# Patient Record
Sex: MALE | Race: WHITE | Employment: FULL TIME | URBAN - METROPOLITAN AREA
[De-identification: names, ages, dates, MRNs, and addresses within clinical notes are randomized per-mention and may not be internally consistent; named-entity substitution may affect disease eponyms.]

---

## 2017-06-16 ENCOUNTER — OFFICE VISIT (OUTPATIENT)
Dept: ORTHOPEDIC SURGERY | Age: 37
End: 2017-06-16

## 2017-06-16 VITALS
HEART RATE: 72 BPM | BODY MASS INDEX: 30.31 KG/M2 | DIASTOLIC BLOOD PRESSURE: 87 MMHG | WEIGHT: 199.96 LBS | HEIGHT: 68 IN | SYSTOLIC BLOOD PRESSURE: 124 MMHG

## 2017-06-16 DIAGNOSIS — S83.281A TEAR OF LATERAL CARTILAGE OR MENISCUS OF KNEE, CURRENT, RIGHT, INITIAL ENCOUNTER: ICD-10-CM

## 2017-06-16 DIAGNOSIS — S83.421A SPRAIN OF LATERAL COLLATERAL LIGAMENT OF RIGHT KNEE, INITIAL ENCOUNTER: ICD-10-CM

## 2017-06-16 DIAGNOSIS — M25.561 RIGHT KNEE PAIN, UNSPECIFIED CHRONICITY: Primary | ICD-10-CM

## 2017-06-16 PROCEDURE — L1812 KO ELASTIC W/JOINTS PRE OTS: HCPCS | Performed by: ORTHOPAEDIC SURGERY

## 2017-06-16 PROCEDURE — 73562 X-RAY EXAM OF KNEE 3: CPT | Performed by: ORTHOPAEDIC SURGERY

## 2017-06-16 PROCEDURE — 99214 OFFICE O/P EST MOD 30 MIN: CPT | Performed by: ORTHOPAEDIC SURGERY

## 2017-06-29 ENCOUNTER — TELEPHONE (OUTPATIENT)
Dept: ORTHOPEDIC SURGERY | Age: 37
End: 2017-06-29

## 2017-07-10 ENCOUNTER — OFFICE VISIT (OUTPATIENT)
Dept: ORTHOPEDIC SURGERY | Age: 37
End: 2017-07-10

## 2017-07-10 VITALS — HEIGHT: 68 IN | WEIGHT: 199.96 LBS | BODY MASS INDEX: 30.31 KG/M2

## 2017-07-10 DIAGNOSIS — S83.281D TEAR OF LATERAL CARTILAGE OR MENISCUS OF KNEE, CURRENT, RIGHT, SUBSEQUENT ENCOUNTER: Primary | ICD-10-CM

## 2017-07-10 DIAGNOSIS — S89.91XA RIGHT KNEE INJURY, INITIAL ENCOUNTER: ICD-10-CM

## 2017-07-10 PROCEDURE — 99213 OFFICE O/P EST LOW 20 MIN: CPT | Performed by: ORTHOPAEDIC SURGERY

## 2017-08-30 ENCOUNTER — OFFICE VISIT (OUTPATIENT)
Dept: ORTHOPEDIC SURGERY | Age: 37
End: 2017-08-30

## 2017-08-30 VITALS — WEIGHT: 199.96 LBS | BODY MASS INDEX: 30.31 KG/M2 | HEIGHT: 68 IN

## 2017-08-30 DIAGNOSIS — S83.281D TEAR OF LATERAL CARTILAGE OR MENISCUS OF KNEE, CURRENT, RIGHT, SUBSEQUENT ENCOUNTER: Primary | ICD-10-CM

## 2017-08-30 PROBLEM — M94.269 CHONDROMALACIA, KNEE: Status: ACTIVE | Noted: 2017-08-30

## 2017-08-30 PROCEDURE — 99213 OFFICE O/P EST LOW 20 MIN: CPT | Performed by: PHYSICIAN ASSISTANT

## 2017-09-11 DIAGNOSIS — S83.281A ACUTE LATERAL MENISCUS TEAR OF RIGHT KNEE, INITIAL ENCOUNTER: Primary | ICD-10-CM

## 2017-09-19 ENCOUNTER — HOSPITAL ENCOUNTER (OUTPATIENT)
Dept: SURGERY | Age: 37
Discharge: HOME | End: 2017-09-19
Attending: ORTHOPAEDIC SURGERY | Admitting: ORTHOPAEDIC SURGERY

## 2017-09-19 VITALS
SYSTOLIC BLOOD PRESSURE: 138 MMHG | HEART RATE: 71 BPM | TEMPERATURE: 97.7 F | OXYGEN SATURATION: 96 % | DIASTOLIC BLOOD PRESSURE: 50 MMHG | RESPIRATION RATE: 16 BRPM

## 2017-09-19 RX ORDER — IBUPROFEN 600 MG/1
600 TABLET ORAL EVERY 6 HOURS PRN
Qty: 40 TABLET | Refills: 1 | Status: SHIPPED | OUTPATIENT
Start: 2017-09-19 | End: 2019-08-01

## 2017-09-19 RX ORDER — SODIUM CHLORIDE, SODIUM LACTATE, POTASSIUM CHLORIDE, CALCIUM CHLORIDE 600; 310; 30; 20 MG/100ML; MG/100ML; MG/100ML; MG/100ML
INJECTION, SOLUTION INTRAVENOUS CONTINUOUS
Status: DISCONTINUED | OUTPATIENT
Start: 2017-09-19 | End: 2017-09-20 | Stop reason: HOSPADM

## 2017-09-19 RX ORDER — LABETALOL HYDROCHLORIDE 5 MG/ML
5 INJECTION, SOLUTION INTRAVENOUS EVERY 10 MIN PRN
Status: DISCONTINUED | OUTPATIENT
Start: 2017-09-19 | End: 2017-09-20 | Stop reason: HOSPADM

## 2017-09-19 RX ORDER — LIDOCAINE HYDROCHLORIDE 10 MG/ML
1 INJECTION, SOLUTION EPIDURAL; INFILTRATION; INTRACAUDAL; PERINEURAL
Status: ACTIVE | OUTPATIENT
Start: 2017-09-19 | End: 2017-09-19

## 2017-09-19 RX ORDER — PROMETHAZINE HYDROCHLORIDE 25 MG/ML
6.25 INJECTION, SOLUTION INTRAMUSCULAR; INTRAVENOUS
Status: DISCONTINUED | OUTPATIENT
Start: 2017-09-19 | End: 2017-09-20 | Stop reason: HOSPADM

## 2017-09-19 RX ORDER — ASPIRIN 325 MG
325 TABLET ORAL DAILY
Qty: 14 TABLET | Refills: 0 | Status: SHIPPED | OUTPATIENT
Start: 2017-09-19 | End: 2017-11-17 | Stop reason: ALTCHOICE

## 2017-09-19 RX ORDER — MEPERIDINE HYDROCHLORIDE 50 MG/ML
12.5 INJECTION INTRAMUSCULAR; INTRAVENOUS; SUBCUTANEOUS EVERY 5 MIN PRN
Status: DISCONTINUED | OUTPATIENT
Start: 2017-09-19 | End: 2017-09-20 | Stop reason: HOSPADM

## 2017-09-19 RX ORDER — MORPHINE SULFATE 2 MG/ML
2 INJECTION, SOLUTION INTRAMUSCULAR; INTRAVENOUS EVERY 5 MIN PRN
Status: DISCONTINUED | OUTPATIENT
Start: 2017-09-19 | End: 2017-09-20 | Stop reason: HOSPADM

## 2017-09-19 RX ORDER — HYDROCODONE BITARTRATE AND ACETAMINOPHEN 5; 325 MG/1; MG/1
1 TABLET ORAL EVERY 4 HOURS PRN
Qty: 40 TABLET | Refills: 0 | Status: SHIPPED | OUTPATIENT
Start: 2017-09-19 | End: 2017-10-06 | Stop reason: ALTCHOICE

## 2017-09-19 RX ORDER — OXYCODONE HYDROCHLORIDE AND ACETAMINOPHEN 5; 325 MG/1; MG/1
1 TABLET ORAL PRN
Status: ACTIVE | OUTPATIENT
Start: 2017-09-19 | End: 2017-09-19

## 2017-09-19 RX ORDER — MORPHINE SULFATE 2 MG/ML
1 INJECTION, SOLUTION INTRAMUSCULAR; INTRAVENOUS EVERY 5 MIN PRN
Status: DISCONTINUED | OUTPATIENT
Start: 2017-09-19 | End: 2017-09-20 | Stop reason: HOSPADM

## 2017-09-19 RX ORDER — HYDRALAZINE HYDROCHLORIDE 20 MG/ML
5 INJECTION INTRAMUSCULAR; INTRAVENOUS EVERY 10 MIN PRN
Status: DISCONTINUED | OUTPATIENT
Start: 2017-09-19 | End: 2017-09-20 | Stop reason: HOSPADM

## 2017-09-19 RX ORDER — DIPHENHYDRAMINE HYDROCHLORIDE 50 MG/ML
12.5 INJECTION INTRAMUSCULAR; INTRAVENOUS
Status: ACTIVE | OUTPATIENT
Start: 2017-09-19 | End: 2017-09-19

## 2017-09-19 RX ORDER — OXYCODONE HYDROCHLORIDE AND ACETAMINOPHEN 5; 325 MG/1; MG/1
2 TABLET ORAL PRN
Status: ACTIVE | OUTPATIENT
Start: 2017-09-19 | End: 2017-09-19

## 2017-09-19 RX ORDER — ONDANSETRON 2 MG/ML
4 INJECTION INTRAMUSCULAR; INTRAVENOUS PRN
Status: DISCONTINUED | OUTPATIENT
Start: 2017-09-19 | End: 2017-09-20 | Stop reason: HOSPADM

## 2017-09-19 RX ADMIN — SODIUM CHLORIDE, SODIUM LACTATE, POTASSIUM CHLORIDE, CALCIUM CHLORIDE: 600; 310; 30; 20 INJECTION, SOLUTION INTRAVENOUS at 11:53

## 2017-09-21 ENCOUNTER — HOSPITAL ENCOUNTER (OUTPATIENT)
Dept: PHYSICAL THERAPY | Age: 37
Discharge: HOME | End: 2017-09-30
Admitting: ORTHOPAEDIC SURGERY

## 2017-09-21 NOTE — FLOWSHEET NOTE
[facility]  Orthopaedics and Sports Rehabilitation, [facility]    Physical Therapy Daily Treatment Note  Date:  2017    Patient Name:  Tomas Shook    :  1980  MRN: [id number]  Restrictions/Precautions:    Medical/Treatment Diagnosis Information:  · Diagnosis: PT: R knee PLM, chondroplasty (17)  · Treatment Diagnosis: R knee pain K60.499E  Insurance/Certification information:  PT Insurance Information: [facility]  Physician Information:  Referring Practitioner: Mario Ramirez of care signed (Y/N):     Date of Patient follow up with Physician:     G-Code (if applicable):      Date G-Code Applied:    PT G-Codes  Functional Assessment Tool Used: LEFS  Score: 89% disability  Functional Limitation: Mobility: Walking and moving around  Mobility: Walking and Moving Around Current Status (): At least 80 percent but less than 100 percent impaired, limited or restricted  Mobility: Walking and Moving Around Goal Status ():  At least 1 percent but less than 20 percent impaired, limited or restricted    Progress Note: [x]  Yes  []  No  Next due by: Visit #10       Latex Allergy:  [x]NO      []YES  Preferred Language for Healthcare:   [x]English       []other:    Visit # Insurance Allowable   1 18 Expires 10/31/17     Pain level:  10     SUBJECTIVE:  See eval    OBJECTIVE: See eval  Observation:   Test measurements:      RESTRICTIONS/PRECAUTIONS: WBAT    Exercises/Interventions:     Therapeutic Ex Sets/sec Reps Notes HEP   Belt stretch  Seated HSS 30\"x3  30\"x3      Heel prop  Heel slides 1'  x10      QS  SLR 5\"x10  3x10      SSLR 3x10      Bike 5' ROM                                                                                   Manual Intervention                                                 NMR re-education                                                                          Therapeutic Exercise and NMR EXR  [] (04985) Provided verbal/tactile cueing for activities related to CP 10 minutes    Charges:  Timed Code Treatment Minutes: 30   Total Treatment Minutes: 50     [x] EVAL LOW 02542  [x] DW(50594) x  2   [] IONTO  [] NMR (59044) x      [] VASO  [] Manual (40550) x       [] Other:  [] TA x       [] Mech Traction (46487)  [] ES(attended) (49561)      [x] ES (un) (22173):     GOALS:   Patient stated goal: Return to work painfree    Therapist goals for Patient:   Short Term Goals: To be achieved in: 2 weeks  1. Independent in HEP and progression per patient tolerance, in order to prevent re-injury. 2. Patient will have a decrease in pain to facilitate improvement in movement, function, and ADLs as indicated by Functional Deficits. Long Term Goals: To be achieved in: 10 weeks  1. Disability index score of 10% or less for the LEFS to assist with reaching prior level of function. 2. Patient will demonstrate increased AROM to 0-130 to allow for proper joint functioning as indicated by patients Functional Deficits. 3. Patient will demonstrate an increase in Strength to good proximal hip strength and control, within 5lb HHD in LE to allow for proper functional mobility as indicated by patients Functional Deficits. 4. Patient will return to full functional activities without increased symptoms or restriction including ability to ascend/ descend stairs with reciprocal gait and ambulate I without gait abnormality. 5. Patient will be able to crawl > 100 feet and climb up/down ladders to return to work required duties without increased symptoms. Progression Towards Functional goals:  [] Patient is progressing as expected towards functional goals listed. [] Progression is slowed due to complexities listed. [] Progression has been slowed due to co-morbidities.   [x] Plan just implemented, too soon to assess goals progression  [] Other:     ASSESSMENT:  See eval    Treatment/Activity Tolerance:  [] Patient tolerated treatment well [] Patient limited by marvin  [] Patient limited by pain  [] Patient limited by other medical complications  [] Other:     Prognosis: [] Good [] Fair  [] Poor    Patient Requires Follow-up: [x] Yes  [] No    PLAN: See eval  [] Continue per plan of care [] Alter current plan (see comments)  [x] Plan of care initiated [] Hold pending MD visit [] Discharge    Electronically signed by: Tyler Flynn PT

## 2017-09-21 NOTE — PLAN OF CARE
[facility]  Orthopaedics and Sports Rehabilitation, [place] [address] [address] [facility], [address] [facility] [address]  Phone: [phone]   Fax:     [phone]       Physical Therapy Certification    Dear Referring Practitioner: Sunita Kelly,    We had the pleasure of evaluating the following patient for physical therapy services at [address] [place] [address]. A summary of our findings can be found in the initial assessment below. This includes our plan of care. If you have any questions or concerns regarding these findings, please do not hesitate to contact me at the office phone number checked above. Thank you for the referral.       Physician Signature:_______________________________Date:__________________  By signing above (or electronic signature), therapists plan is approved by physician      Patient: Zacarias Husain   : 1980   MRN: [id number]  Referring Physician: Referring Practitioner: Sunita Kelly      Evaluation Date: 2017      Medical Diagnosis Information:  Diagnosis: PT: R knee PLM, chondroplasty (17)   Treatment Diagnosis: R knee pain S83.281D                                         Insurance information: PT Insurance Information: [facility]     Precautions/ Contra-indications: none  Latex Allergy:  [x]NO      []YES  Preferred Language for Healthcare:   [x]English       []other:    SUBJECTIVE: Patient stated complaint:Patient injured R knee when he tripped down a step at work and fell to the ground 17. Patient reports immediate pain. PT: R knee PLM, chondroplasty (17)    Relevant Medical History:R knee PMM . L knee PMM   Functional Disability Index:PT G-Codes  Functional Assessment Tool Used: LEFS  Score: 89% disability  Functional Limitation: Mobility: Walking and moving around  Mobility: Walking and Moving Around Current Status ():  At least 80 percent but less than 100 percent impaired, limited or restricted  Mobility: Walking and Moving Around Goal Status (): At least 1 percent but less than 20 percent impaired, limited or restricted    Pain Scale: 4/10  Easing factors: rest, ice, NSAIDs  Provocative factors: prolonged sitting, prolonged standing, walking, stairs     Type: [x]Constant   []Intermittent  []Radiating [x]Localized []other:     Numbness/Tingling: none    Occupation/School: . Work included attic work, ladders, climbing poles, crawling. Off work at this time. Living Status/Prior Level of Function: Independent with ADLs and IADLs, no stairs at home. OBJECTIVE:     ROM LEFT RIGHT   HIP Flex     HIP Abd     HIP Ext     HIP IR     HIP ER     Knee ext     Knee Flex 132 68   Ankle PF 0 Lacking 10   Ankle DF     Ankle In     Ankle Ev     Strength  LEFT RIGHT   HIP Flexors 5 NT   HIP Abductors 5    HIP Ext     Hip ER     Knee EXT (quad) 5    Knee Flex (HS) 5    Ankle DF     Ankle PF     Ankle Inv     Ankle EV          Circumference  Mid apex  7 cm prox             Reflexes/Sensation:    [x]Dermatomes/Myotomes intact    [x]Reflexes equal and normal bilaterally   []Other:    Joint mobility:    [x]Normal    []Hypo   []Hyper    Palpation: tender patellar tendon, lateral joint line    Functional Mobility/Transfers: pain with sit to stand, I with bed mobility    Posture: na    Bandages/Dressings/Incisions: dressing change performed by PT. Incision open without drainage. 3 by 3 cm of psoriatic arthritis per patient on R knee present prior to surgery. Bursa sac with mild swelling joint line    Gait: (include devices/WB status) ambulating with decreased TKE on RLE with single axillary crutch    Orthopedic Special Tests: na                       [x] Patient history, allergies, meds reviewed. Medical chart reviewed. See intake form. Review Of Systems (ROS):  [x]Performed Review of systems (Integumentary, CardioPulmonary, Neurological) by intake and observation.  Intake form has been scanned into medical record. Patient has been instructed to contact their primary care physician regarding ROS issues if not already being addressed at this time. Co-morbidities/Complexities (which will affect course of rehabilitation):   [x]None           Arthritic conditions   []Rheumatoid arthritis (M05.9)  []Osteoarthritis (M19.91)   Cardiovascular conditions   []Hypertension (I10)  []Hyperlipidemia (E78.5)  []Angina pectoris (I20)  []Atherosclerosis (I70)   Musculoskeletal conditions   []Disc pathology   []Congenital spine pathologies   []Prior surgical intervention  []Osteoporosis (M81.8)  []Osteopenia (M85.8)   Endocrine conditions   []Hypothyroid (E03.9)  []Hyperthyroid Gastrointestinal conditions   []Constipation (O78.69)   Metabolic conditions   []Morbid obesity (E66.01)  []Diabetes type 1(E10.65) or 2 (E11.65)   []Neuropathy (G60.9)     Pulmonary conditions   []Asthma (J45)  []Coughing   []COPD (J44.9)   Psychological Disorders  []Anxiety (F41.9)  []Depression (F32.9)   []Other:   []Other:          Barriers to/and or personal factors that will affect rehab potential:              []Age  []Sex              []Motivation/Lack of Motivation                        [x]Co-Morbidities              []Cognitive Function, education/learning barriers              []Environmental, home barriers              [x]profession/work barriers  []past PT/medical experience  []other:  Justification: high level of activity required of work    Falls Risk Assessment (30 days):   [x] Falls Risk assessed and no intervention required. [] Falls Risk assessed and Patient requires intervention due to being higher risk   TUG score (>12s at risk):     [] Falls education provided, including       G-Codes:  PT G-Codes  Functional Assessment Tool Used: LEFS  Score: 89% disability  Functional Limitation: Mobility: Walking and moving around  Mobility: Walking and Moving Around Current Status ():  At least 80 percent but less than 100 []Signs/symptoms consistent with tendinitis/tendinosis    []signs/symptoms consistent with pathology which may benefit from Dry needling      []other:      Prognosis/Rehab Potential:      []Excellent   [x]Good    []Fair   []Poor    Tolerance of evaluation/treatment:    []Excellent   [x]Good    []Fair   []Poor    Physical Therapy Evaluation Complexity Justification  [x] A history of present problem with:  [] no personal factors and/or comorbidities that impact the plan of care;  [x]1-2 personal factors and/or comorbidities that impact the plan of care  []3 personal factors and/or comorbidities that impact the plan of care  [x] An examination of body systems using standardized tests and measures addressing any of the following: body structures and functions (impairments), activity limitations, and/or participation restrictions;:  [] a total of 1-2 or more elements   [] a total of 3 or more elements   [x] a total of 4 or more elements   [x] A clinical presentation with:  [x] stable and/or uncomplicated characteristics   [] evolving clinical presentation with changing characteristics  [] unstable and unpredictable characteristics;   [x] Clinical decision making of [x] low, [] moderate, [] high complexity using standardized patient assessment instrument and/or measurable assessment of functional outcome. [x] EVAL (LOW) 23603 (typically 20 minutes face-to-face)  [] EVAL (MOD) 37463 (typically 30 minutes face-to-face)  [] EVAL (HIGH) 24995 (typically 45 minutes face-to-face)  [] RE-EVAL     PLAN:   Frequency/Duration:  2 days per week for 10 Weeks:  Interventions:  [x]  Therapeutic exercise including: strength training, ROM, for Lower extremity and core   [x]  NMR activation and proprioception for LE, Glutes and Core   [x]  Manual therapy as indicated for LE, Hip and spine to include: Dry Needling/IASTM, STM, PROM, Gr I-IV mobilizations, manipulation.    [x] Modalities as needed that may include: thermal agents, E-stim, Biofeedback, US, iontophoresis as indicated  [x] Patient education on joint protection, postural re-education, activity modification, progression of HEP. HEP instruction: (see scanned forms)    GOALS:  Patient stated goal: Return to work painfree    Therapist goals for Patient:   Short Term Goals: To be achieved in: 2 weeks  1. Independent in HEP and progression per patient tolerance, in order to prevent re-injury. 2. Patient will have a decrease in pain to facilitate improvement in movement, function, and ADLs as indicated by Functional Deficits. Long Term Goals: To be achieved in: 10 weeks  1. Disability index score of 10% or less for the LEFS to assist with reaching prior level of function. 2. Patient will demonstrate increased AROM to 0-130 to allow for proper joint functioning as indicated by patients Functional Deficits. 3. Patient will demonstrate an increase in Strength to good proximal hip strength and control, within 5lb HHD in LE to allow for proper functional mobility as indicated by patients Functional Deficits. 4. Patient will return to full functional activities without increased symptoms or restriction including ability to ascend/ descend stairs with reciprocal gait and ambulate I without gait abnormality. 5. Patient will be able to crawl > 100 feet and climb up/down ladders to return to work required duties without increased symptoms.      Electronically signed by:  Sis Andrews PT

## 2017-09-22 ENCOUNTER — OFFICE VISIT (OUTPATIENT)
Dept: ORTHOPEDIC SURGERY | Age: 37
End: 2017-09-22

## 2017-09-22 VITALS — HEIGHT: 68 IN | WEIGHT: 199.08 LBS | BODY MASS INDEX: 30.17 KG/M2

## 2017-09-22 DIAGNOSIS — M94.261 CHONDROMALACIA, KNEE, RIGHT: ICD-10-CM

## 2017-09-22 DIAGNOSIS — Z98.890 S/P ARTHROSCOPY OF KNEE: Primary | ICD-10-CM

## 2017-09-22 PROCEDURE — 99024 POSTOP FOLLOW-UP VISIT: CPT | Performed by: PHYSICIAN ASSISTANT

## 2017-09-25 ENCOUNTER — HOSPITAL ENCOUNTER (OUTPATIENT)
Dept: PHYSICAL THERAPY | Age: 37
Discharge: HOME | End: 2017-09-25
Admitting: ORTHOPAEDIC SURGERY

## 2017-09-27 ENCOUNTER — HOSPITAL ENCOUNTER (OUTPATIENT)
Dept: PHYSICAL THERAPY | Age: 37
Discharge: HOME | End: 2017-09-27
Admitting: ORTHOPAEDIC SURGERY

## 2017-10-01 ENCOUNTER — HOSPITAL ENCOUNTER (OUTPATIENT)
Dept: PHYSICAL THERAPY | Age: 37
Discharge: HOME | End: 2017-10-31
Attending: ORTHOPAEDIC SURGERY | Admitting: ORTHOPAEDIC SURGERY

## 2017-10-03 ENCOUNTER — HOSPITAL ENCOUNTER (OUTPATIENT)
Dept: PHYSICAL THERAPY | Age: 37
Discharge: HOME | End: 2017-10-03
Admitting: ORTHOPAEDIC SURGERY

## 2017-10-03 NOTE — FLOWSHEET NOTE
Physical Therapist Assistant Activity Sheet    Date:  10/3/2017    Patient Name:  Andie Steen    :  1980  MRN: [id number]  Restrictions/Precautions:    Medical/Treatment Diagnosis Information:  ·  R knee PLM      Physician Information:   Cesar Martinez Post-op  8 wks  12 wks 16 wks 20 wks   24 wks                            Activities                                                DOS/DOI:                                                    Date: 17    Bike    Elliptical    Treadmill    Airdyne        Gastroc stretch    Soleus stretch    Hamstring stretch    ITB stretch    Hip Flexor stretch    Quad stretch    Adductor stretch        Weight Shifting sp                              fp                              tp    Lateral walking (with/w/o TB)        Balance: PEP/Danielle board                   SLS  2# tramp toss foam x 30         Star excursion load/explode          Extremity reach UE/LE        Leg Press Patel. 100# x 30                     Ecc.                      Inv. 60# x30   Calf Press Patel. Ecc.                      Inv.        NINI   Flex               ABd R/L 50# x 30 ea              ADd               TKE 65# x 30 c 3\" hold              Ext R/L 50# x 30 ea       Steps  Up                Up and Over                Down                Lateral 4\"x15               Rotation        Squats:  Mini                   Wall 4# x30                  BOSU        Lunges:  Lunge to Balance                   Balance to Lunge                   Walking        Knee Extension Bilat. Ecc.                               Inv. Hamstring Curls Bilat. 50# x 30                               Ecc.                                Inv.        Soleus Press Bilat. Ecc.                            Inv.         Ladders    Square    Jump/Hop  Low                       Med.                       High    SLR + x3       Modality    PTA Assessment

## 2017-10-05 ENCOUNTER — HOSPITAL ENCOUNTER (OUTPATIENT)
Dept: PHYSICAL THERAPY | Age: 37
Discharge: HOME | End: 2017-10-05
Admitting: ORTHOPAEDIC SURGERY

## 2017-10-05 NOTE — FLOWSHEET NOTE
proximal hip, and core control in self care, mobility, lifting, ambulation and eccentric single leg control. NMR and Therapeutic Activities:    [] (85506 or 50074) Provided verbal/tactile cueing for activities related to improving balance, coordination, kinesthetic sense, posture, motor skill, proprioception and motor activation to allow for proper function of core, proximal hip and LE with self care and ADLs  [] (89501) Gait Re-education- Provided training and instruction to the patient for proper LE, core and proximal hip recruitment and positioning and eccentric body weight control with ambulation re-education including up and down stairs     Home Exercise Program:    [x] (54634) Reviewed/Progressed HEP activities related to strengthening, flexibility, endurance, ROM of core, proximal hip and LE for functional self-care, mobility, lifting and ambulation/stair navigation   [] (44316)Reviewed/Progressed HEP activities related to improving balance, coordination, kinesthetic sense, posture, motor skill, proprioception of core, proximal hip and LE for self care, mobility, lifting, and ambulation/stair navigation      Manual Treatments:  PROM / STM / Oscillations-Mobs:  G-I, II, III, IV (PA's, Inf., Post.)  [] (53020) Provided manual therapy to mobilize LE, proximal hip and/or LS spine soft tissue/joints for the purpose of modulating pain, promoting relaxation,  increasing ROM, reducing/eliminating soft tissue swelling/inflammation/restriction, improving soft tissue extensibility and allowing for proper ROM for normal function with self care, mobility, lifting and ambulation.      Modalities:   declined  Charges:  Timed Code Treatment Minutes: 40   Total Treatment Minutes: 40     [] EVAL LOW 48241  [x] HL(71220) x  2   [] IONTO  [x] NMR (39299) x      [] VASO  [] Manual (41292) x       [] Other:  [] TA x       [] Mech Traction (24140)  [] ES(attended) (81254)      [] ES (un) (03079):     GOALS:   Patient stated of care initiated [] Hold pending MD visit [] Discharge    Electronically signed by: Aurea Gallo PT

## 2017-10-05 NOTE — FLOWSHEET NOTE
Physical Therapist Assistant Activity Sheet    Date:  10/5/2017    Patient Name:  Brooke Fay    :  1980  MRN: [id number]  Restrictions/Precautions:    Medical/Treatment Diagnosis Information:  ·   Diagnosis: PT: R knee PLM, chondroplasty (17)     Treatment Diagnosis: R knee pain S83.281D  Physician Information:   Maria C Roblero                            Activities                                                DOS/DOI:                                                    Date: 9/27/17  10/5/17   Bike  6 min   Elliptical     Treadmill     Airdyne          Gastroc stretch / Heel Raises  30\" x 3 / 3 x 10   Soleus stretch     Hamstring stretch     ITB stretch     Hip Flexor stretch     Quad stretch     Adductor stretch          Weight Shifting sp                               fp                               tp     Lateral walking (with/w/o TB)          Balance: PEP/Danielle board                    SLS  2# tramp toss foam x 30 4# tramp toss foam x 30         Star excursion load/explode  X 7         Extremity reach UE/LE          Leg Press Patel. 100# x 30 100# x 30                     Ecc.                       Inv. 60# x30 60# x 30   Calf Press Patel. Ecc.                       Inv.          NINI   Flex                ABd R/L 50# x 30 ea 50# x 30 R/L              ADd                TKE 65# x 30 c 3\" hold               Ext R/L 50# x 30 ea 50# x 30 R/L        Steps  Up                Up and Over                 Down                 Lateral 4\"x15 Attempted, pain               Rotation          Squats:  Mini                    Wall 4# x30 X 30                  BOSU          Lunges:  Lunge to Balance                    Balance to Lunge                    Walking          Knee Extension Bilat. Ecc.                                Inv. Hamstring Curls Bilat.  50# x 30 50# x 30                               Ecc.                                 Inv.          Soleus Press Bilat. Ecc.                             Inv. Ladders     Square     Jump/Hop  Low                        Med.                        High     SLR + x3         Modality  Premod + CP 10'   PTA Assessment Discomfort with LSDs, stopped at 15 reps and attempted wall squats without pain. Pain with LSD. Challenged with star excursion.  Pt reports swelling in knee is limiting function   Time Based Treatment 34 minutes 30 minutes     Electronically signed by: Peng Greene PTA

## 2017-10-06 ENCOUNTER — OFFICE VISIT (OUTPATIENT)
Dept: ORTHOPEDIC SURGERY | Age: 37
End: 2017-10-06

## 2017-10-06 VITALS
WEIGHT: 199.08 LBS | HEIGHT: 68 IN | BODY MASS INDEX: 30.17 KG/M2 | HEART RATE: 85 BPM | DIASTOLIC BLOOD PRESSURE: 90 MMHG | SYSTOLIC BLOOD PRESSURE: 123 MMHG

## 2017-10-06 DIAGNOSIS — M94.261 CHONDROMALACIA, KNEE, RIGHT: ICD-10-CM

## 2017-10-06 DIAGNOSIS — Z98.890 S/P ARTHROSCOPY OF KNEE: Primary | ICD-10-CM

## 2017-10-06 DIAGNOSIS — S83.281A ACUTE LATERAL MENISCUS TEAR OF RIGHT KNEE, INITIAL ENCOUNTER: ICD-10-CM

## 2017-10-06 PROCEDURE — 99024 POSTOP FOLLOW-UP VISIT: CPT | Performed by: ORTHOPAEDIC SURGERY

## 2017-10-06 NOTE — PROGRESS NOTES
This patient presents status post is 9/19/17 right knee arthroscopy for a 20% lateral meniscus tear and limited grade 3 chondromalacia of the of the patella and medial femoral condyle. POSTOPERATIVE DIAGNOSES:  1. Tear of posterior horn mid body and posterior horn lateral  meniscus involving 20% meniscus. 2.  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 3.  Synovitis. 4.  Mild grade 3 chondromalacia of the medial femoral condyle  approximately 1.5 x 1.5 cm region. History of present illness: The patient returns today for a followup after knee arthroscopy. Pain control has been satisfactory with oral medications. They have completed formal physical therapy. He reports he steadily improving I really having much pain anymore. He still notes he has continued swelling along the front of his knee. Physical examination: Incisions are well healed with no signs of infection. The patient demonstrates full active range of motion. Quad tone is adequate. Normal gait without the use of ambulatory devices. He still has a fairly tense but relatively small prepatellar bursa measuring about 3 cm in diameter. Signs of infection or DVT. There is no tenderness over the lateral joint space or medial joint space. The calf is soft and nontender as well. Assessment/plan: The patient is doing reasonably well after knee arthroscopy. They have no complaints. I recommend continued therapy. He is going to be released to work light duty with no climbing ladders, no heavy lifting and no crawling. We will reevaluate him in 3 weeks. 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 We are also getting him a simple compression sleeve to help try to get rid of his prepatellar bursa. I have personally performed and/or participated in the history, exam and medical decision making and agree with all pertinent clinical information.  I have also reviewed and agree with the past medical, family and social history unless otherwise noted. This dictation was performed with a verbal recognition program (DRAGON) and it was checked for errors. It is possible that there are still dictated errors within this office note. If so, please bring any errors to my attention for an addendum. All efforts were made to ensure that this office note is accurate.           Ham Patten MD

## 2017-10-09 ENCOUNTER — HOSPITAL ENCOUNTER (OUTPATIENT)
Dept: PHYSICAL THERAPY | Age: 37
Discharge: HOME | End: 2017-10-09
Admitting: ORTHOPAEDIC SURGERY

## 2017-10-09 NOTE — FLOWSHEET NOTE
Formerly [facility]  Orthopaedics and Sports Rehabilitation, [facility]    Physical Therapy Daily Treatment Note  Date:  10/9/2017    Patient Name:  Tomas Shook    :  1980  MRN: [id number]  Restrictions/Precautions:    Medical/Treatment Diagnosis Information:  · Diagnosis: PT: R knee PLM, chondroplasty (17)  · Treatment Diagnosis: R knee pain N56.342S  Insurance/Certification information:  PT Insurance Information: [facility]  Physician Information:  Referring Practitioner: Mario Ramirez of care signed (Y/N):     Date of Patient follow up with Physician:     G-Code (if applicable):      Date G-Code Applied:         Progress Note: [x]  Yes  []  No  Next due by: Visit #10       Latex Allergy:  [x]NO      []YES  Preferred Language for Healthcare:   [x]English       []other:    Visit # Insurance Allowable   7 18 Expires 10/31/17     Pain level:  4/10     SUBJECTIVE: Saw MD who was pleased with progress, gave him a compression sleeve to help with the swelling. \"He is going to be released to work light duty with no climbing ladders, no heavy lifting and no crawling for 3 weeks. Hoping to return to full duty then. \"    OBJECTIVE:   Observation:   Test measurements:   L knee ROM: 0-132 degrees    RESTRICTIONS/PRECAUTIONS: WBAT    Exercises/Interventions:     Therapeutic Ex Sets/sec Notes HEP   Belt stretch  Seated HSS   30\"x3     Heel prop  Heel slides 1'  x10     QS  SLR 5\"x10  3x10     SSLR  Prone SLR 3x10  3x10     Bike 6'      Slantboard  HRs 30\"x3  3x10     Leg Press- DL  NINI- ABD  NINI- TKE     HS curls- DL                 See other flow sheet                                                                                                                                              Therapeutic Exercise and NMR EXR  [x] (66516) Provided verbal/tactile cueing for activities related to strengthening, flexibility, endurance, ROM for improvements in LE, proximal hip, and core control with self care, mobility, lifting, ambulation.  [] (60472) Provided verbal/tactile cueing for activities related to improving balance, coordination, kinesthetic sense, posture, motor skill, proprioception  to assist with LE, proximal hip, and core control in self care, mobility, lifting, ambulation and eccentric single leg control. NMR and Therapeutic Activities:    [] (12347 or 01208) Provided verbal/tactile cueing for activities related to improving balance, coordination, kinesthetic sense, posture, motor skill, proprioception and motor activation to allow for proper function of core, proximal hip and LE with self care and ADLs  [] (37310) Gait Re-education- Provided training and instruction to the patient for proper LE, core and proximal hip recruitment and positioning and eccentric body weight control with ambulation re-education including up and down stairs     Home Exercise Program:    [x] (73242) Reviewed/Progressed HEP activities related to strengthening, flexibility, endurance, ROM of core, proximal hip and LE for functional self-care, mobility, lifting and ambulation/stair navigation   [] (31091)Reviewed/Progressed HEP activities related to improving balance, coordination, kinesthetic sense, posture, motor skill, proprioception of core, proximal hip and LE for self care, mobility, lifting, and ambulation/stair navigation      Manual Treatments:  PROM / STM / Oscillations-Mobs:  G-I, II, III, IV (PA's, Inf., Post.)  [] (98067) Provided manual therapy to mobilize LE, proximal hip and/or LS spine soft tissue/joints for the purpose of modulating pain, promoting relaxation,  increasing ROM, reducing/eliminating soft tissue swelling/inflammation/restriction, improving soft tissue extensibility and allowing for proper ROM for normal function with self care, mobility, lifting and ambulation.      Modalities:   declined  Charges:  Timed Code Treatment Minutes: 40   Total Treatment Minutes: 40     [] EVAL LOW 05041  [x] AD(87352) x  2

## 2017-10-09 NOTE — FLOWSHEET NOTE
Ecc.                                 Inv. Hamstring Curls Bilat. 50# x 30 50# x 30 50# x 30                               Ecc.                                  Inv.            Soleus Press Bilat. Ecc.                              Inv. Ladders      Square      Jump/Hop  Low                         Med.                         High      SLR + x3           Modality  Premod + CP 10'    PTA Assessment Discomfort with LSDs, stopped at 15 reps and attempted wall squats without pain. Pain with LSD. Challenged with star excursion. Pt reports swelling in knee is limiting function Good exercise pace and technique.     34 minutes 30 minutes 30 minutes     Electronically signed by: Sarah Lawson PTA

## 2017-10-12 ENCOUNTER — HOSPITAL ENCOUNTER (OUTPATIENT)
Dept: PHYSICAL THERAPY | Age: 37
Discharge: HOME | End: 2017-10-12
Admitting: ORTHOPAEDIC SURGERY

## 2017-10-12 NOTE — FLOWSHEET NOTE
[facility]  Orthopaedics and Sports Rehabilitation, [facility]    Physical Therapy Daily Treatment Note  Date:  10/12/2017    Patient Name:  Yahir Pickens    :  1980  MRN: [id number]  Restrictions/Precautions:    Medical/Treatment Diagnosis Information:  · Diagnosis: PT: R knee PLM, chondroplasty (17)  · Treatment Diagnosis: R knee pain Y13.794A  Insurance/Certification information:  PT Insurance Information: [facility]  Physician Information:  Referring Practitioner: Delmar Caal of care signed (Y/N):     Date of Patient follow up with Physician:     G-Code (if applicable):      Date G-Code Applied:         Progress Note: [x]  Yes  []  No  Next due by: Visit #10       Latex Allergy:  [x]NO      []YES  Preferred Language for Healthcare:   [x]English       []other:    Visit # Insurance Allowable   8 18 Expires 10/31/17     Pain level:  4/10      SUBJECTIVE: Frustrated that he can not return to work per his company with any restrictions. Plans to call MD today to hope to be released full duty. \"He is going to be released to work light duty with no climbing ladders, no heavy lifting and no crawling for 3 weeks. Hoping to return to full duty then. \"    OBJECTIVE:    Observation:   Test measurements:   L knee ROM: 0-132 degrees    RESTRICTIONS/PRECAUTIONS: WBAT    Exercises/Interventions:     Therapeutic Ex Sets/sec Notes HEP   Belt stretch  Seated HSS   30\"x3     Heel prop  Heel slides 1'  x10             Bike 6'      Slantboard  HRs 30\"x3  3x10     Leg Press- DL  Leg Press- Ecc  Leg Press- SL  NINI- ABD  NINI- TKE 90# x30  70# x30  65# x30  80# x30     HS curls- DL 50# x30     SLS tramp toss 4# x30 foam     Wall squats  Star excursion  Monster Walks 4# MB 30\"x3  x10  x3 laps Red WP     SLR + x5                                                                                                                                                   Therapeutic Exercise and NMR EXR  [x] (82680) Provided verbal/tactile cueing for activities related to strengthening, flexibility, endurance, ROM for improvements in LE, proximal hip, and core control with self care, mobility, lifting, ambulation.  [] (97958) Provided verbal/tactile cueing for activities related to improving balance, coordination, kinesthetic sense, posture, motor skill, proprioception  to assist with LE, proximal hip, and core control in self care, mobility, lifting, ambulation and eccentric single leg control.      NMR and Therapeutic Activities:    [] (84827 or 31417) Provided verbal/tactile cueing for activities related to improving balance, coordination, kinesthetic sense, posture, motor skill, proprioception and motor activation to allow for proper function of core, proximal hip and LE with self care and ADLs  [] (64376) Gait Re-education- Provided training and instruction to the patient for proper LE, core and proximal hip recruitment and positioning and eccentric body weight control with ambulation re-education including up and down stairs     Home Exercise Program:    [x] (85750) Reviewed/Progressed HEP activities related to strengthening, flexibility, endurance, ROM of core, proximal hip and LE for functional self-care, mobility, lifting and ambulation/stair navigation   [] (81638)Reviewed/Progressed HEP activities related to improving balance, coordination, kinesthetic sense, posture, motor skill, proprioception of core, proximal hip and LE for self care, mobility, lifting, and ambulation/stair navigation      Manual Treatments:  PROM / STM / Oscillations-Mobs:  G-I, II, III, IV (PA's, Inf., Post.)  [] (43902) Provided manual therapy to mobilize LE, proximal hip and/or LS spine soft tissue/joints for the purpose of modulating pain, promoting relaxation,  increasing ROM, reducing/eliminating soft tissue swelling/inflammation/restriction, improving soft tissue extensibility and allowing for proper ROM for normal function with self care, mobility, lifting and ambulation. Modalities:   HV/ CP 10 minutes   Charges:  Timed Code Treatment Minutes: 40   Total Treatment Minutes: 50     [] EVAL LOW 85978  [x] NT(27412) x  2   [] IONTO  [x] NMR (57836) x      [] VASO  [] Manual (97562) x       [] Other:  [] TA x       [] Mech Traction (95971)  [] ES(attended) (62510)      [x] ES (un) (14632):     GOALS:   Patient stated goal: Return to work painfree    Therapist goals for Patient:   Short Term Goals: To be achieved in: 2 weeks  1. Independent in HEP and progression per patient tolerance, in order to prevent re-injury. 2. Patient will have a decrease in pain to facilitate improvement in movement, function, and ADLs as indicated by Functional Deficits. Long Term Goals: To be achieved in: 10 weeks  1. Disability index score of 10% or less for the LEFS to assist with reaching prior level of function. 2. Patient will demonstrate increased AROM to 0-130 to allow for proper joint functioning as indicated by patients Functional Deficits. 3. Patient will demonstrate an increase in Strength to good proximal hip strength and control, within 5lb HHD in LE to allow for proper functional mobility as indicated by patients Functional Deficits. 4. Patient will return to full functional activities without increased symptoms or restriction including ability to ascend/ descend stairs with reciprocal gait and ambulate I without gait abnormality. 5. Patient will be able to crawl > 100 feet and climb up/down ladders to return to work required duties without increased symptoms. Progression Towards Functional goals:  [] Patient is progressing as expected towards functional goals listed. [] Progression is slowed due to complexities listed. [] Progression has been slowed due to co-morbidities. [x] Plan just implemented, too soon to assess goals progression  [] Other:     ASSESSMENT:  Tolerated treatment well. Progressing without complaints.  Hoping to return

## 2017-10-16 ENCOUNTER — OFFICE VISIT (OUTPATIENT)
Dept: ORTHOPEDIC SURGERY | Age: 37
End: 2017-10-16

## 2017-10-16 VITALS
HEART RATE: 80 BPM | WEIGHT: 199.08 LBS | HEIGHT: 67 IN | DIASTOLIC BLOOD PRESSURE: 90 MMHG | BODY MASS INDEX: 31.25 KG/M2 | SYSTOLIC BLOOD PRESSURE: 141 MMHG

## 2017-10-16 DIAGNOSIS — Z98.890 S/P ARTHROSCOPY OF KNEE: Primary | ICD-10-CM

## 2017-10-16 DIAGNOSIS — M25.561 RIGHT KNEE PAIN, UNSPECIFIED CHRONICITY: ICD-10-CM

## 2017-10-16 PROCEDURE — 99024 POSTOP FOLLOW-UP VISIT: CPT | Performed by: PHYSICIAN ASSISTANT

## 2017-10-16 NOTE — PROGRESS NOTES
This patient presents status post is 9/19/17 right knee arthroscopy for a 20% lateral meniscus tear and limited grade 3 chondromalacia of the of the patella and medial femoral condyle. POSTOPERATIVE DIAGNOSES:  1.  Tear of posterior horn mid body and posterior horn lateral  meniscus involving 20% meniscus. 2.  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 3.  Synovitis. 4.  Mild grade 3 chondromalacia of the medial femoral condyle  approximately 1.5 x 1.5 cm region. History of present illness: The patient returns today for a followup after knee arthroscopy. Pain control has been satisfactory with oral medications. He has been working in therapy. Overall he is much improved. The swelling has decreased. He denies numbness or tingling. Unfortunately, his employer would not let him return with light duty restrictions. Pain Assessment  Location of Pain: Knee  Location Modifiers: Right  Severity of Pain: 0]    Physical examination: Incisions are well healed with no signs of infection. The patient demonstrates full active range of motion. Quad tone is adequate. Normal gait without the use of ambulatory devices. Assessment/plan: The patient is doing well after knee arthroscopy. He is going to finish up this week of formal physical therapy and return to work on Monday full duty. We will plan to see him back in approximately 1 month.

## 2017-10-18 ENCOUNTER — HOSPITAL ENCOUNTER (OUTPATIENT)
Dept: PHYSICAL THERAPY | Age: 37
Discharge: HOME | End: 2017-10-18
Admitting: ORTHOPAEDIC SURGERY

## 2017-10-18 NOTE — FLOWSHEET NOTE
[facility]  Orthopaedics and Sports Rehabilitation, [facility]    Physical Therapy Daily Treatment Note  Date:  10/18/2017    Patient Name:  Beau Tay    :  1980  MRN: [id number]  Restrictions/Precautions:    Medical/Treatment Diagnosis Information:  · Diagnosis: PT: R knee PLM, chondroplasty (17)  · Treatment Diagnosis: R knee pain L43.922Q  Insurance/Certification information:  PT Insurance Information: [facility]  Physician Information:  Referring Practitioner: Domitila Tate of care signed (Y/N):     Date of Patient follow up with Physician:     G-Code (if applicable):      Date G-Code Applied:         Progress Note: [x]  Yes  []  No  Next due by: Visit #10       Latex Allergy:  [x]NO      []YES  Preferred Language for Healthcare:   [x]English       []other:    Visit # Insurance Allowable    Expires 10/31/17     Pain level:  4/10      SUBJECTIVE: Called MD to return to work full duty Monday. Plan for next PT session to be his last.    \"He is going to be released to work light duty with no climbing ladders, no heavy lifting and no crawling for 3 weeks. Hoping to return to full duty then. \"    OBJECTIVE:    Observation:   Test measurements:   L knee ROM: 0-132 degrees    RESTRICTIONS/PRECAUTIONS: WBAT    Exercises/Interventions:     Therapeutic Ex Sets/sec Notes HEP   Belt stretch  Seated HSS   30\"x3     Heel prop  Heel slides 1'  x10             Bike 6'      Slantboard  HRs 30\"x3  3x10     Leg Press- DL  Leg Press- Ecc  Leg Press- SL  NINI- ABD  NINI- TKE 90# x30  70# x30  65# x30  80# x30     HS curls- DL 50# x30     SLS tramp toss 4# x30 foam     Wall squats  Star excursion  Monster Walks 4# MB 30\"x3  x10  x3 laps Red WP     SLR + x5                                                                                                                                                   Therapeutic Exercise and NMR EXR  [x] (73963) Provided verbal/tactile cueing for activities related to strengthening, flexibility, endurance, ROM for improvements in LE, proximal hip, and core control with self care, mobility, lifting, ambulation.  [] (75931) Provided verbal/tactile cueing for activities related to improving balance, coordination, kinesthetic sense, posture, motor skill, proprioception  to assist with LE, proximal hip, and core control in self care, mobility, lifting, ambulation and eccentric single leg control. NMR and Therapeutic Activities:    [] (95580 or 31847) Provided verbal/tactile cueing for activities related to improving balance, coordination, kinesthetic sense, posture, motor skill, proprioception and motor activation to allow for proper function of core, proximal hip and LE with self care and ADLs  [] (24393) Gait Re-education- Provided training and instruction to the patient for proper LE, core and proximal hip recruitment and positioning and eccentric body weight control with ambulation re-education including up and down stairs     Home Exercise Program:    [x] (72515) Reviewed/Progressed HEP activities related to strengthening, flexibility, endurance, ROM of core, proximal hip and LE for functional self-care, mobility, lifting and ambulation/stair navigation   [] (47553)Reviewed/Progressed HEP activities related to improving balance, coordination, kinesthetic sense, posture, motor skill, proprioception of core, proximal hip and LE for self care, mobility, lifting, and ambulation/stair navigation      Manual Treatments:  PROM / STM / Oscillations-Mobs:  G-I, II, III, IV (PA's, Inf., Post.)  [] (48367) Provided manual therapy to mobilize LE, proximal hip and/or LS spine soft tissue/joints for the purpose of modulating pain, promoting relaxation,  increasing ROM, reducing/eliminating soft tissue swelling/inflammation/restriction, improving soft tissue extensibility and allowing for proper ROM for normal function with self care, mobility, lifting and ambulation.      Modalities:   HV/ CP 10 minutes Patient limited by pain  [] Patient limited by other medical complications  [] Other:     Prognosis: [x] Good [] Fair  [] Poor    Patient Requires Follow-up: [x] Yes  [] No    PLAN:   [x] Continue per plan of care [] Alter current plan (see comments)  [] Plan of care initiated [] Hold pending MD visit [] Discharge    Electronically signed by: Haley Ramon PT

## 2017-10-18 NOTE — FLOWSHEET NOTE
Physical Therapist Assistant Activity Sheet    Date:  10/18/2017    Patient Name:  Dominick Fan    :  1980  MRN: [id number]  Restrictions/Precautions:    Medical/Treatment Diagnosis Information:  ·   Diagnosis: PT: R knee PLM, chondroplasty (17)     Treatment Diagnosis: R knee pain S83.281D  Physician Information:   Milvia May                            Activities                                                DOS/DOI:                                                    Date: 9/27/17  10/5/17 10/9/17 10/18/17   Bike  6 min 6 min    Elliptical       Treadmill       Airdyne              Gastroc stretch / Heel Raises  30\" x 3 / 3 x 10 30\" x 3/ x 30 30\" x 3 / 3 x 10   Soleus stretch       Hamstring stretch       ITB stretch       Hip Flexor stretch       Quad stretch       Adductor stretch              Weight Shifting sp                                 fp                                 tp       Lateral walking (with/w/o TB)              Balance: PEP/Danielle board                      SLS  2# tramp toss foam x 30 4# tramp toss foam x 30 4# ball AM toss 4 ways x 20 ea 4# ball 4 ways x 30 ea         Star excursion load/explode  X 7 -------          Extremity reach UE/LE              Leg Press Patel. 100# x 30 100# x 30 110# x 30 110# x 30                     Ecc.                         Inv. 60# x30 60# x 30 70# x 30 70# x 30   Calf Press Patel.                          Ecc.                         Inv.              NINI   Flex   R/L 65# x 30 ea 65# 3 x 10 R/L              ABd R/L 50# x 30 ea 50# x 30 R/L  R/L 65# x 30 ea 65# 3 x 10 R/L              ADd                  TKE 65# x 30 c 3\" hold  80# x 30 c 3\" hold 80$ x 30 c 3\" hold              Ext R/L 50# x 30 ea 50# x 30 R/L R/L 65#x 30 ea 65# 3 x 10 R/L          Steps  Up                  Up and Over                   Down                   Lateral 4\"x15 Attempted, pain                 Rotation              Squats:  Mini                      Wall 4# x30 X 30 -------

## 2017-11-01 ENCOUNTER — HOSPITAL ENCOUNTER (OUTPATIENT)
Dept: PHYSICAL THERAPY | Age: 37
Discharge: HOME | End: 2017-11-30
Attending: ORTHOPAEDIC SURGERY | Admitting: ORTHOPAEDIC SURGERY

## 2017-11-17 ENCOUNTER — OFFICE VISIT (OUTPATIENT)
Dept: ORTHOPEDIC SURGERY | Age: 37
End: 2017-11-17

## 2017-11-17 VITALS — WEIGHT: 199.08 LBS | HEIGHT: 67 IN | BODY MASS INDEX: 31.25 KG/M2

## 2017-11-17 DIAGNOSIS — M94.261 CHONDROMALACIA, KNEE, RIGHT: ICD-10-CM

## 2017-11-17 DIAGNOSIS — Z98.890 S/P ARTHROSCOPY OF KNEE: Primary | ICD-10-CM

## 2017-11-17 PROCEDURE — 99024 POSTOP FOLLOW-UP VISIT: CPT | Performed by: PHYSICIAN ASSISTANT

## 2017-11-17 NOTE — PROGRESS NOTES
History of present illness: The patient returns today for a followup after right knee arthroscopy performed on 9/19/2017. He has been progressing well and has virtually no complaints today he has returned to work and reports only a little stiffness at the end of the day. Physical examination: Incisions are well healed with no signs of infection. The patient demonstrates full active range of motion. Quad tone is adequate. Normal gait without the use of ambulatory devices. Assessment/plan: The patient is doing well after knee arthroscopy. They have no complaints. I recommend continued home therapy exercises and a return to normal activity. He may return to work full duty and will follow-up again in 6 months for reevaluation. Beckie Jaimes, [facility]    This dictation was performed with a verbal recognition program [facility] ) and it was checked for errors. It is possible that there are still dictated errors within this office note. If so, please bring any errors to my attention for an addendum. All efforts were made to ensure that this office note is accurate.

## 2018-09-28 ENCOUNTER — OFFICE VISIT (OUTPATIENT)
Dept: FAMILY MEDICINE CLINIC | Age: 38
End: 2018-09-28
Payer: COMMERCIAL

## 2018-09-28 VITALS
WEIGHT: 221 LBS | DIASTOLIC BLOOD PRESSURE: 78 MMHG | BODY MASS INDEX: 34.69 KG/M2 | SYSTOLIC BLOOD PRESSURE: 130 MMHG | HEART RATE: 84 BPM | OXYGEN SATURATION: 100 %

## 2018-09-28 DIAGNOSIS — L30.9 DERMATITIS: ICD-10-CM

## 2018-09-28 DIAGNOSIS — H60.501 ACUTE OTITIS EXTERNA OF RIGHT EAR, UNSPECIFIED TYPE: Primary | ICD-10-CM

## 2018-09-28 DIAGNOSIS — Z23 NEED FOR INFLUENZA VACCINATION: ICD-10-CM

## 2018-09-28 DIAGNOSIS — E29.1 HYPOTESTOSTERONEMIA IN MALE: ICD-10-CM

## 2018-09-28 DIAGNOSIS — M25.50 ARTHRALGIA, UNSPECIFIED JOINT: ICD-10-CM

## 2018-09-28 LAB
A/G RATIO: 2.4 (ref 1.1–2.2)
ALBUMIN SERPL-MCNC: 5.1 G/DL (ref 3.4–5)
ALP BLD-CCNC: 83 U/L (ref 40–129)
ALT SERPL-CCNC: 111 U/L (ref 10–40)
ANION GAP SERPL CALCULATED.3IONS-SCNC: 16 MMOL/L (ref 3–16)
AST SERPL-CCNC: 45 U/L (ref 15–37)
BILIRUB SERPL-MCNC: 0.6 MG/DL (ref 0–1)
BUN BLDV-MCNC: 9 MG/DL (ref 7–20)
CALCIUM SERPL-MCNC: 9.8 MG/DL (ref 8.3–10.6)
CHLORIDE BLD-SCNC: 101 MMOL/L (ref 99–110)
CO2: 25 MMOL/L (ref 21–32)
CREAT SERPL-MCNC: 0.8 MG/DL (ref 0.9–1.3)
GFR AFRICAN AMERICAN: >60
GFR NON-AFRICAN AMERICAN: >60
GLOBULIN: 2.1 G/DL
GLUCOSE BLD-MCNC: 107 MG/DL (ref 70–99)
HCT VFR BLD CALC: 46 % (ref 40.5–52.5)
HEMOGLOBIN: 15.7 G/DL (ref 13.5–17.5)
MCH RBC QN AUTO: 29.5 PG (ref 26–34)
MCHC RBC AUTO-ENTMCNC: 34.3 G/DL (ref 31–36)
MCV RBC AUTO: 86 FL (ref 80–100)
PDW BLD-RTO: 13.6 % (ref 12.4–15.4)
PLATELET # BLD: 203 K/UL (ref 135–450)
PMV BLD AUTO: 9.4 FL (ref 5–10.5)
POTASSIUM SERPL-SCNC: 4.1 MMOL/L (ref 3.5–5.1)
RBC # BLD: 5.34 M/UL (ref 4.2–5.9)
RHEUMATOID FACTOR: <10 IU/ML
SEDIMENTATION RATE, ERYTHROCYTE: 4 MM/HR (ref 0–15)
SODIUM BLD-SCNC: 142 MMOL/L (ref 136–145)
TOTAL PROTEIN: 7.2 G/DL (ref 6.4–8.2)
WBC # BLD: 10.5 K/UL (ref 4–11)

## 2018-09-28 PROCEDURE — 90686 IIV4 VACC NO PRSV 0.5 ML IM: CPT | Performed by: PHYSICIAN ASSISTANT

## 2018-09-28 PROCEDURE — 90471 IMMUNIZATION ADMIN: CPT | Performed by: PHYSICIAN ASSISTANT

## 2018-09-28 PROCEDURE — 99204 OFFICE O/P NEW MOD 45 MIN: CPT | Performed by: PHYSICIAN ASSISTANT

## 2018-09-28 RX ORDER — CIPROFLOXACIN AND DEXAMETHASONE 3; 1 MG/ML; MG/ML
4 SUSPENSION/ DROPS AURICULAR (OTIC) 2 TIMES DAILY
Qty: 1 BOTTLE | Refills: 0 | Status: SHIPPED | OUTPATIENT
Start: 2018-09-28 | End: 2018-10-05

## 2018-09-28 RX ORDER — TESTOSTERONE GEL, 1% 10 MG/G
GEL TRANSDERMAL
COMMUNITY
Start: 2018-09-04 | End: 2019-08-01

## 2018-09-28 ASSESSMENT — ENCOUNTER SYMPTOMS
EYE DISCHARGE: 0
RHINORRHEA: 0
SHORTNESS OF BREATH: 0
SORE THROAT: 0
SINUS PRESSURE: 0
SINUS PAIN: 0
EYE ITCHING: 0
WHEEZING: 0

## 2018-09-28 ASSESSMENT — PATIENT HEALTH QUESTIONNAIRE - PHQ9
SUM OF ALL RESPONSES TO PHQ QUESTIONS 1-9: 0
SUM OF ALL RESPONSES TO PHQ9 QUESTIONS 1 & 2: 0
2. FEELING DOWN, DEPRESSED OR HOPELESS: 0
1. LITTLE INTEREST OR PLEASURE IN DOING THINGS: 0
SUM OF ALL RESPONSES TO PHQ QUESTIONS 1-9: 0

## 2018-09-28 NOTE — PROGRESS NOTES
2018    Lance Lopez (:  1980) is a 45 y.o. male, here for evaluation of the following medical concerns: bilateral otalgia, rash, arthitis. HPI    Bilateral ear otalgia: Seen at a clinic on last Friday and diagnosed with otitis media, has been on amoxicillin 500 mg BID for 10 days. Had right ear irrigated on Friday. Current symptoms: otalgia of bilateral ears. Denies any congestion, post- nasal drip, sore throat or cough. Has been using debrox drops in both ears at home. Rash and arthralgias:  Started 4-5 months ago on his right hip, knee for unknown longer period of time. Occurs on knees and elbows. Reports that the rash sometimes itches. Denies any burning sensation, blistering or drainage. Has used lotion with no improvement in symtpoms. Occasionally gets white and scaly. Has been told by his orthopedist that he has arthritis in his knees and should consider psoriatic arthritis. Pt has never had testing for arthritis. Pt does admit to arthritis in bilateral knees, shoulders and hips. Low Testosterone: [facility] Dr. [facility]. Feels that therapy is effective. Has elevated PSA, had tissue samples taken and reports that they were normal. Urology follows PSA level. Review of Systems   Constitutional: Negative for activity change, appetite change, chills and fever. HENT: Positive for ear discharge and ear pain. Negative for congestion, postnasal drip, rhinorrhea, sinus pain, sinus pressure and sore throat. Eyes: Negative for discharge and itching. Respiratory: Negative for shortness of breath and wheezing. Cardiovascular: Negative for chest pain. Genitourinary: Negative for difficulty urinating. Musculoskeletal: Positive for arthralgias. Negative for gait problem and joint swelling. Skin: Positive for rash. Neurological: Negative for dizziness, light-headedness and headaches. Prior to Visit Medications    Medication Sig Taking?  Authorizing Provider   testosterone (ANDROGEL; TESTIM) 50 MG/5GM (1%) GEL 1% gel  Yes Historical Provider, MD   hydrocortisone 2.5 % ointment Apply topically 2 times daily for 14 days Apply topically 2 times daily to areas of eczema. Yes ULISES Hansen   ciprofloxacin-dexamethasone (CIPRODEX) 0.3-0.1 % otic suspension Place 4 drops into the right ear 2 times daily for 7 days Yes ULISES Hansen   ibuprofen (ADVIL;MOTRIN) 600 MG tablet Take 1 tablet by mouth every 6 hours as needed for Pain Yes ULISES Simpson        No Known Allergies    Past Medical History:   Diagnosis Date    Kidney stones        Past Surgical History:   Procedure Laterality Date    KNEE ARTHROSCOPY Right 1-19-16    Partial LATERAL AND MEDIAL MENISCECTOMY, CHONDROPLASTY, SYNOVECTOMY    KNEE ARTHROSCOPY Right 09/19/2017    KNEE SURGERY Left     SHOULDER SURGERY Right        Social History     Social History    Marital status:      Spouse name: N/A    Number of children: N/A    Years of education: N/A     Occupational History    Not on file. Social History Main Topics    Smoking status: Former Smoker     Packs/day: 0.00     Types: Cigarettes     Quit date: 9/12/2015    Smokeless tobacco: Never Used    Alcohol use 0.6 - 1.8 oz/week     1 - 3 Cans of beer per week    Drug use: No    Sexual activity: Not on file     Other Topics Concern    Not on file     Social History Narrative    No narrative on file        Family History   Problem Relation Age of Onset    Diabetes Other     Diabetes Father     Thyroid Disease Mother        Vitals:    09/28/18 1011   BP: 130/78   Site: Left Upper Arm   Position: Sitting   Cuff Size: Large Adult   Pulse: 84   SpO2: 100%   Weight: 221 lb (100.2 kg)     Estimated body mass index is 34.69 kg/m² as calculated from the following:    Height as of 11/17/17: 5' 6.93\" (1.7 m). Weight as of this encounter: 221 lb (100.2 kg).     Physical Exam   Constitutional: He is oriented to Arthralgia, unspecified joint  -  Possible psoriatic arthritis?   - RHEUMATOID FACTOR  - CCP Antibodies, IGG/IGA  - SEDIMENTATION RATE  - NETTIE  - COMPREHENSIVE METABOLIC PANEL  - CBC    3. Dermatitis  -  Consider derm referral  - hydrocortisone 2.5 % ointment; Apply topically 2 times daily for 14 days Apply topically 2 times daily to areas of eczema. Dispense: 28.35 g; Refill: 1    4. Need for influenza vaccination  - INFLUENZA, QUADV, 3 YRS AND OLDER, IM, PF, PREFILL SYR OR SDV, 0.5ML (FLUZONE QUADV, PF)    5. Hypotestosteronemia in male  -  Managed by Urology  - testosterone (ANDROGEL; TESTIM) 50 MG/5GM (1%) GEL 1% gel;       Return in about 1 year (around 9/28/2019) for physical.    An  electronic signature was used to authenticate this note.     --ULISES Rasheed on 9/28/2018 at 11:00 AM

## 2018-10-01 LAB
ANA INTERPRETATION: NORMAL
ANTI-NUCLEAR ANTIBODY (ANA): NEGATIVE

## 2018-10-05 DIAGNOSIS — R74.01 ELEVATED TRANSAMINASE LEVEL: ICD-10-CM

## 2018-10-05 DIAGNOSIS — Z13.220 SCREENING CHOLESTEROL LEVEL: Primary | ICD-10-CM

## 2018-10-05 LAB
Lab: NORMAL
REPORT: NORMAL
THIS TEST SENT TO: NORMAL

## 2018-12-26 ENCOUNTER — HOSPITAL ENCOUNTER (OUTPATIENT)
Age: 38
Discharge: HOME | End: 2018-12-26
Payer: COMMERCIAL

## 2018-12-26 DIAGNOSIS — Z13.220 SCREENING CHOLESTEROL LEVEL: ICD-10-CM

## 2018-12-26 DIAGNOSIS — R74.01 ELEVATED TRANSAMINASE LEVEL: ICD-10-CM

## 2018-12-26 LAB
ALBUMIN SERPL-MCNC: 4.4 G/DL (ref 3.4–5)
ALP BLD-CCNC: 74 U/L (ref 40–129)
ALT SERPL-CCNC: 159 U/L (ref 10–40)
AST SERPL-CCNC: 65 U/L (ref 15–37)
BILIRUB SERPL-MCNC: 0.7 MG/DL (ref 0–1)
BILIRUBIN DIRECT: <0.2 MG/DL (ref 0–0.3)
BILIRUBIN, INDIRECT: ABNORMAL MG/DL (ref 0–1)
CHOLESTEROL, TOTAL: 206 MG/DL (ref 0–199)
HCT VFR BLD CALC: 48.7 % (ref 40.5–52.5)
HDLC SERPL-MCNC: 33 MG/DL (ref 40–60)
HEMOGLOBIN: 16.9 G/DL (ref 13.5–17.5)
LDL CHOLESTEROL CALCULATED: 151 MG/DL
MCH RBC QN AUTO: 29.2 PG (ref 26–34)
MCHC RBC AUTO-ENTMCNC: 34.8 G/DL (ref 31–36)
MCV RBC AUTO: 83.8 FL (ref 80–100)
PDW BLD-RTO: 13.7 % (ref 12.4–15.4)
PLATELET # BLD: 183 K/UL (ref 135–450)
PMV BLD AUTO: 9.2 FL (ref 5–10.5)
PROSTATE SPECIFIC ANTIGEN: 3.47 NG/ML (ref 0–4)
RBC # BLD: 5.81 M/UL (ref 4.2–5.9)
TOTAL PROTEIN: 7.1 G/DL (ref 6.4–8.2)
TRIGL SERPL-MCNC: 111 MG/DL (ref 0–150)
VLDLC SERPL CALC-MCNC: 22 MG/DL
WBC # BLD: 9.2 K/UL (ref 4–11)

## 2018-12-26 PROCEDURE — 84403 ASSAY OF TOTAL TESTOSTERONE: CPT

## 2018-12-26 PROCEDURE — 85027 COMPLETE CBC AUTOMATED: CPT

## 2018-12-26 PROCEDURE — 36415 COLL VENOUS BLD VENIPUNCTURE: CPT

## 2018-12-26 PROCEDURE — 80076 HEPATIC FUNCTION PANEL: CPT

## 2018-12-26 PROCEDURE — 80061 LIPID PANEL: CPT

## 2018-12-26 PROCEDURE — 84153 ASSAY OF PSA TOTAL: CPT

## 2018-12-27 DIAGNOSIS — R74.8 ELEVATED LIVER ENZYMES: ICD-10-CM

## 2018-12-27 DIAGNOSIS — R74.8 ELEVATED LIVER ENZYMES: Primary | ICD-10-CM

## 2018-12-27 NOTE — PROGRESS NOTES
Patient informed of results and verbalized understanding. Wife states that he is on testosterone replacement and she was told that could cause elevation in liver enzymes.

## 2018-12-29 LAB — TESTOSTERONE TOTAL: 1051 NG/DL (ref 220–1000)

## 2019-08-01 ENCOUNTER — OFFICE VISIT (OUTPATIENT)
Dept: ORTHOPEDIC SURGERY | Age: 39
End: 2019-08-01
Payer: COMMERCIAL

## 2019-08-01 VITALS — HEIGHT: 68 IN | WEIGHT: 210 LBS | BODY MASS INDEX: 31.83 KG/M2

## 2019-08-01 DIAGNOSIS — S62.502A: ICD-10-CM

## 2019-08-01 DIAGNOSIS — M79.642 HAND PAIN, LEFT: Primary | ICD-10-CM

## 2019-08-01 PROCEDURE — 99203 OFFICE O/P NEW LOW 30 MIN: CPT | Performed by: ORTHOPAEDIC SURGERY

## 2020-02-14 ENCOUNTER — OFFICE VISIT (OUTPATIENT)
Dept: FAMILY MEDICINE CLINIC | Age: 40
End: 2020-02-14
Payer: COMMERCIAL

## 2020-02-14 ENCOUNTER — HOSPITAL ENCOUNTER (OUTPATIENT)
Dept: ULTRASOUND IMAGING | Age: 40
Discharge: HOME | End: 2020-02-14
Payer: COMMERCIAL

## 2020-02-14 VITALS
BODY MASS INDEX: 33.98 KG/M2 | OXYGEN SATURATION: 97 % | DIASTOLIC BLOOD PRESSURE: 74 MMHG | HEART RATE: 89 BPM | HEIGHT: 68 IN | TEMPERATURE: 98.5 F | SYSTOLIC BLOOD PRESSURE: 120 MMHG | WEIGHT: 224.2 LBS

## 2020-02-14 PROCEDURE — 93971 EXTREMITY STUDY: CPT

## 2020-02-14 PROCEDURE — 99213 OFFICE O/P EST LOW 20 MIN: CPT | Performed by: NURSE PRACTITIONER

## 2020-02-14 RX ORDER — DICLOFENAC SODIUM 75 MG/1
75 TABLET, DELAYED RELEASE ORAL 2 TIMES DAILY
Qty: 30 TABLET | Refills: 0 | Status: SHIPPED | OUTPATIENT
Start: 2020-02-14 | End: 2021-05-25 | Stop reason: ALTCHOICE

## 2020-02-14 ASSESSMENT — PATIENT HEALTH QUESTIONNAIRE - PHQ9
SUM OF ALL RESPONSES TO PHQ QUESTIONS 1-9: 0
1. LITTLE INTEREST OR PLEASURE IN DOING THINGS: 0
2. FEELING DOWN, DEPRESSED OR HOPELESS: 0
SUM OF ALL RESPONSES TO PHQ QUESTIONS 1-9: 0
SUM OF ALL RESPONSES TO PHQ9 QUESTIONS 1 & 2: 0

## 2020-02-14 ASSESSMENT — ENCOUNTER SYMPTOMS
DIARRHEA: 0
CHEST TIGHTNESS: 0
SHORTNESS OF BREATH: 0
BACK PAIN: 0
NAUSEA: 0

## 2020-02-14 NOTE — PROGRESS NOTES
Subjective:      Patient ID: Tessa Rangel is a 36 y.o. male. HPI     Yesterday morning started with cramping back of left calf. Comes and goes but never gone. Denies injury. Feels like serenity horse, sharp pressure back of leg. Denies travel recently, illness, inactivity. Review of Systems   Constitutional: Negative for chills, fever and unexpected weight change. Respiratory: Negative for chest tightness and shortness of breath. Cardiovascular: Negative for chest pain. Gastrointestinal: Negative for diarrhea and nausea. Musculoskeletal: Positive for arthralgias. Negative for back pain and gait problem. Neurological: Negative for headaches. Psychiatric/Behavioral: Negative. Objective:   Physical Exam  Constitutional:       General: He is not in acute distress. Appearance: Normal appearance. He is well-developed. Cardiovascular:      Rate and Rhythm: Normal rate and regular rhythm. Heart sounds: Normal heart sounds. Pulmonary:      Effort: Pulmonary effort is normal.      Breath sounds: Normal breath sounds. Abdominal:      General: Bowel sounds are normal.      Palpations: Abdomen is soft. Musculoskeletal: Normal range of motion. Comments: Left leg with positive Brady's. ROM intact. Neg for redness warmth. Skin:     General: Skin is warm and dry. Neurological:      Mental Status: He is alert and oriented to person, place, and time. Assessment:      1. Left calf pain      Plan:      1. Lloyd Jacobs was seen today for leg pain. Diagnoses and all orders for this visit:    Pain of left calf  -     US DUP LOWER EXTREMITY LEFT KALA;  Future              SABIHA BONDS, APRN - CNP

## 2021-05-24 ENCOUNTER — TELEPHONE (OUTPATIENT)
Dept: FAMILY MEDICINE CLINIC | Age: 41
End: 2021-05-24

## 2021-05-24 ENCOUNTER — HOSPITAL ENCOUNTER (EMERGENCY)
Age: 41
Discharge: HOME | End: 2021-05-24
Attending: EMERGENCY MEDICINE
Payer: COMMERCIAL

## 2021-05-24 ENCOUNTER — APPOINTMENT (OUTPATIENT)
Dept: CT IMAGING | Age: 41
End: 2021-05-24
Payer: COMMERCIAL

## 2021-05-24 ENCOUNTER — APPOINTMENT (OUTPATIENT)
Dept: GENERAL RADIOLOGY | Age: 41
End: 2021-05-24
Payer: COMMERCIAL

## 2021-05-24 ENCOUNTER — APPOINTMENT (OUTPATIENT)
Dept: ULTRASOUND IMAGING | Age: 41
End: 2021-05-24
Payer: COMMERCIAL

## 2021-05-24 VITALS
DIASTOLIC BLOOD PRESSURE: 95 MMHG | HEART RATE: 97 BPM | BODY MASS INDEX: 33.95 KG/M2 | HEIGHT: 68 IN | TEMPERATURE: 97.8 F | WEIGHT: 224 LBS | RESPIRATION RATE: 16 BRPM | SYSTOLIC BLOOD PRESSURE: 146 MMHG | OXYGEN SATURATION: 99 %
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
## 2021-05-24 LAB
A/G RATIO: 2.1 (ref 1.1–2.2)
ABO/RH: NORMAL
ALBUMIN SERPL-MCNC: 5 G/DL (ref 3.4–5)
ALP BLD-CCNC: 93 U/L (ref 40–129)
ALT SERPL-CCNC: 76 U/L (ref 10–40)
ANION GAP SERPL CALCULATED.3IONS-SCNC: 12 MMOL/L (ref 3–16)
ANTIBODY SCREEN: NORMAL
APTT: 26.5 SEC (ref 24.2–36.2)
AST SERPL-CCNC: 24 U/L (ref 15–37)
BASOPHILS ABSOLUTE: 0.1 K/UL (ref 0–0.2)
BASOPHILS RELATIVE PERCENT: 0.6 %
BILIRUB SERPL-MCNC: 0.4 MG/DL (ref 0–1)
BUN BLDV-MCNC: 16 MG/DL (ref 7–20)
CALCIUM SERPL-MCNC: 9.6 MG/DL (ref 8.3–10.6)
CHLORIDE BLD-SCNC: 101 MMOL/L (ref 99–110)
CO2: 26 MMOL/L (ref 21–32)
CREAT SERPL-MCNC: 0.9 MG/DL (ref 0.9–1.3)
EKG ATRIAL RATE: 86 BPM
EKG DIAGNOSIS: NORMAL
EKG P AXIS: 50 DEGREES
EKG P-R INTERVAL: 146 MS
EKG Q-T INTERVAL: 362 MS
EKG QRS DURATION: 88 MS
EKG QTC CALCULATION (BAZETT): 433 MS
EKG R AXIS: 42 DEGREES
EKG T AXIS: 22 DEGREES
EKG VENTRICULAR RATE: 86 BPM
EOSINOPHILS ABSOLUTE: 0.3 K/UL (ref 0–0.6)
EOSINOPHILS RELATIVE PERCENT: 2.5 %
ETHANOL: NORMAL MG/DL (ref 0–0.08)
GFR AFRICAN AMERICAN: >60
GFR NON-AFRICAN AMERICAN: >60
GLOBULIN: 2.4 G/DL
GLUCOSE BLD-MCNC: 217 MG/DL (ref 70–99)
HCT VFR BLD CALC: 47.5 % (ref 40.5–52.5)
HEMOGLOBIN: 16.4 G/DL (ref 13.5–17.5)
INR BLD: 0.95 (ref 0.86–1.14)
LIPASE: 61 U/L (ref 13–60)
LYMPHOCYTES ABSOLUTE: 3.8 K/UL (ref 1–5.1)
LYMPHOCYTES RELATIVE PERCENT: 33.4 %
MCH RBC QN AUTO: 29.2 PG (ref 26–34)
MCHC RBC AUTO-ENTMCNC: 34.5 G/DL (ref 31–36)
MCV RBC AUTO: 84.5 FL (ref 80–100)
MONOCYTES ABSOLUTE: 0.7 K/UL (ref 0–1.3)
MONOCYTES RELATIVE PERCENT: 5.7 %
NEUTROPHILS ABSOLUTE: 6.7 K/UL (ref 1.7–7.7)
NEUTROPHILS RELATIVE PERCENT: 57.8 %
PDW BLD-RTO: 12.6 % (ref 12.4–15.4)
PLATELET # BLD: 169 K/UL (ref 135–450)
PMV BLD AUTO: 9.6 FL (ref 5–10.5)
POTASSIUM SERPL-SCNC: 4 MMOL/L (ref 3.5–5.1)
PROTHROMBIN TIME: 11 SEC (ref 10–13.2)
RBC # BLD: 5.62 M/UL (ref 4.2–5.9)
SODIUM BLD-SCNC: 139 MMOL/L (ref 136–145)
TOTAL PROTEIN: 7.4 G/DL (ref 6.4–8.2)
TROPONIN: <0.01 NG/ML
WBC # BLD: 11.5 K/UL (ref 4–11)

## 2021-05-24 PROCEDURE — 2580000003 HC RX 258: Performed by: EMERGENCY MEDICINE

## 2021-05-24 PROCEDURE — 96374 THER/PROPH/DIAG INJ IV PUSH: CPT

## 2021-05-24 PROCEDURE — 85610 PROTHROMBIN TIME: CPT

## 2021-05-24 PROCEDURE — 83690 ASSAY OF LIPASE: CPT

## 2021-05-24 PROCEDURE — 86850 RBC ANTIBODY SCREEN: CPT

## 2021-05-24 PROCEDURE — 96376 TX/PRO/DX INJ SAME DRUG ADON: CPT

## 2021-05-24 PROCEDURE — 85730 THROMBOPLASTIN TIME PARTIAL: CPT

## 2021-05-24 PROCEDURE — 99285 EMERGENCY DEPT VISIT HI MDM: CPT

## 2021-05-24 PROCEDURE — 86900 BLOOD TYPING SEROLOGIC ABO: CPT

## 2021-05-24 PROCEDURE — 72125 CT NECK SPINE W/O DYE: CPT

## 2021-05-24 PROCEDURE — 76705 ECHO EXAM OF ABDOMEN: CPT

## 2021-05-24 PROCEDURE — 71260 CT THORAX DX C+: CPT

## 2021-05-24 PROCEDURE — 93010 ELECTROCARDIOGRAM REPORT: CPT | Performed by: INTERNAL MEDICINE

## 2021-05-24 PROCEDURE — 70450 CT HEAD/BRAIN W/O DYE: CPT

## 2021-05-24 PROCEDURE — 73030 X-RAY EXAM OF SHOULDER: CPT

## 2021-05-24 PROCEDURE — 3209999900 CT THORACIC SPINE TRAUMA RECONSTRUCTION

## 2021-05-24 PROCEDURE — 6360000002 HC RX W HCPCS

## 2021-05-24 PROCEDURE — 93005 ELECTROCARDIOGRAM TRACING: CPT | Performed by: EMERGENCY MEDICINE

## 2021-05-24 PROCEDURE — 84484 ASSAY OF TROPONIN QUANT: CPT

## 2021-05-24 PROCEDURE — 85025 COMPLETE CBC W/AUTO DIFF WBC: CPT

## 2021-05-24 PROCEDURE — 71045 X-RAY EXAM CHEST 1 VIEW: CPT

## 2021-05-24 PROCEDURE — 80053 COMPREHEN METABOLIC PANEL: CPT

## 2021-05-24 PROCEDURE — 3209999900 CT LUMBAR SPINE TRAUMA RECONSTRUCTION

## 2021-05-24 PROCEDURE — 6360000004 HC RX CONTRAST MEDICATION: Performed by: EMERGENCY MEDICINE

## 2021-05-24 PROCEDURE — 86901 BLOOD TYPING SEROLOGIC RH(D): CPT

## 2021-05-24 PROCEDURE — 82077 ASSAY SPEC XCP UR&BREATH IA: CPT

## 2021-05-24 RX ORDER — ONDANSETRON 2 MG/ML
INJECTION INTRAMUSCULAR; INTRAVENOUS
Status: COMPLETED
Start: 2021-05-24 | End: 2021-05-24

## 2021-05-24 RX ORDER — ONDANSETRON 2 MG/ML
4 INJECTION INTRAMUSCULAR; INTRAVENOUS EVERY 6 HOURS PRN
Status: DISCONTINUED | OUTPATIENT
Start: 2021-05-24 | End: 2021-05-24 | Stop reason: HOSPADM

## 2021-05-24 RX ORDER — 0.9 % SODIUM CHLORIDE 0.9 %
1000 INTRAVENOUS SOLUTION INTRAVENOUS ONCE
Status: COMPLETED | OUTPATIENT
Start: 2021-05-24 | End: 2021-05-24

## 2021-05-24 RX ADMIN — SODIUM CHLORIDE 1000 ML: 9 INJECTION, SOLUTION INTRAVENOUS at 05:36

## 2021-05-24 RX ADMIN — IOPAMIDOL 80 ML: 755 INJECTION, SOLUTION INTRAVENOUS at 06:33

## 2021-05-24 RX ADMIN — ONDANSETRON 4 MG: 2 INJECTION INTRAMUSCULAR; INTRAVENOUS at 05:36

## 2021-05-24 RX ADMIN — ONDANSETRON HYDROCHLORIDE 4 MG: 2 INJECTION, SOLUTION INTRAMUSCULAR; INTRAVENOUS at 05:36

## 2021-05-24 ASSESSMENT — PAIN DESCRIPTION - FREQUENCY: FREQUENCY: CONTINUOUS

## 2021-05-24 ASSESSMENT — PAIN DESCRIPTION - PROGRESSION: CLINICAL_PROGRESSION: GRADUALLY WORSENING

## 2021-05-24 ASSESSMENT — PAIN DESCRIPTION - DESCRIPTORS: DESCRIPTORS: ACHING

## 2021-05-24 ASSESSMENT — PAIN DESCRIPTION - ONSET: ONSET: GRADUAL

## 2021-05-24 NOTE — ED NOTES
Dr. Cheryle Knack called back from [facility] and accepted the patient to [facility]. Dr. Marie Tomlinson spoke to Dr. Cheryle Knack. Waiting on transport information.       Rose Pineda  [birth year] [facility]  [id number]

## 2021-05-24 NOTE — TELEPHONE ENCOUNTER
LAISHA for pt to call back    Patient was scheduled tomorrow at 8am with rigo for a video visit, Fatimah Valles would like him to come into the office since she hasn't seen him since 2018. If he calls back please see if he can come in.  Thank you
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
## 2021-05-25 ENCOUNTER — OFFICE VISIT (OUTPATIENT)
Dept: FAMILY MEDICINE CLINIC | Age: 41
End: 2021-05-25
Payer: COMMERCIAL

## 2021-05-25 VITALS
SYSTOLIC BLOOD PRESSURE: 130 MMHG | HEART RATE: 83 BPM | BODY MASS INDEX: 34.06 KG/M2 | HEIGHT: 67 IN | WEIGHT: 217 LBS | DIASTOLIC BLOOD PRESSURE: 80 MMHG | OXYGEN SATURATION: 98 %

## 2021-05-25 DIAGNOSIS — Z13.220 SCREENING CHOLESTEROL LEVEL: ICD-10-CM

## 2021-05-25 DIAGNOSIS — E66.9 CLASS 1 OBESITY WITHOUT SERIOUS COMORBIDITY WITH BODY MASS INDEX (BMI) OF 33.0 TO 33.9 IN ADULT, UNSPECIFIED OBESITY TYPE: ICD-10-CM

## 2021-05-25 DIAGNOSIS — R73.01 IMPAIRED FASTING GLUCOSE: ICD-10-CM
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
## 2021-05-25 DIAGNOSIS — R29.818 SUSPECTED SLEEP APNEA: ICD-10-CM

## 2021-05-25 DIAGNOSIS — M62.838 MUSCLE SPASM OF RIGHT SHOULDER: ICD-10-CM

## 2021-05-25 DIAGNOSIS — G44.319 ACUTE POST-TRAUMATIC HEADACHE, NOT INTRACTABLE: ICD-10-CM

## 2021-05-25 DIAGNOSIS — K76.0 HEPATIC STEATOSIS: ICD-10-CM

## 2021-05-25 PROBLEM — S62.502A: Status: RESOLVED | Noted: 2019-08-01 | Resolved: 2021-05-25

## 2021-05-25 PROBLEM — M94.269 CHONDROMALACIA, KNEE: Status: RESOLVED | Noted: 2017-08-30 | Resolved: 2021-05-25

## 2021-05-25 PROBLEM — E66.811 CLASS 1 OBESITY WITHOUT SERIOUS COMORBIDITY WITH BODY MASS INDEX (BMI) OF 33.0 TO 33.9 IN ADULT: Status: ACTIVE | Noted: 2021-05-25

## 2021-05-25 PROBLEM — L30.9 DERMATITIS: Status: RESOLVED | Noted: 2018-09-28 | Resolved: 2021-05-25

## 2021-05-25 PROBLEM — E29.1 HYPOTESTOSTERONEMIA IN MALE: Status: RESOLVED | Noted: 2018-09-28 | Resolved: 2021-05-25

## 2021-05-25 PROBLEM — M25.50 ARTHRALGIA: Status: RESOLVED | Noted: 2018-09-28 | Resolved: 2021-05-25

## 2021-05-25 LAB
CHOLESTEROL, TOTAL: 214 MG/DL (ref 0–199)
HDLC SERPL-MCNC: 38 MG/DL (ref 40–60)
LDL CHOLESTEROL CALCULATED: 144 MG/DL
TRIGL SERPL-MCNC: 158 MG/DL (ref 0–150)
VLDLC SERPL CALC-MCNC: 32 MG/DL

## 2021-05-25 PROCEDURE — 99214 OFFICE O/P EST MOD 30 MIN: CPT | Performed by: PHYSICIAN ASSISTANT

## 2021-05-25 RX ORDER — CYCLOBENZAPRINE HCL 10 MG
10 TABLET ORAL 3 TIMES DAILY PRN
Qty: 21 TABLET | Refills: 0 | Status: SHIPPED | OUTPATIENT
Start: 2021-05-25 | End: 2021-06-04

## 2021-05-25 SDOH — ECONOMIC STABILITY: FOOD INSECURITY: WITHIN THE PAST 12 MONTHS, THE FOOD YOU BOUGHT JUST DIDN'T LAST AND YOU DIDN'T HAVE MONEY TO GET MORE.: NEVER TRUE

## 2021-05-25 ASSESSMENT — PATIENT HEALTH QUESTIONNAIRE - PHQ9
SUM OF ALL RESPONSES TO PHQ QUESTIONS 1-9: 0
1. LITTLE INTEREST OR PLEASURE IN DOING THINGS: 0

## 2021-05-25 ASSESSMENT — ENCOUNTER SYMPTOMS
PHOTOPHOBIA: 0
EYE PAIN: 0
ABDOMINAL PAIN: 0
BLOOD IN STOOL: 0
SHORTNESS OF BREATH: 0
BACK PAIN: 0

## 2021-05-25 ASSESSMENT — SOCIAL DETERMINANTS OF HEALTH (SDOH): HOW HARD IS IT FOR YOU TO PAY FOR THE VERY BASICS LIKE FOOD, HOUSING, MEDICAL CARE, AND HEATING?: NOT VERY HARD
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
## 2021-05-25 NOTE — LETTER
[address]  [facility] [address]  Phone: [phone]  Fax: [phone]    Barney Rosas, [address]        May 25, 2021     Patient: Augusto Joy   YOB: 1980   Date of Visit: 5/25/2021       To Whom It May Concern: It is my medical opinion that Fernando Steinberg may return to work on 05/31/2021. If you have any questions or concerns, please don't hesitate to call.     Sincerely,          ULISES Urena

## 2021-05-26 LAB
ESTIMATED AVERAGE GLUCOSE: 191.5 MG/DL
HBA1C MFR BLD: 8.3 %

## 2021-06-01 ENCOUNTER — OFFICE VISIT (OUTPATIENT)
Dept: FAMILY MEDICINE CLINIC | Age: 41
End: 2021-06-01
Payer: COMMERCIAL

## 2021-06-01 VITALS
HEIGHT: 67 IN | TEMPERATURE: 97.1 F | HEART RATE: 89 BPM | SYSTOLIC BLOOD PRESSURE: 132 MMHG | BODY MASS INDEX: 34.94 KG/M2 | OXYGEN SATURATION: 97 % | DIASTOLIC BLOOD PRESSURE: 88 MMHG | WEIGHT: 222.6 LBS

## 2021-06-01 DIAGNOSIS — E11.9 ENCOUNTER FOR DIABETIC FOOT EXAM (HCC): ICD-10-CM

## 2021-06-01 DIAGNOSIS — Z71.89 ENCOUNTER FOR DIABETES EDUCATION: ICD-10-CM

## 2021-06-01 DIAGNOSIS — E11.9 NEW ONSET TYPE 2 DIABETES MELLITUS (HCC): Primary | ICD-10-CM

## 2021-06-01 PROBLEM — R73.01 IMPAIRED FASTING GLUCOSE: Status: RESOLVED | Noted: 2021-05-25 | Resolved: 2021-06-01

## 2021-06-01 PROCEDURE — 3052F HG A1C>EQUAL 8.0%<EQUAL 9.0%: CPT | Performed by: PHYSICIAN ASSISTANT

## 2021-06-01 PROCEDURE — 99213 OFFICE O/P EST LOW 20 MIN: CPT | Performed by: PHYSICIAN ASSISTANT

## 2021-06-01 RX ORDER — LANCETS 30 GAUGE
1 EACH MISCELLANEOUS DAILY
Qty: 100 EACH | Refills: 3 | Status: SHIPPED | OUTPATIENT
Start: 2021-06-01 | End: 2022-11-04

## 2021-06-01 RX ORDER — GLUCOSAMINE HCL/CHONDROITIN SU 500-400 MG
CAPSULE ORAL
Qty: 100 STRIP | Refills: 1 | Status: SHIPPED | OUTPATIENT
Start: 2021-06-01

## 2021-06-01 RX ORDER — METFORMIN HYDROCHLORIDE 500 MG/1
500 TABLET, EXTENDED RELEASE ORAL
Qty: 90 TABLET | Refills: 1 | Status: SHIPPED | OUTPATIENT
Start: 2021-06-01 | End: 2021-09-22 | Stop reason: SDUPTHER

## 2021-06-01 RX ORDER — PEN NEEDLE, DIABETIC 31 GX5/16"
1 NEEDLE, DISPOSABLE MISCELLANEOUS DAILY
Qty: 90 EACH | Refills: 1 | Status: SHIPPED | OUTPATIENT
Start: 2021-06-01 | End: 2022-11-04

## 2021-06-01 RX ORDER — BLOOD-GLUCOSE METER
1 KIT MISCELLANEOUS DAILY
Qty: 1 DEVICE | Refills: 0 | Status: SHIPPED | OUTPATIENT
Start: 2021-06-01

## 2021-06-01 ASSESSMENT — ENCOUNTER SYMPTOMS
SHORTNESS OF BREATH: 0
ABDOMINAL PAIN: 0

## 2021-06-01 NOTE — PROGRESS NOTES
2021  Alethea Ba (: 1980)  39 y.o.      HPI  The pt is here for new diabetic appointment. Hemoglobin A1C is 8.3%  Diet: standard american diet, drinks alcohol and other carb heavy drinks  Exercise: nothing regular outside of work  Denies: neuropathy, vision change, excessive thirst or urination  +family hx of diabetes in his father    Review of Systems   Constitutional: Positive for fatigue. Negative for diaphoresis and unexpected weight change. Eyes: Negative for visual disturbance. Respiratory: Negative for shortness of breath. Cardiovascular: Negative for chest pain, palpitations and leg swelling. Gastrointestinal: Negative for abdominal pain. Endocrine: Negative for polydipsia, polyphagia and polyuria. Neurological: Negative for headaches. Allergies, past medical history, family history, and social history reviewed and unchanged from previous encounter. Current Outpatient Medications   Medication Sig Dispense Refill    Blood Glucose Monitoring Suppl (FREESTYLE LITE) ROSA 1 Device by Does not apply route daily 1 Device 0    blood glucose monitor strips Test one times a day & as needed for symptoms of irregular blood glucose. 100 strip 1    Lancets MISC 1 each by Does not apply route daily 100 each 3    Alcohol Swabs (ALCOHOL PREP) PADS 1 Units by Does not apply route daily 90 each 1    metFORMIN (GLUCOPHAGE-XR) 500 MG extended release tablet Take 1 tablet by mouth daily (with breakfast) 90 tablet 1    cyclobenzaprine (FLEXERIL) 10 MG tablet Take 1 tablet by mouth 3 times daily as needed for Muscle spasms (Patient not taking: Reported on 2021) 21 tablet 0     No current facility-administered medications for this visit.        Vitals:    21 0930   BP: 132/88   Site: Left Upper Arm   Position: Sitting   Cuff Size: Large Adult   Pulse: 89   Temp: 97.1 °F (36.2 °C)   TempSrc: Oral   SpO2: 97%   Weight: 222 lb 9.6 oz (101 kg)   Height: 5' 7\" (1.702 m) Estimated body mass index is 34.86 kg/m² as calculated from the following:    Height as of this encounter: 5' 7\" (1.702 m). Weight as of this encounter: 222 lb 9.6 oz (101 kg). Physical Exam  Vitals reviewed. Constitutional:       Appearance: He is obese. HENT:      Head: Normocephalic and atraumatic. Eyes:      Conjunctiva/sclera: Conjunctivae normal.   Cardiovascular:      Rate and Rhythm: Normal rate and regular rhythm. Heart sounds: Normal heart sounds. Pulmonary:      Effort: Pulmonary effort is normal.      Breath sounds: Normal breath sounds. Neurological:      Mental Status: He is alert. Visual inspection:  Deformity/amputation: absent  Skin lesions/pre-ulcerative calluses: absent  Edema: right- negative, left- negative    Sensory exam:  Monofilament sensation: normal  (minimum of 5 random plantar locations tested, avoiding callused areas - > 1 area with absence of sensation is + for neuropathy)    Plus at least one of the following:  Pulses: normal    ASSESSMENT and PLAN:  Galina Red was seen today for discuss labs. Diagnoses and all orders for this visit:    New onset type 2 diabetes mellitus ([facility] [address]  -     Blood Glucose Monitoring Suppl (FREESTYLE LITE) ROSA; 1 Device by Does not apply route daily  -     blood glucose monitor strips; Test one times a day & as needed for symptoms of irregular blood glucose. -     Lancets MISC; 1 each by Does not apply route daily  -     Alcohol Swabs (ALCOHOL PREP) PADS; 1 Units by Does not apply route daily  -     Diabetic Foot Exam  -     metFORMIN (GLUCOPHAGE-XR) 500 MG extended release tablet; Take 1 tablet by mouth daily (with breakfast)  -     Medication risks, benefits and side effects were discussed with the pt. Close follow up in three months. Consider adding a statin at next appt. Encounter for diabetes education        -   Pt declined formal diabetic education program. We discussed nutrition that promotes improved blood sugar.  I

## 2021-09-22 ENCOUNTER — OFFICE VISIT (OUTPATIENT)
Dept: FAMILY MEDICINE CLINIC | Age: 41
End: 2021-09-22
Payer: COMMERCIAL

## 2021-09-22 VITALS
BODY MASS INDEX: 33.83 KG/M2 | OXYGEN SATURATION: 100 % | DIASTOLIC BLOOD PRESSURE: 80 MMHG | WEIGHT: 216 LBS | HEART RATE: 51 BPM | SYSTOLIC BLOOD PRESSURE: 120 MMHG

## 2021-09-22 DIAGNOSIS — E11.9 TYPE 2 DIABETES MELLITUS WITHOUT COMPLICATION, WITHOUT LONG-TERM CURRENT USE OF INSULIN (HCC): Primary | ICD-10-CM

## 2021-09-22 LAB
CREATININE URINE POCT: 200
HBA1C MFR BLD: 7.2 %
MICROALBUMIN/CREAT 24H UR: 80 MG/G{CREAT}
MICROALBUMIN/CREAT UR-RTO: ABNORMAL

## 2021-09-22 PROCEDURE — 90732 PPSV23 VACC 2 YRS+ SUBQ/IM: CPT | Performed by: PHYSICIAN ASSISTANT

## 2021-09-22 PROCEDURE — 82044 UR ALBUMIN SEMIQUANTITATIVE: CPT | Performed by: PHYSICIAN ASSISTANT

## 2021-09-22 PROCEDURE — 90471 IMMUNIZATION ADMIN: CPT | Performed by: PHYSICIAN ASSISTANT

## 2021-09-22 PROCEDURE — 3051F HG A1C>EQUAL 7.0%<8.0%: CPT | Performed by: PHYSICIAN ASSISTANT

## 2021-09-22 PROCEDURE — 99213 OFFICE O/P EST LOW 20 MIN: CPT | Performed by: PHYSICIAN ASSISTANT

## 2021-09-22 PROCEDURE — 83036 HEMOGLOBIN GLYCOSYLATED A1C: CPT | Performed by: PHYSICIAN ASSISTANT

## 2021-09-22 RX ORDER — LISINOPRIL 5 MG/1
2.5 TABLET ORAL DAILY
Qty: 45 TABLET | Refills: 1 | Status: SHIPPED | OUTPATIENT
Start: 2021-09-22 | End: 2022-07-26

## 2021-09-22 RX ORDER — METFORMIN HYDROCHLORIDE 500 MG/1
500 TABLET, EXTENDED RELEASE ORAL
Qty: 90 TABLET | Refills: 1 | Status: SHIPPED | OUTPATIENT
Start: 2021-09-22 | End: 2022-03-25 | Stop reason: SDUPTHER

## 2021-09-22 ASSESSMENT — ENCOUNTER SYMPTOMS: SHORTNESS OF BREATH: 0

## 2021-09-22 NOTE — PROGRESS NOTES
Cole Alpers (:  1980) is a 39 y.o. male,Established patient, here for evaluation of the following chief complaint(s):  Diabetes (follow up )         ASSESSMENT/PLAN:  1. Type 2 diabetes mellitus without complication, without long-term current use of insulin (HCC)  -     POCT glycosylated hemoglobin (Hb A1C): 7.2%  -     POCT microalbumin: abnormal: lisinopril 2.5 mg sent in to the pharmacy for the pt  -     PNEUMOVAX 23 subcutaneous/IM (Pneumococcal polysaccharide vaccine 23-valent >= 1yo)  -     metFORMIN (GLUCOPHAGE-XR) 500 MG extended release tablet; Take 1 tablet by mouth daily (with breakfast), Disp-90 tablet, R-1Normal  -     Continue with current medication. Follow up in 6 months. Continue to work on decreasing portion sizes of carbohydrates. Return in about 6 months (around 3/22/2022) for DM. Subjective   SUBJECTIVE/OBJECTIVE:  HPI  DM:  Current medication: metformin  Side effects: none  Fasting glucose at home:100-130  Signs and Symptoms of hyperglycemia or hypoglycemia:no  Diabetic retinal exam: not since he found out he had diabetes  Diabetic microalbuminuria: due  Diet: cutting back on portions    Review of Systems   Constitutional: Negative for activity change, appetite change, fatigue, fever and unexpected weight change. Eyes: Negative for visual disturbance. Respiratory: Negative for shortness of breath. Cardiovascular: Negative for chest pain, palpitations and leg swelling. Skin: Negative for pallor. Neurological: Negative for dizziness, light-headedness, numbness and headaches. Hematological: Negative for adenopathy. Objective   Physical Exam  Vitals reviewed. Constitutional:       Appearance: Normal appearance. He is obese. Cardiovascular:      Rate and Rhythm: Normal rate and regular rhythm. Heart sounds: Normal heart sounds. Pulmonary:      Effort: Pulmonary effort is normal.      Breath sounds: Normal breath sounds.    Musculoskeletal: Right lower leg: No edema. Left lower leg: No edema. Skin:     Coloration: Skin is not pale. Neurological:      Mental Status: He is alert. An electronic signature was used to authenticate this note.     --ULISES Diaz

## 2021-10-09 ENCOUNTER — TELEPHONE (OUTPATIENT)
Dept: FAMILY MEDICINE CLINIC | Age: 41
End: 2021-10-09

## 2021-10-09 DIAGNOSIS — B02.9 HERPES ZOSTER WITHOUT COMPLICATION: Primary | ICD-10-CM

## 2021-10-09 RX ORDER — VALACYCLOVIR HYDROCHLORIDE 1 G/1
1000 TABLET, FILM COATED ORAL 3 TIMES DAILY
Qty: 21 TABLET | Refills: 0 | Status: SHIPPED | OUTPATIENT
Start: 2021-10-09 | End: 2021-10-16

## 2021-10-09 NOTE — TELEPHONE ENCOUNTER
On call communication: Pt's wife (on hipaa) reports patient started complaining of a \"sore head\" earlier in the week. Sensitive scalp, tender to the touch. On Thursday started to develop \"lesions\"- look like small blisters. He was seen in an urgent care earlier today and given mupirocin and triamcinolone. Patient's wife is concerned that it is shingles. I discussed with pt's wife that without visualization I cannot diagnose this as shingles. However, given hpi, I will send in script for valacyclovir. Discussed with patient's wife that he needs to start the med within 72 hours of blister development. May continue ointments to prevent infection and pruritis but if it is shingles they will not treat.

## 2021-10-11 ENCOUNTER — TELEPHONE (OUTPATIENT)
Dept: FAMILY MEDICINE CLINIC | Age: 41
End: 2021-10-11

## 2021-10-11 NOTE — TELEPHONE ENCOUNTER
Pts wife calling stating that hes still in pain, and is wanting to be seen, there were no spots for today okay for pt to be seen tomorrow.  Please Advise

## 2021-10-11 NOTE — TELEPHONE ENCOUNTER
----- Message from Kjstephani Carlyn sent at 10/11/2021  7:16 AM EDT -----  Subject: Message to Provider    QUESTIONS  Information for Provider? Pt's wife calling in b/c she says she called the   a/h line on Saturday b/c she feels Pt has the shingles. She said she was   told to call back on Monday if the symptoms do not improve or gets worse   so she is calling back to make a appt. Upon scheduling she stated that he   was seen (at urgent care) w/in the last 14 days for this issue so it   prompted me to send a message. She would like someone to call her back   once office opens at [phone]  ---------------------------------------------------------------------------  --------------  [address]  What is the best way for the office to contact you? OK to leave message on   voicemail  Preferred Call Back Phone Number? [phone]  ---------------------------------------------------------------------------  --------------  SCRIPT ANSWERS  Relationship to Patient? Other  Representative Name? Jimena--wife  Additional information verified (besides Name and Date of Birth)? Address  Are you having swelling in your throat or face? No  Are you having difficulty breathing? No  Have the symptoms worsened or spread in the last day? No  Are you having fevers (100.4), chills or sweats? No  Have you recently (14 days) seen a provider for this issue?  Yes

## 2021-10-12 ENCOUNTER — OFFICE VISIT (OUTPATIENT)
Dept: FAMILY MEDICINE CLINIC | Age: 41
End: 2021-10-12
Payer: COMMERCIAL

## 2021-10-12 VITALS — RESPIRATION RATE: 16 BRPM | HEART RATE: 100 BPM | OXYGEN SATURATION: 98 % | TEMPERATURE: 97.3 F

## 2021-10-12 DIAGNOSIS — B02.9 HERPES ZOSTER WITHOUT COMPLICATION: Primary | ICD-10-CM

## 2021-10-12 PROCEDURE — 99213 OFFICE O/P EST LOW 20 MIN: CPT | Performed by: NURSE PRACTITIONER

## 2021-10-12 RX ORDER — NAPROXEN 375 MG/1
375 TABLET ORAL 2 TIMES DAILY PRN
Qty: 60 TABLET | Refills: 0 | Status: SHIPPED | OUTPATIENT
Start: 2021-10-12 | End: 2022-03-25

## 2021-10-12 RX ORDER — NAPROXEN 375 MG/1
375 TABLET ORAL 2 TIMES DAILY PRN
Qty: 60 TABLET | Refills: 0 | Status: SHIPPED
Start: 2021-10-12 | End: 2021-10-12 | Stop reason: ALTCHOICE

## 2021-10-12 ASSESSMENT — ENCOUNTER SYMPTOMS: COLOR CHANGE: 0

## 2021-10-12 NOTE — PROGRESS NOTES
Vitals:    10/12/21 1700   Pulse: 100   Resp: 16   Temp: 97.3 °F (36.3 °C)   TempSrc: Infrared   SpO2: 98%     Estimated body mass index is 33.83 kg/m² as calculated from the following:    Height as of 6/1/21: 5' 7\" (1.702 m). Weight as of 9/22/21: 216 lb (98 kg). Physical Exam  Vitals and nursing note reviewed. Constitutional:       Appearance: Normal appearance. HENT:      Head: Normocephalic. Pulmonary:      Effort: Pulmonary effort is normal.   Skin:     General: Skin is warm and dry. Findings: Rash present. Comments: Erythematous rash to scalp. No vesicles or weeping, some crusting. Neurological:      Mental Status: He is alert and oriented to person, place, and time. ASSESSMENT/PLAN:  1. Herpes zoster without complication  Advised pt to continue valacyclovir as directed. Considered high dose taper of prednisone, but the pt is diabetic. Will try naproxen to see if that helps with his pain. Will f/u as needed. - naproxen (NAPROSYN) 375 MG tablet; Take 1 tablet by mouth 2 times daily as needed for Pain  Dispense: 60 tablet; Refill: 0      Return if symptoms worsen or fail to improve. An electronic signature was used to authenticate this note.     --Dixie Nageotte, APRN - CNP on 10/12/2021 at 5:08 PM

## 2022-03-25 ENCOUNTER — OFFICE VISIT (OUTPATIENT)
Dept: FAMILY MEDICINE CLINIC | Age: 42
End: 2022-03-25
Payer: COMMERCIAL

## 2022-03-25 VITALS
OXYGEN SATURATION: 99 % | SYSTOLIC BLOOD PRESSURE: 120 MMHG | BODY MASS INDEX: 33.99 KG/M2 | WEIGHT: 217 LBS | HEART RATE: 58 BPM | DIASTOLIC BLOOD PRESSURE: 80 MMHG

## 2022-03-25 DIAGNOSIS — R79.89 LOW TESTOSTERONE IN MALE: ICD-10-CM

## 2022-03-25 DIAGNOSIS — E11.9 TYPE 2 DIABETES MELLITUS WITHOUT COMPLICATION, WITHOUT LONG-TERM CURRENT USE OF INSULIN (HCC): Primary | ICD-10-CM

## 2022-03-25 LAB — HBA1C MFR BLD: 7.1 %

## 2022-03-25 PROCEDURE — 3051F HG A1C>EQUAL 7.0%<8.0%: CPT | Performed by: PHYSICIAN ASSISTANT

## 2022-03-25 PROCEDURE — 99214 OFFICE O/P EST MOD 30 MIN: CPT | Performed by: PHYSICIAN ASSISTANT

## 2022-03-25 PROCEDURE — 83036 HEMOGLOBIN GLYCOSYLATED A1C: CPT | Performed by: PHYSICIAN ASSISTANT

## 2022-03-25 RX ORDER — METFORMIN HYDROCHLORIDE 500 MG/1
1000 TABLET, EXTENDED RELEASE ORAL
Qty: 180 TABLET | Refills: 1 | Status: SHIPPED | OUTPATIENT
Start: 2022-03-25 | End: 2022-11-04

## 2022-03-25 ASSESSMENT — ENCOUNTER SYMPTOMS
WHEEZING: 0
COUGH: 0
SHORTNESS OF BREATH: 0

## 2022-03-25 NOTE — PROGRESS NOTES
Rosa Maria Lisa (:  1980) is a 43 y.o. male,Established patient, here for evaluation of the following chief complaint(s):  Diabetes (follow up )         ASSESSMENT/PLAN:  1. Type 2 diabetes mellitus without complication, without long-term current use of insulin (HCC)  -     POCT glycosylated hemoglobin (Hb A1C): 7.1%  -     metFORMIN (GLUCOPHAGE-XR) 500 MG extended release tablet; Take 2 tablets by mouth daily (with breakfast), Disp-180 tablet, R-1Normal  -     Increase metformin due to elevated fasting glucose, discussed plate method of eating, increasing physical activity as tolerated  2. Low testosterone in male  -     Testosterone, free, total; Future        -    If low, would place referral either back to the urology group or endo    Return in about 6 months (around 2022) for DM. Subjective   SUBJECTIVE/OBJECTIVE:  HPI  DM:  Current medications: metformin 500 mg daily  Side effects: none  Blood sugar readings at home: 125-200  Diabetic eye exam: due, goes to [facility]  Diet: avoids potatoes and starches, eating two meals per day, light snack at work  Exercise: [employer] baseball  Works third shift      Hx of low testosterone  Seen at the urology group  Could not afford gel  Reports low libido, fatigue  Unsure if this is due to his work schedule or if testosterone is low again      Review of Systems   Constitutional: Positive for fatigue. Negative for activity change, appetite change and unexpected weight change. Eyes: Negative for visual disturbance. Respiratory: Negative for cough, shortness of breath and wheezing. Cardiovascular: Negative for chest pain, palpitations and leg swelling. Endocrine: Negative for polydipsia, polyphagia and polyuria. Neurological: Negative for dizziness, weakness and numbness. Hematological: Negative for adenopathy. Objective   Physical Exam  Vitals reviewed. Constitutional:       Appearance: Normal appearance. He is obese.    HENT: Head: Normocephalic and atraumatic. Eyes:      Conjunctiva/sclera: Conjunctivae normal.      Pupils: Pupils are equal, round, and reactive to light. Cardiovascular:      Rate and Rhythm: Normal rate and regular rhythm. Heart sounds: Normal heart sounds. Pulmonary:      Effort: Pulmonary effort is normal.      Breath sounds: Normal breath sounds. Musculoskeletal:      Right lower leg: No edema. Left lower leg: No edema. Neurological:      Mental Status: He is alert and oriented to person, place, and time. Cranial Nerves: No cranial nerve deficit. An electronic signature was used to authenticate this note.     --ULISES Hall

## 2022-03-29 LAB
SEX HORMONE BINDING GLOBULIN: 22 NMOL/L (ref 11–80)
TESTOSTERONE FREE-NONMALE: 69.8 PG/ML (ref 47–244)
TESTOSTERONE TOTAL: 287 NG/DL (ref 220–1000)

## 2022-06-06 ENCOUNTER — OFFICE VISIT (OUTPATIENT)
Dept: FAMILY MEDICINE CLINIC | Age: 42
End: 2022-06-06
Payer: COMMERCIAL

## 2022-06-06 VITALS
HEIGHT: 67 IN | HEART RATE: 54 BPM | OXYGEN SATURATION: 99 % | WEIGHT: 217 LBS | SYSTOLIC BLOOD PRESSURE: 138 MMHG | DIASTOLIC BLOOD PRESSURE: 70 MMHG | BODY MASS INDEX: 34.06 KG/M2

## 2022-06-06 DIAGNOSIS — J01.90 ACUTE BACTERIAL SINUSITIS: Primary | ICD-10-CM

## 2022-06-06 DIAGNOSIS — I49.3 FREQUENT PVCS: ICD-10-CM

## 2022-06-06 DIAGNOSIS — R94.31 ABNORMAL EKG: ICD-10-CM

## 2022-06-06 DIAGNOSIS — B96.89 ACUTE BACTERIAL SINUSITIS: Primary | ICD-10-CM

## 2022-06-06 PROCEDURE — 93000 ELECTROCARDIOGRAM COMPLETE: CPT | Performed by: PHYSICIAN ASSISTANT

## 2022-06-06 PROCEDURE — 99214 OFFICE O/P EST MOD 30 MIN: CPT | Performed by: PHYSICIAN ASSISTANT

## 2022-06-06 RX ORDER — BENZONATATE 200 MG/1
200 CAPSULE ORAL 3 TIMES DAILY PRN
Qty: 30 CAPSULE | Refills: 0 | Status: SHIPPED | OUTPATIENT
Start: 2022-06-06 | End: 2022-06-13

## 2022-06-06 RX ORDER — AMOXICILLIN AND CLAVULANATE POTASSIUM 875; 125 MG/1; MG/1
1 TABLET, FILM COATED ORAL 2 TIMES DAILY
Qty: 14 TABLET | Refills: 0 | Status: SHIPPED | OUTPATIENT
Start: 2022-06-06 | End: 2022-06-13

## 2022-06-06 SDOH — ECONOMIC STABILITY: INCOME INSECURITY: IN THE LAST 12 MONTHS, WAS THERE A TIME WHEN YOU WERE NOT ABLE TO PAY THE MORTGAGE OR RENT ON TIME?: NO

## 2022-06-06 SDOH — ECONOMIC STABILITY: FOOD INSECURITY: WITHIN THE PAST 12 MONTHS, THE FOOD YOU BOUGHT JUST DIDN'T LAST AND YOU DIDN'T HAVE MONEY TO GET MORE.: NEVER TRUE

## 2022-06-06 SDOH — ECONOMIC STABILITY: FOOD INSECURITY: WITHIN THE PAST 12 MONTHS, YOU WORRIED THAT YOUR FOOD WOULD RUN OUT BEFORE YOU GOT MONEY TO BUY MORE.: NEVER TRUE

## 2022-06-06 SDOH — ECONOMIC STABILITY: TRANSPORTATION INSECURITY
IN THE PAST 12 MONTHS, HAS THE LACK OF TRANSPORTATION KEPT YOU FROM MEDICAL APPOINTMENTS OR FROM GETTING MEDICATIONS?: NO

## 2022-06-06 SDOH — ECONOMIC STABILITY: HOUSING INSECURITY
IN THE LAST 12 MONTHS, WAS THERE A TIME WHEN YOU DID NOT HAVE A STEADY PLACE TO SLEEP OR SLEPT IN A SHELTER (INCLUDING NOW)?: NO

## 2022-06-06 SDOH — ECONOMIC STABILITY: TRANSPORTATION INSECURITY
IN THE PAST 12 MONTHS, HAS LACK OF TRANSPORTATION KEPT YOU FROM MEETINGS, WORK, OR FROM GETTING THINGS NEEDED FOR DAILY LIVING?: NO

## 2022-06-06 SDOH — ECONOMIC STABILITY: HOUSING INSECURITY: IN THE LAST 12 MONTHS, HOW MANY PLACES HAVE YOU LIVED?: 0

## 2022-06-06 ASSESSMENT — ENCOUNTER SYMPTOMS
SHORTNESS OF BREATH: 0
CHEST TIGHTNESS: 0
SINUS PAIN: 0
WHEEZING: 0
RHINORRHEA: 1
SORE THROAT: 1
SINUS PRESSURE: 1
COUGH: 1

## 2022-06-06 ASSESSMENT — PATIENT HEALTH QUESTIONNAIRE - PHQ9
SUM OF ALL RESPONSES TO PHQ9 QUESTIONS 1 & 2: 0
SUM OF ALL RESPONSES TO PHQ QUESTIONS 1-9: 0
2. FEELING DOWN, DEPRESSED OR HOPELESS: 0
SUM OF ALL RESPONSES TO PHQ QUESTIONS 1-9: 0
SUM OF ALL RESPONSES TO PHQ QUESTIONS 1-9: 0
1. LITTLE INTEREST OR PLEASURE IN DOING THINGS: 0
SUM OF ALL RESPONSES TO PHQ QUESTIONS 1-9: 0

## 2022-06-06 ASSESSMENT — SOCIAL DETERMINANTS OF HEALTH (SDOH): HOW HARD IS IT FOR YOU TO PAY FOR THE VERY BASICS LIKE FOOD, HOUSING, MEDICAL CARE, AND HEATING?: NOT HARD AT ALL

## 2022-06-06 NOTE — PROGRESS NOTES
Marianela Harrington (:  1980) is a 43 y.o. male,{New vs Established:[id number]::\"Established patient\"}, here for evaluation of the following chief complaint(s):  Cough (x's 4-5 days ), Other (sneezin), and Rib Pain         ASSESSMENT/PLAN:  {There are no diagnoses linked to this encounter. (Refresh or delete this SmartLink)}    No follow-ups on file. Subjective   SUBJECTIVE/OBJECTIVE:  HPI    Review of Systems       Objective   Physical Exam       {Time Documentation Optional:[id number]}      An electronic signature was used to authenticate this note.     --ULISES Villegas

## 2022-06-06 NOTE — PROGRESS NOTES
Mickey Min (:  1980) is a 43 y.o. male,Established patient, here for evaluation of the following chief complaint(s):  Cough (x's 4-5 days ), Other (sneezin), and Rib Pain         ASSESSMENT/PLAN:  1. Acute bacterial sinusitis  -     amoxicillin-clavulanate (AUGMENTIN) 875-125 MG per tablet; Take 1 tablet by mouth 2 times daily for 7 days, Disp-14 tablet, R-0Normal  -     benzonatate (TESSALON) 200 MG capsule; Take 1 capsule by mouth 3 times daily as needed for Cough, Disp-30 capsule, R-0Normal  -    Start flonase, follow up as needed if symptoms do not improve  2. Abnormal EKG  -     EKG 12 Lead; Future: bigmeny PVCs noted, pt currently asymptomatic and vitals are stable  3. Frequent PVCs  -     Holter Monitor 24 Hour; Future for further evaluation      Return in about 3 months (around 2022) for DM. Subjective   SUBJECTIVE/OBJECTIVE:  HPI  The pt is here for evaluation of suspected cold virus  Symptoms started 7 days ago and are unchanged  Right side rib pain: feels muscular from sneezing  Please see ROS  Treatments: tylenol, no improvement  [place] [facility] and noted to have multiple PVCs  States that he has left work a few times for feeling dizzy but otherwise denies any cardiac or respiratory symptoms other than current acute symptoms    Review of Systems   Constitutional: Negative for chills, fatigue and fever. HENT: Positive for congestion, rhinorrhea, sinus pressure, sneezing and sore throat. Negative for postnasal drip and sinus pain. Eyes: Negative for visual disturbance. Respiratory: Positive for cough. Negative for chest tightness, shortness of breath and wheezing. Cardiovascular: Negative for chest pain, palpitations and leg swelling. Neurological: Positive for dizziness. Negative for light-headedness and headaches. Objective   Physical Exam  Vitals reviewed. Constitutional:       Appearance: Normal appearance. He is normal weight. HENT:      Head: Normocephalic and atraumatic. Eyes:      Extraocular Movements: Extraocular movements intact. Conjunctiva/sclera: Conjunctivae normal.   Cardiovascular:      Rate and Rhythm: Normal rate and regular rhythm. Heart sounds: Normal heart sounds. Pulmonary:      Effort: Pulmonary effort is normal.      Breath sounds: Normal breath sounds. Musculoskeletal:      Right lower leg: No edema. Left lower leg: No edema. Neurological:      Mental Status: He is alert and oriented to person, place, and time. Cranial Nerves: No cranial nerve deficit. An electronic signature was used to authenticate this note.     --ULISES Penaloza

## 2022-06-07 ENCOUNTER — HOSPITAL ENCOUNTER (OUTPATIENT)
Dept: NON INVASIVE DIAGNOSTICS | Age: 42
Discharge: HOME | End: 2022-06-07
Payer: COMMERCIAL

## 2022-06-07 DIAGNOSIS — I49.3 FREQUENT PVCS: ICD-10-CM

## 2022-06-07 PROCEDURE — 93226 XTRNL ECG REC<48 HR SCAN A/R: CPT

## 2022-06-07 PROCEDURE — 93225 XTRNL ECG REC<48 HRS REC: CPT

## 2022-06-10 DIAGNOSIS — R94.31 HOLTER MONITOR, ABNORMAL: Primary | ICD-10-CM

## 2022-06-10 LAB
ACQUISITION DURATION: NORMAL S
AVERAGE HEART RATE: 84 BPM
EKG DIAGNOSIS: NORMAL
HOLTER MAX HEART RATE: 126 BPM
HOOKUP DATE: NORMAL
HOOKUP TIME: NORMAL
MAX HEART RATE TIME/DATE: NORMAL
MIN HEART RATE TIME/DATE: NORMAL
MIN HEART RATE: 63 BPM
NUMBER OF QRS COMPLEXES: NORMAL
NUMBER OF SUPRAVENTRICULAR COUPLETS: 0
NUMBER OF SUPRAVENTRICULAR ECTOPICS: 0
NUMBER OF SUPRAVENTRICULAR ISOLATED BEATS: 0
NUMBER OF VENTRICULAR BIGEMINAL CYCLES: 2225
NUMBER OF VENTRICULAR COUPLETS: 21
NUMBER OF VENTRICULAR ECTOPICS: NORMAL

## 2022-06-10 RX ORDER — METOPROLOL SUCCINATE 25 MG/1
50 TABLET, EXTENDED RELEASE ORAL DAILY
Qty: 90 TABLET | Refills: 1 | Status: SHIPPED | OUTPATIENT
Start: 2022-06-10 | End: 2022-09-02 | Stop reason: ALTCHOICE

## 2022-07-07 NOTE — PROGRESS NOTES
[address] [facility]   Cardiovascular Evaluation    PATIENT: Esequiel Philip: 2022  MRN: [id number]  CSN: [id number]  : 1980      Primary Care Doctor: ULISES Alba  Reason for evaluation:   Frequent PVCs    Subjective:   History of present illness on initial date of evaluation:  Axel Messina is a 43 y.o. male with a history of obesity, type 2 DM, hepatic steatosis, and tobacco use referred for further evaluation of frequent PVCs. The patient was noted to have frequent PVCs in a pattern of ventricular bigeminy on an EKG obtained on 22 during a clinic visit where he was diagnosed with acute bacterial sinusitis. At that time, he was prescribed a 24-hour Holter monitor which showed a 24.7% PVC burden. He was subsequently started on metoprolol succinate 50 mg daily which he says he has been taking for about one month. In general, the patient reports that he has been feeling well and says that he never notices the PVCs. He says that he occasionally has a sensation like he can't take a deep breath and has some occasional mild, non-limiting lightheadedness, while at work. He denies any chest pain, palpitations, syncope, lower extremity, or orthopnea. His wife says that he snores and he states that he sometimes wakes up with headaches. He works third shift and has an irregular sleep schedule. He has never been formally evaluated for sleep apnea. He previously smoked 1 pack of cigarettes for 15 years and quit 5-6 years ago. He denies any history of heavy alcohol consumption or recreational drug use. His son has Down's syndrome and tetralogy of Fallot and has had two open heart surgeries. He is unaware of any other heart disease in his family. and suspected sleep apnea. He saw his PCP 2022 and had an EKG which showed NSR with bigmeny PVC's noted.  He wore a 24-hour holter monitor which showed underlying NSR with frequent PVC's and ventricular couplets. Today, 7/13/2022, patient reports feeling overall well. He was started on metoprolol for PVC's by his PCP, and has been taking for about 1 month. He has been tolerating this well. He states he never notices his PVC's, they were found during a PCP office visit. He states occasionally when he goes to take a deep breath he doesn't feel like he can get a full breath. He has had random occasional episodes of dizziness while he is at work, states this happens when he has a sinus infection sometimes. He states his wife tells him that he snores. He states he occasionally will wake up with headaches, states his sleep schedule is off due to working third shift. He has never been evaluated for sleep apnea. He is unaware of any heart disease in his family. He states his son has Down Syndrome and had two open heart surgeries for tetralgoy of fallot. He is a previous smoker, quit 5-6 years ago. Smoked for about 15 years, roughly 1PPD. He states he drinks caffeine but has cut back on this. He denies heavy alcohol use consistently, will drink socially. Denies any illicit drug use. Denies any fevers, chills, nausea, or diarrhea. Patient Active Problem List   Diagnosis    Class 1 obesity without serious comorbidity with body mass index (BMI) of 33.0 to 33.9 in adult    Hepatic steatosis    Suspected sleep apnea    Muscle spasm of right shoulder    Acute post-traumatic headache, not intractable    Type 2 diabetes mellitus without complication, without long-term current use of insulin (HCC)         Past Medical History:   has a past medical history of Kidney stones. Surgical History:   has a past surgical history that includes shoulder surgery (Right); knee surgery (Left); Knee arthroscopy (Right, 1-19-16); and Knee arthroscopy (Right, 09/19/2017). Social History:   reports that he quit smoking about 6 years ago. His smoking use included cigarettes. He smoked 0.00 packs per day.  He has never used smokeless tobacco. He reports current alcohol use of about 1.0 - 3.0 standard drink of alcohol per week. He reports that he does not use drugs. Family History:  Family History   Problem Relation Age of Onset    Diabetes Other     Diabetes Father     Thyroid Disease Mother     No Known Problems Brother     No Known Problems Brother     No Known Problems Brother    Son - Down Syndrome, tetralogy of Fallot      Home Medications:  Reviewed and are listed in nursing record. and/or listed below  Current Outpatient Medications   Medication Sig Dispense Refill    metoprolol succinate (TOPROL XL) 25 MG extended release tablet Take 2 tablets by mouth daily 90 tablet 1    metFORMIN (GLUCOPHAGE-XR) 500 MG extended release tablet Take 2 tablets by mouth daily (with breakfast) 180 tablet 1    lisinopril (PRINIVIL;ZESTRIL) 5 MG tablet Take 0.5 tablets by mouth daily 45 tablet 1    Blood Glucose Monitoring Suppl (FREESTYLE LITE) ROSA 1 Device by Does not apply route daily 1 Device 0    blood glucose monitor strips Test one times a day & as needed for symptoms of irregular blood glucose. 100 strip 1    Lancets MISC 1 each by Does not apply route daily 100 each 3    Alcohol Swabs (ALCOHOL PREP) PADS 1 Units by Does not apply route daily 90 each 1     No current facility-administered medications for this visit. Allergies:  Patient has no known allergies. Review of Systems:   Review of Systems   Constitutional:  Negative for chills and fever. HENT:  Negative for congestion, rhinorrhea and sore throat. Eyes:  Negative for photophobia, pain and visual disturbance. Respiratory:  Positive for shortness of breath. Negative for cough. Cardiovascular:  Negative for chest pain, palpitations and leg swelling. Gastrointestinal:  Negative for abdominal pain, blood in stool, constipation, diarrhea, nausea and vomiting. Endocrine: Negative for cold intolerance and heat intolerance.    Genitourinary:  Negative for dysuria and hematuria. Musculoskeletal:  Negative for arthralgias, joint swelling and myalgias. Skin:  Negative for rash and wound. Allergic/Immunologic: Negative for environmental allergies and food allergies. Neurological:  Positive for dizziness, light-headedness and headaches. Negative for syncope. Hematological:  Negative for adenopathy. Does not bruise/bleed easily. Psychiatric/Behavioral:  Negative for dysphoric mood. The patient is not nervous/anxious. Objective:   PHYSICAL EXAM:    Vitals:    07/13/22 1424   BP: 110/84   Pulse: 76   SpO2: 98%    Weight: 220 lb (99.8 kg)     Wt Readings from Last 3 Encounters:   07/13/22 220 lb (99.8 kg)   06/06/22 217 lb (98.4 kg)   03/25/22 217 lb (98.4 kg)     General: Obese adult female in no acute distress. Pleasant and interactive on exam.  HEENT: Normocephalic, atraumatic, non-icteric, hearing intact, nares normal, mucous membranes moist.  Neck: Supple, trachea midline. No adenopathy. No thyromegaly. No carotid bruits. No JVD. Heart: Irregular rhythm. Normal S1 and S2. No murmurs, gallops or rubs. Lungs: Normal respiratory effort. Clear to auscultation bilaterally. No wheezes, rales, or rhonchi. Abdomen: Soft, non-tender. Normoactive bowel sounds. No masses or organomegaly. Skin: No rashes, wounds, or lesions. Pulses: 2+ and symmetric. Extremities: No clubbing, cyanosis, or edema. Musculoskeletal: 5/5 strength in upper and lower extremities. Psych: Normal mood and affect. Neuro: Alert and oriented to person, place, and time. No focal deficits noted.       LABS   CBC:      Lab Results   Component Value Date/Time    WBC 11.5 05/24/2021 05:27 AM    RBC 5.62 05/24/2021 05:27 AM    HGB 16.4 05/24/2021 05:27 AM    HCT 47.5 05/24/2021 05:27 AM    MCV 84.5 05/24/2021 05:27 AM    RDW 12.6 05/24/2021 05:27 AM     05/24/2021 05:27 AM     CMP:  Lab Results   Component Value Date/Time     05/24/2021 05:27 AM    K 4.0 05/24/2021 05:27 AM     2021 05:27 AM    CO2 26 2021 05:27 AM    BUN 16 2021 05:27 AM    CREATININE 0.9 2021 05:27 AM    GFRAA >60 2021 05:27 AM    AGRATIO 2.1 2021 05:27 AM    LABGLOM >60 2021 05:27 AM    GLUCOSE 217 2021 05:27 AM    PROT 7.4 2021 05:27 AM    CALCIUM 9.6 2021 05:27 AM    BILITOT 0.4 2021 05:27 AM    ALKPHOS 93 2021 05:27 AM    AST 24 2021 05:27 AM    ALT 76 2021 05:27 AM     PT/INR:   No results found for: PTINR  Liver:  No components found for: CHLPL  Lab Results   Component Value Date    ALT 76 (H) 2021    AST 24 2021    ALKPHOS 93 2021    BILITOT 0.4 2021     Lab Results   Component Value Date    LABA1C 7.1 2022     Lipids:         Lab Results   Component Value Date    TRIG 158 (H) 2021    TRIG 111 2018            Lab Results   Component Value Date    HDL 38 (L) 2021    HDL 33 (L) 2018            Lab Results   Component Value Date    LDLCALC 144 (H) 2021    LDLCALC 151 (H) 2018            Lab Results   Component Value Date    LABVLDL 32 2021    LABVLDL 22 2018         CARDIAC DATA   LAST EKG 22:  Sinus rhythm with PVCs      ECHO: N/A    STRESS TEST: N/A    CARDIAC CATH: N/A    VASCULAR/OTHER IMAGIN-hour Holter Monitor 2022:  1. The rhythm was sinus. Average NM interval 0.16 average QRS duration 0.08.  2. Frequent PVC's and ventricular couplets. Overall, PVC burden 24.7%. Assessment:     1. Frequent PVCs - Appear to be relatively asymptomatic. 24-hour Holter monitor in  ordered by patient's PCP showed overall 24.7% PVC burden. He was subsequently started on metoprolol succinate which he has been taking for approximately one month now. Does not have any recent assessment of cardiac function on record. 2. Type 2 DM  3. Hepatic steatosis  4. Former tobacco use  5. Obesity  6. At risk for sleep apnea      Plan:     1.  Now that patient has been on beta-blocker for approximately one month, will repeat 48-hour Holter monitor to re-assess PVC burden, and if remains high, plan to refer to EP for further evaluation and consideration of future RFCA. 2. Obtain TTE for complete assessment of cardiac structure and function. 3. Check CBC, CMP, magnesium level, and TSH. 4. Refer to pulmonology for formal sleep study. Presence of sleep apnea can increase risk for development of cardiac arrhythmias, pulmonary hypertension, and right heart failure. 5. Continue metoprolol succinate 50 mg daily and lisinopril 2.5 mg daily. 6. Start atorvastatin 40 mg daily. 7. Continue to encourage avoidance of tobacco products. 8. Follow-up with me in one month. Scribe's attestation: This note was scribed in the presence of Toni Ortega DO by Vianey Trinh RN      It is a pleasure to assist in the care of Marsha Rodas. Please call with any questions. The scribes documentation has been prepared under my direction and personally reviewed by me in its entirety. I confirm that the note above accurately reflects all work, treatment, procedures, and medical decision making performed by me. I, Dr. Lida Buerger, personally performed the services described in this documentation as scribed by Vianey Trinh RN in my presence, and it is both accurate and complete to the best of our ability.        Lida Buerger, [address] [facility]  [phone] [facility]  [phone] [facility]

## 2022-07-13 ENCOUNTER — HOSPITAL ENCOUNTER (OUTPATIENT)
Age: 42
Discharge: HOME | End: 2022-07-13
Payer: COMMERCIAL

## 2022-07-13 ENCOUNTER — OFFICE VISIT (OUTPATIENT)
Dept: CARDIOLOGY CLINIC | Age: 42
End: 2022-07-13
Payer: COMMERCIAL

## 2022-07-13 VITALS
BODY MASS INDEX: 34.53 KG/M2 | HEIGHT: 67 IN | DIASTOLIC BLOOD PRESSURE: 84 MMHG | WEIGHT: 220 LBS | OXYGEN SATURATION: 98 % | HEART RATE: 76 BPM | SYSTOLIC BLOOD PRESSURE: 110 MMHG

## 2022-07-13 DIAGNOSIS — E66.09 CLASS 1 OBESITY DUE TO EXCESS CALORIES WITH SERIOUS COMORBIDITY AND BODY MASS INDEX (BMI) OF 34.0 TO 34.9 IN ADULT: ICD-10-CM

## 2022-07-13 DIAGNOSIS — Z76.89 ESTABLISHING CARE WITH NEW DOCTOR, ENCOUNTER FOR: ICD-10-CM

## 2022-07-13 DIAGNOSIS — Z87.891 FORMER TOBACCO USE: ICD-10-CM

## 2022-07-13 DIAGNOSIS — E11.9 TYPE 2 DIABETES MELLITUS WITHOUT COMPLICATION, UNSPECIFIED WHETHER LONG TERM INSULIN USE (HCC): ICD-10-CM

## 2022-07-13 DIAGNOSIS — Z91.89 AT RISK FOR SLEEP APNEA: ICD-10-CM

## 2022-07-13 DIAGNOSIS — I49.3 PVC'S (PREMATURE VENTRICULAR CONTRACTIONS): ICD-10-CM

## 2022-07-13 DIAGNOSIS — I49.3 FREQUENT PVCS: Primary | ICD-10-CM

## 2022-07-13 PROCEDURE — 85025 COMPLETE CBC W/AUTO DIFF WBC: CPT

## 2022-07-13 PROCEDURE — 84443 ASSAY THYROID STIM HORMONE: CPT

## 2022-07-13 PROCEDURE — 99204 OFFICE O/P NEW MOD 45 MIN: CPT | Performed by: INTERNAL MEDICINE

## 2022-07-13 PROCEDURE — 80053 COMPREHEN METABOLIC PANEL: CPT

## 2022-07-13 PROCEDURE — 93000 ELECTROCARDIOGRAM COMPLETE: CPT | Performed by: INTERNAL MEDICINE

## 2022-07-13 PROCEDURE — 36415 COLL VENOUS BLD VENIPUNCTURE: CPT

## 2022-07-13 PROCEDURE — 83735 ASSAY OF MAGNESIUM: CPT

## 2022-07-13 RX ORDER — ATORVASTATIN CALCIUM 40 MG/1
40 TABLET, FILM COATED ORAL DAILY
Qty: 90 TABLET | Refills: 1 | Status: SHIPPED | OUTPATIENT
Start: 2022-07-13

## 2022-07-13 NOTE — PATIENT INSTRUCTIONS
Patient Plan:  1. Recommend 48-hour Holter Monitor to re-evaluate PVC burden since being on metoprolol   -please schedule this at your convenience   2. Recommend Echo to assess heart strength and function   -you will call to schedule this  3. Labs now: CBC, TSH, magnesium, CMP  4. Recommend undergoing Sleep Apnea Evaluation   5. START Atorvastain 40mg once a day  6. Follow-up with me in 1 month     Your provider has ordered testing for further evaluation. An order/prescription has been included in your paper work.  To schedule outpatient testing, contact Central Scheduling by calling [address] [place], [place] [address]Y ([phone]).

## 2022-07-14 LAB
A/G RATIO: 2.7 (ref 1.1–2.2)
ALBUMIN SERPL-MCNC: 4.9 G/DL (ref 3.4–5)
ALP BLD-CCNC: 74 U/L (ref 40–129)
ALT SERPL-CCNC: 58 U/L (ref 10–40)
ANION GAP SERPL CALCULATED.3IONS-SCNC: 13 MMOL/L (ref 3–16)
AST SERPL-CCNC: 25 U/L (ref 15–37)
BASOPHILS ABSOLUTE: 0.1 K/UL (ref 0–0.2)
BASOPHILS RELATIVE PERCENT: 0.8 %
BILIRUB SERPL-MCNC: 0.5 MG/DL (ref 0–1)
BUN BLDV-MCNC: 14 MG/DL (ref 7–20)
CALCIUM SERPL-MCNC: 9.6 MG/DL (ref 8.3–10.6)
CHLORIDE BLD-SCNC: 103 MMOL/L (ref 99–110)
CO2: 22 MMOL/L (ref 21–32)
CREAT SERPL-MCNC: 0.9 MG/DL (ref 0.9–1.3)
EOSINOPHILS ABSOLUTE: 0.2 K/UL (ref 0–0.6)
EOSINOPHILS RELATIVE PERCENT: 2.1 %
GFR AFRICAN AMERICAN: >60
GFR NON-AFRICAN AMERICAN: >60
GLUCOSE BLD-MCNC: 127 MG/DL (ref 70–99)
HCT VFR BLD CALC: 45.5 % (ref 40.5–52.5)
HEMOGLOBIN: 15.6 G/DL (ref 13.5–17.5)
LYMPHOCYTES ABSOLUTE: 2.7 K/UL (ref 1–5.1)
LYMPHOCYTES RELATIVE PERCENT: 31.6 %
MAGNESIUM: 2.2 MG/DL (ref 1.8–2.4)
MCH RBC QN AUTO: 29 PG (ref 26–34)
MCHC RBC AUTO-ENTMCNC: 34.3 G/DL (ref 31–36)
MCV RBC AUTO: 84.5 FL (ref 80–100)
MONOCYTES ABSOLUTE: 0.5 K/UL (ref 0–1.3)
MONOCYTES RELATIVE PERCENT: 6.3 %
NEUTROPHILS ABSOLUTE: 5.2 K/UL (ref 1.7–7.7)
NEUTROPHILS RELATIVE PERCENT: 59.2 %
PDW BLD-RTO: 13.9 % (ref 12.4–15.4)
PLATELET # BLD: 180 K/UL (ref 135–450)
PMV BLD AUTO: 9.9 FL (ref 5–10.5)
POTASSIUM SERPL-SCNC: 4.6 MMOL/L (ref 3.5–5.1)
RBC # BLD: 5.38 M/UL (ref 4.2–5.9)
SODIUM BLD-SCNC: 138 MMOL/L (ref 136–145)
TOTAL PROTEIN: 6.7 G/DL (ref 6.4–8.2)
TSH REFLEX FT4: 2.23 UIU/ML (ref 0.27–4.2)
WBC # BLD: 8.7 K/UL (ref 4–11)

## 2022-07-15 ENCOUNTER — TELEPHONE (OUTPATIENT)
Dept: CARDIOLOGY CLINIC | Age: 42
End: 2022-07-15

## 2022-07-15 ENCOUNTER — NURSE ONLY (OUTPATIENT)
Dept: CARDIOLOGY CLINIC | Age: 42
End: 2022-07-15

## 2022-07-15 DIAGNOSIS — Z79.899 MEDICATION MANAGEMENT: Primary | ICD-10-CM

## 2022-07-15 NOTE — PROGRESS NOTES
Monitor placed by [employer] of monitor 48 hour  Monitor ordered by [employer]  Serial number [id number]  Kit ID  Activation successful prior to pt leaving office?  Yes

## 2022-07-18 ENCOUNTER — TELEPHONE (OUTPATIENT)
Dept: CARDIOLOGY CLINIC | Age: 42
End: 2022-07-18

## 2022-07-18 NOTE — TELEPHONE ENCOUNTER
07/18/22 Pt presented himself in office to drop off monitor. Monitor has been placed in [facility] basket.

## 2022-07-20 ASSESSMENT — ENCOUNTER SYMPTOMS
SHORTNESS OF BREATH: 1
BLOOD IN STOOL: 0
ABDOMINAL PAIN: 0
SORE THROAT: 0
PHOTOPHOBIA: 0
NAUSEA: 0
EYE PAIN: 0
RHINORRHEA: 0
COUGH: 0
VOMITING: 0
DIARRHEA: 0
CONSTIPATION: 0

## 2022-07-25 PROCEDURE — 93224 XTRNL ECG REC UP TO 48 HRS: CPT | Performed by: INTERNAL MEDICINE

## 2022-07-26 RX ORDER — LISINOPRIL 5 MG/1
TABLET ORAL
Qty: 45 TABLET | Refills: 1 | Status: SHIPPED | OUTPATIENT
Start: 2022-07-26

## 2022-07-26 NOTE — TELEPHONE ENCOUNTER
Refill Request     CONFIRM preferrred pharmacy with the patient. If Mail Order Rx - Pend for 90 day refill. Last Seen: Last Seen Department: 6/6/2022  Last Seen by PCP: 6/6/2022    Last Written: 09/22/2021 45 tablet 1 refill    Next Appointment:   Future Appointments   Date Time Provider Ness Gonzalez   8/3/2022 10:00 AM [facility]OM 01 [facility] [place] [facility]   8/3/2022  3:00 PM [facility]   8/24/2022  3:00 PM DO Claudia Hernandez [facility]       Message to [employer] to schedule appointment. Return in about 6 months (around 9/25/2022) for DM.         Requested Prescriptions     Pending Prescriptions Disp Refills    lisinopril (PRINIVIL;ZESTRIL) 5 MG tablet [Pharmacy Med Name: LISINOPRIL 5 MG TABLET] 15 tablet      Sig: TAKE 1/2 TABLET BY MOUTH DAILY

## 2022-07-28 ENCOUNTER — TELEPHONE (OUTPATIENT)
Dept: CARDIOLOGY CLINIC | Age: 42
End: 2022-07-28

## 2022-08-02 ENCOUNTER — OFFICE VISIT (OUTPATIENT)
Dept: CARDIOLOGY CLINIC | Age: 42
End: 2022-08-02
Payer: COMMERCIAL

## 2022-08-02 VITALS
WEIGHT: 219.5 LBS | OXYGEN SATURATION: 96 % | HEIGHT: 67 IN | SYSTOLIC BLOOD PRESSURE: 122 MMHG | HEART RATE: 60 BPM | DIASTOLIC BLOOD PRESSURE: 68 MMHG | BODY MASS INDEX: 34.45 KG/M2

## 2022-08-02 DIAGNOSIS — I49.9 IRREGULAR HEART RATE: Primary | ICD-10-CM

## 2022-08-02 DIAGNOSIS — I49.3 PVC (PREMATURE VENTRICULAR CONTRACTION): ICD-10-CM

## 2022-08-02 DIAGNOSIS — R06.83 SNORING: ICD-10-CM

## 2022-08-02 PROCEDURE — 93000 ELECTROCARDIOGRAM COMPLETE: CPT | Performed by: INTERNAL MEDICINE

## 2022-08-02 PROCEDURE — 99204 OFFICE O/P NEW MOD 45 MIN: CPT | Performed by: INTERNAL MEDICINE

## 2022-08-02 ASSESSMENT — ENCOUNTER SYMPTOMS
HEMATEMESIS: 0
SHORTNESS OF BREATH: 0
SNORING: 1
WHEEZING: 0
LEFT EYE: 0
HEMATOCHEZIA: 0
RIGHT EYE: 0
STRIDOR: 0

## 2022-08-02 NOTE — PROGRESS NOTES
Assessment:     1. Frequent PVC's: Patient has an elevated PVC count of approximately 21% of total beats. He is essentially asymptomatic. He has been on metoprolol sustained-release for a few weeks. His ECG today shows continuing significant burden of PVCs. The morphology of the PVC is left bundle branch block pattern with a left inferior axis and precordial transition between V2 and V3. This is highly suggestive of an outflow tract origin possibly from the RVOT but cannot exclude the possibility of originating from the LVOT or coronary cusp area. The patient is scheduled for an echocardiogram tomorrow to assess his cardiac structure and function. For now, we will continue beta-blocker therapy, follow-up on the echocardiogram results and repeat the Holter monitor in 3 to 4 weeks to reassess his PVC count on the current treatment plan. Furthermore, he is scheduled to see sleep medicine to obtain a sleep study. Obstructive sleep apnea can often lead to ventricular and atrial arrhythmias including PVCs. I recommended to the patient regular exercise and weight loss as this may help his condition. In the absence of symptoms, there is no urgency to proceed with any change in the treatment plan at this time. I had a discussion with the patient about the etiology of frequent PVCs and the risks associated with having a PVC count greater than 10 to 15%. I explained to him that such a high PVC burden can sometimes lead to LV systolic dysfunction. The patient had his questions answered to his satisfaction. He will follow-up with me after the echocardiogram and Holter monitor results are available. 2. Good blood pressure control    3. Suspected obstructive sleep apnea: Referred for sleep study and schedule to see sleep medicine tomorrow. 4. Diabetes mellitus: diagnosed in May 2021. Reports good overall blood glucose control at home. Plan:     1. Echocardiogram as previously scheduled on 08/03/202  2. Recommend increasing exercise and activity to promote gradual weight loss   3. Repeat 24 hours Holter monitor in 3 weeks to assess PVC burden. - Goal is to have PVC burden under 10%  4. Discussed further treatment options for PVC's   - Radiofrequency cathter ablation   - Medication management - we may need to switch medications  4. Continue with pulmonologist appointment to evaluate for sleep apnea. 5. Follow up with me in 4 weeks    Subjective:       Patient ID: Shahid Rivera is a 43 y.o. male. Chief Complaint:  Chief Complaint   Patient presents with    New Patient    Irregular Heart Beat     PVC       HPI    Patient is a pleasant 43 y.o. male who presents for evaluation of frequent premature ventricular contractions. The patient has a past medical history of obesity, type 2 DM, hepatic steatosis, and frequent PVC's. The patient was noted to have an EKG demonstrating frequent ventricular bigeminy on 06/06/2022 during an office visit where he was diagnosed with an acute sinus infection. Patient wore a cardiac event monitor from 07/15/2022 to 07/17/2022 which demonstrated predominately SR with an average HR of 77 (). PAC burden 0%, PVC burden 22%. He was started on Metoprolol 50 mg by his primary care physician. Office Visit (Dr. Madeline Smith, Interventional cardiology, 07/13/2022)    History of present illness on initial date of evaluation:  Shahid Rivera is a 43 y.o. male with a history of obesity, type 2 DM, hepatic steatosis, and tobacco use referred for further evaluation of frequent PVCs. The patient was noted to have frequent PVCs in a pattern of ventricular bigeminy on an EKG obtained on 6/6/22 during a clinic visit where he was diagnosed with acute bacterial sinusitis. At that time, he was prescribed a 24-hour Holter monitor which showed a 24.7% PVC burden. He was subsequently started on metoprolol succinate 50 mg daily which he says he has been taking for about one month.   In general, the patient reports that he has been feeling well and says that he never notices the PVCs. He says that he occasionally has a sensation like he can't take a deep breath and has some occasional mild, non-limiting lightheadedness, while at work. He denies any chest pain, palpitations, syncope, lower extremity, or orthopnea. His wife says that he snores and he states that he sometimes wakes up with headaches. He works third shift and has an irregular sleep schedule. He has never been formally evaluated for sleep apnea. He previously smoked 1 pack of cigarettes for 15 years and quit 5-6 years ago. He denies any history of heavy alcohol consumption or recreational drug use. His son has Down's syndrome and tetralogy of Fallot and has had two open heart surgeries. He is unaware of any other heart disease in his family. and suspected sleep apnea. He saw his PCP 6/6/2022 and had an EKG which showed NSR with bigmeny PVC's noted. He wore a 24-hour holter monitor which showed underlying NSR with frequent PVC's and ventricular couplets. Today, 7/13/2022, patient reports feeling overall well. He was started on metoprolol for PVC's by his PCP, and has been taking for about 1 month. He has been tolerating this well. He states he never notices his PVC's, they were found during a PCP office visit. He states occasionally when he goes to take a deep breath he doesn't feel like he can get a full breath. He has had random occasional episodes of dizziness while he is at work, states this happens when he has a sinus infection sometimes. He states his wife tells him that he snores. He states he occasionally will wake up with headaches, states his sleep schedule is off due to working third shift. He has never been evaluated for sleep apnea. He is unaware of any heart disease in his family. He states his son has Down Syndrome and had two open heart surgeries for tetralgoy of fallot.  He is a previous smoker, quit 5-6 years ago. Smoked for about 15 years, roughly 1PPD. He states he drinks caffeine but has cut back on this. He denies heavy alcohol use consistently, will drink socially. Denies any illicit drug use. Denies any fevers, chills, nausea, or diarrhea. Office Visit (EP clinic, 08/02/2022)  Patient presents to EP clinic today for assessment of his frequent ventricular arrhythmia. He is feeling well overall. He states his echocardiogram is scheduled for tomorrow. He reports that he does not feel the PVC's. He states that in May of 2021 he was diagnosed with diabetes and he was then found to have PVC's on and EKG. He reports that his blood sugar remains around the 120's at home. He reports that he does snore at night. He states he has an appointment tomorrow with the pulmonologist to evaluate for sleep apnea. He states he drinks one or two caffeine drinks a day. He reports he is a former smoker. He reports he is able to exercise with not shortness of breath or chest pain. He states he works nightshift as a . Patient denies current edema, chest pain, sob, palpitations, dizziness or syncope. Patient is taking all cardiac medications as prescribed and tolerates them well. Denies any recent issues with bleeding or bruising. No side effects reported from taking metoprolol. No alcohol use reported. No family history of sudden cardiac death or significant cardiac diseases. Review of Systems  Review of Systems   Constitutional: Negative for malaise/fatigue, weight gain and weight loss. HENT:  Negative for nosebleeds and stridor. Eyes:  Negative for vision loss in left eye and vision loss in right eye. Cardiovascular:  Negative for chest pain, dyspnea on exertion, leg swelling and syncope. Respiratory:  Positive for snoring. Negative for shortness of breath and wheezing. Hematologic/Lymphatic: Negative for bleeding problem. Does not bruise/bleed easily. Skin:  Negative for itching and rash. Musculoskeletal:  Negative for joint pain and joint swelling. Gastrointestinal:  Negative for hematemesis and hematochezia. Genitourinary:  Negative for dysuria and hematuria. Neurological:  Negative for dizziness and light-headedness. Psychiatric/Behavioral:  Negative for altered mental status. The patient is not nervous/anxious. Past Medical History:   Diagnosis Date    Kidney stones          Social History     Socioeconomic History    Marital status:      Spouse name: Not on file    Number of children: Not on file    Years of education: Not on file    Highest education level: Not on file   Occupational History    Not on file   Tobacco Use    Smoking status: Former     Packs/day: 0.00     Types: Cigarettes     Quit date: 2015     Years since quittin.8    Smokeless tobacco: Never   Vaping Use    Vaping Use: Every day    Start date: 2015    Substances: Always   Substance and Sexual Activity    Alcohol use: Yes     Alcohol/week: 1.0 - 3.0 standard drink     Types: 1 - 3 Cans of beer per week     Comment: occ    Drug use: No    Sexual activity: Yes     Partners: Female   Other Topics Concern    Not on file   Social History Narrative    Not on file     Social Determinants of Health     Financial Resource Strain: Low Risk     Difficulty of Paying Living Expenses: Not hard at all   Food Insecurity: No Food Insecurity    Worried About [email or web address] in the Last Year: Never true    [address] in the Last Year: Never true   Transportation Needs: No Transportation Needs    Lack of Transportation (Medical): No    Lack of Transportation (Non-Medical):  No   Physical Activity: Not on file   Stress: Not on file   Social Connections: Not on file   Intimate Partner Violence: Not on file   Housing Stability: Low Risk     Unable to Pay for Housing in the Last Year: No    Number of Places Lived in the Last Year: 0    Unstable Housing in the Last Year: No         Family History   Problem Relation Age of Onset    Diabetes Other     Diabetes Father     Thyroid Disease Mother     No Known Problems Brother     No Known Problems Brother     No Known Problems Brother          Objective:       /68   Pulse 60   Ht 5' 7\" (1.702 m)   Wt 219 lb 8 oz (99.6 kg)   SpO2 96%   BMI 34.38 kg/m²     Physical Exam  Constitutional:       Appearance: Normal appearance. HENT:      Head: Normocephalic and atraumatic. Nose: Nose normal. No rhinorrhea. Eyes:      General: No scleral icterus. Conjunctiva/sclera: Conjunctivae normal.   Cardiovascular:      Rate and Rhythm: Normal rate and regular rhythm. Pulmonary:      Effort: Pulmonary effort is normal.      Breath sounds: Normal breath sounds. Abdominal:      General: There is no distension. Musculoskeletal:         General: Normal range of motion. Cervical back: Normal range of motion and neck supple. Skin:     General: Skin is warm and dry. Neurological:      General: No focal deficit present. Mental Status: He is alert and oriented to person, place, and time. Psychiatric:         Mood and Affect: Mood normal.         Behavior: Behavior normal.       ECG Interpretation: (Date: 08/02/2022)  Rhythm: Sinus Rhythm  Rate: 86 BPM  PAC's / PVC's present:  Yes              ECG Interpretation:  (Date: 07/13/2022)  Rhythm: Sinus rhythm  Rate: 68 BPM  PAC's / PVC's present: Yes  Conduction abnormalities: None      Echocardiogram     N/A  Ordered but not completed     Stress Test   N/A      Current Medications     Current Outpatient Medications   Medication Sig Dispense Refill    lisinopril (PRINIVIL;ZESTRIL) 5 MG tablet TAKE 1/2 TABLET BY MOUTH DAILY 45 tablet 1    atorvastatin (LIPITOR) 40 MG tablet Take 1 tablet by mouth daily 90 tablet 1    metoprolol succinate (TOPROL XL) 25 MG extended release tablet Take 2 tablets by mouth daily 90 tablet 1    metFORMIN (GLUCOPHAGE-XR) 500 MG extended release tablet Take 2 tablets by mouth daily (with breakfast) 180 tablet 1    Blood Glucose Monitoring Suppl (FREESTYLE LITE) ROSA 1 Device by Does not apply route daily 1 Device 0    blood glucose monitor strips Test one times a day & as needed for symptoms of irregular blood glucose. 100 strip 1    Lancets MISC 1 each by Does not apply route daily 100 each 3    Alcohol Swabs (ALCOHOL PREP) PADS 1 Units by Does not apply route daily 90 each 1     No current facility-administered medications for this visit. Lab Review     Lab Results   Component Value Date/Time     07/13/2022 03:09 PM    K 4.6 07/13/2022 03:09 PM     07/13/2022 03:09 PM    CO2 22 07/13/2022 03:09 PM    BUN 14 07/13/2022 03:09 PM    CREATININE 0.9 07/13/2022 03:09 PM    GLUCOSE 127 07/13/2022 03:09 PM    CALCIUM 9.6 07/13/2022 03:09 PM        Lab Results   Component Value Date    WBC 8.7 07/13/2022    HGB 15.6 07/13/2022    HCT 45.5 07/13/2022    MCV 84.5 07/13/2022     07/13/2022       Lab Results   Component Value Date    TSHFT4 2.23 07/13/2022       No results found for: BNP    I, Susan Rowan RN, am scribing for and in the presence of Dr. Betsy Gann.  08/02/22 11:38 AM   Susan Rowan RN

## 2022-08-02 NOTE — PATIENT INSTRUCTIONS
Plan:     Echocardiogram as previously scheduled on 08/03/2022  Recommend increasing exercise and activity to promote gradual weight loss   Repeat 24 hours Holter monitor in 3 weeks to assess PVC burden. - Goal is to have PVC burden under 10%  4. Discussed further treatment options for PVC's   - Radiofrequency cathter ablation   - Medication management - we may need to switch medications  4. Continue with pulmonologist appointment to evaluate for sleep apnea.   5. Follow up with me in 4 weeks

## 2022-08-03 ENCOUNTER — OFFICE VISIT (OUTPATIENT)
Dept: PULMONOLOGY | Age: 42
End: 2022-08-03
Payer: COMMERCIAL

## 2022-08-03 ENCOUNTER — HOSPITAL ENCOUNTER (OUTPATIENT)
Dept: NON INVASIVE DIAGNOSTICS | Age: 42
Discharge: HOME | End: 2022-08-03
Payer: COMMERCIAL

## 2022-08-03 VITALS
OXYGEN SATURATION: 98 % | SYSTOLIC BLOOD PRESSURE: 120 MMHG | HEART RATE: 92 BPM | WEIGHT: 218 LBS | BODY MASS INDEX: 34.21 KG/M2 | DIASTOLIC BLOOD PRESSURE: 76 MMHG | HEIGHT: 67 IN

## 2022-08-03 DIAGNOSIS — R51.9 MORNING HEADACHE: ICD-10-CM

## 2022-08-03 DIAGNOSIS — R42 DIZZINESS, NONSPECIFIC: ICD-10-CM

## 2022-08-03 DIAGNOSIS — K76.0 HEPATIC STEATOSIS: ICD-10-CM

## 2022-08-03 DIAGNOSIS — I49.9 CARDIAC ARRHYTHMIA, UNSPECIFIED CARDIAC ARRHYTHMIA TYPE: ICD-10-CM

## 2022-08-03 DIAGNOSIS — I49.3 FREQUENT PVCS: ICD-10-CM

## 2022-08-03 DIAGNOSIS — E11.9 TYPE 2 DIABETES MELLITUS WITHOUT COMPLICATION, WITHOUT LONG-TERM CURRENT USE OF INSULIN (HCC): ICD-10-CM

## 2022-08-03 DIAGNOSIS — Z87.891 PERSONAL HISTORY OF TOBACCO USE: ICD-10-CM

## 2022-08-03 DIAGNOSIS — R06.83 SNORING: Primary | ICD-10-CM

## 2022-08-03 LAB
LV EF: 55 %
LVEF MODALITY: NORMAL

## 2022-08-03 PROCEDURE — 93306 TTE W/DOPPLER COMPLETE: CPT

## 2022-08-03 PROCEDURE — 99204 OFFICE O/P NEW MOD 45 MIN: CPT

## 2022-08-03 ASSESSMENT — SLEEP AND FATIGUE QUESTIONNAIRES
ESS TOTAL SCORE: 5
HOW LIKELY ARE YOU TO NOD OFF OR FALL ASLEEP WHILE LYING DOWN TO REST IN THE AFTERNOON WHEN CIRCUMSTANCES PERMIT: 2
HOW LIKELY ARE YOU TO NOD OFF OR FALL ASLEEP WHILE SITTING QUIETLY AFTER LUNCH WITHOUT ALCOHOL: 0
HOW LIKELY ARE YOU TO NOD OFF OR FALL ASLEEP WHILE WATCHING TV: 1
HOW LIKELY ARE YOU TO NOD OFF OR FALL ASLEEP WHEN YOU ARE A PASSENGER IN A CAR FOR AN HOUR WITHOUT A BREAK: 1
HOW LIKELY ARE YOU TO NOD OFF OR FALL ASLEEP WHILE SITTING AND TALKING TO SOMEONE: 0
HOW LIKELY ARE YOU TO NOD OFF OR FALL ASLEEP WHILE SITTING INACTIVE IN A PUBLIC PLACE: 0
NECK CIRCUMFERENCE (INCHES): 20
HOW LIKELY ARE YOU TO NOD OFF OR FALL ASLEEP WHILE SITTING AND READING: 1
HOW LIKELY ARE YOU TO NOD OFF OR FALL ASLEEP IN A CAR, WHILE STOPPED FOR A FEW MINUTES IN TRAFFIC: 0

## 2022-08-03 NOTE — PROGRESS NOTES
PULMONARY, CRITICAL CARE AND SLEEP MEDICINE   CC: Snoring  Referring Provider: Patient is being seen at the request of Dr. Olga Ortega for a consultation to evaluate for Obstructive Sleep Apnea. Presenting HPI 8/3/2022:  44 yo male who recently established with cardiology & electrophysiology after incidental finding of frequent PVCs. Presents with a several year history of mild to moderate snoring, no known modifying factors, no associated daytime sleepiness or observed apneas. No choking or gasping during sleep. Occasional morning headaches. No treatments tried so far. Has not been evaluated for sleep apnea in the past.  Pt works 3rd shift and has a very irregular sleep schedule. Usually sleeps about 6-8 hrs daily. [place] is 5. No car wrecks or near wrecks because of the sleepiness. No nodding off while driving. Has cut back on caffeine usage. No history of atrial fibrillation but large PVC burden even after starting metoprolol. No history of HTN. Former smoker, 1ppd x15 yrs, quit 6  ya. No known family history of SINAI.     reports that he quit smoking about 6 years ago. His smoking use included cigarettes. He has never used smokeless tobacco.    Past Medical History:   Diagnosis Date    Kidney stones      Past Surgical History:   Procedure Laterality Date    KNEE ARTHROSCOPY Right 1-19-16    Partial LATERAL AND MEDIAL MENISCECTOMY, CHONDROPLASTY, SYNOVECTOMY    KNEE ARTHROSCOPY Right 09/19/2017    KNEE SURGERY Left     SHOULDER SURGERY Right      No Known Allergies  Medication list was reviewed and updated as needed in Epic.    family history includes Diabetes in his father and another family member; No Known Problems in his brother, brother, and brother; Thyroid Disease in his mother. Son has Down's syndrome & tetralogy of fallot    Review of Systems: Complete Review of system reviewed with patient and noted on attached review of system sheet.      PHYSICAL EXAM:  Blood pressure 120/76, pulse 92, height 5' 7\" (1.702 m), weight 218 lb (98.9 kg), SpO2 98 %.'   218# Aug 2022;   Constitutional:  No acute distress. HENT:  Oropharynx is clear and moist. No thyromegaly. Mallampati class II airway with hypertrophied soft tissue structures. Eyes:  Conjunctivae are normal. Pupils equal, round, and reactive to light. No scleral icterus. Neck:  No tracheal deviation present. No obvious thyroid mass. Circumference is 20 inches. CV:  Normal rate, regular rhythm, normal heart sounds. No lower extremity edema. Pulm/Chest:  Clear breath sounds. No wheezes, rales or rhonchi. No accessory muscle usage or stridor. Moves air well. Abdominal:  Soft. No distension or obvious hernia. No tenderness or guarding. Musculoskeletal:  No cyanosis. No clubbing. No obvious joint deformity. Skin:  Skin is warm and dry. No rash or nodules on the exposed extremities. Psychiatric:  Normal mood. Withdrawn affect. Behavior is normal.    Neurological:  Alert, awake and oriented. No obvious cranial nerve deficits. Speech fluent    DATA:  Review of PCP, Cardiology & EP records    Holter monitor 6/7/2022: NSR with 24.7% PVCs & ventricular couplet burden    Had echo today - results pending     ASSESSMENT:  Snoring  AM headaches, intermittent  Cardiac arrhythmia - with significant asymptomatic PVC burden, refractory to Metoprolol   Dizziness episodes, intermittent at work  Comorbid conditions: Obesity, T2DM, hepatic steatosis,  Former smoker, 1ppd x15 yrs quit 6 ya    PLAN:   Sleep hygiene tips discussed and provided. Specifically cautioned: absolutely no driving motorized vehicles or operating heavy machinery while fatigued, drowsy or sleepy. Weight loss was recommended as a long-term intervention. Pathophysiology and complications of untreated SINAI were discussed with patient to include systemic hypertension, pulmonary hypertension, cardiovascular morbidities, car accidents and all cause mortality.    Discussed alternatives to CPAP therapy, including María and HNS.    Daytime PSG, evaluate for sleep-disordered breathing   F/U after sleep study; 31-90 days if receives machine    CC: Dr. Tere Howard, Dr. Sukhjinder Story PA-C

## 2022-08-03 NOTE — PATIENT INSTRUCTIONS
Great to meet you and take care Meredith Soliman!  -Charleen Fay      Never drive a car or operate a motorized vehicle while drowsy or sleepy. Sleep Hygiene. .. Important practices for better sleep:    Avoid naps. This will ensure you are sleepy at bedtime. If you have to take a nap, sleep less than 1 hour, before 3 pm.  SCHEDULE: Have a fixed bedtime and awakening time. The human body thrives on routines. Only deviate from these set sleep times about 1-2 hours on the weekends (more than this will start altering your internal clock). You will feel better keeping a regular sleep cycle and giving your body a dependable pattern, even (especially) if you are retired or not working. Use light to train your biological clock: When you get up in the morning, exposure yourself to bright lights. When preparing for bed, dim all the lights and avoid exposure to screens. Go to bed only when sleepy; this reduces the time you are awake in bed (which can lead to frustration and negative thoughts about sleep). If you can't fall asleep within 15-30 minutes, get up and do something boring until you feel sleepy again. Sit quietly in the dark or read the warranty on your refrigerator. Don't expose yourself to bright light during this time (especially screens), which would cue your brain that it is time to wake up. Regular exercise is recommended to help you deepen your sleep (and MANY other reasons), but timing is important--aim to exercise in the morning or early afternoon, not within 4-6 hours of your bedtime. Only use your bed for sleeping & intimacy. Do not use your bed as an office, workroom or recreation room. Let your mind/body \"know\" that the bed is associated with sleeping. Develop sleep rituals. Give your body clues it is time to slow down and sleep. Dim the lights and turn off all screens!  Examples include; yoga, deep breathing, listen to relaxing music, a cup of caffeine-free hot tea, a hot bath or a few minutes of reading LOCKED    IMPORTANT: We ask that you please phone the [address] [place] [address] Patient Pre-Services ([phone]) at least 3-5 days prior to your sleep study to pre-register. Failing to pre-register may ultimately cause your insurance to not pay for this procedure. Please be aware that [address] [place] [address] is a non-smoking facility. There is no smoking allowed anywhere on [facility] property at any time. Each patient room is a private room with cable television, WiFi and a private bathroom with shower facilities. The test itself consists of electrodes and sensors attached to specific areas of your scalp, face, chest and legs. We will also monitor respiratory effort, nasal and oral airflow and oxygen levels. The test will not hurt; it is completely painless and not invasive in any way. Please bring with you:  Appropriate nightclothes (pajamas, sweats, etc.), slippers and robe  All medications you need during your stay, including breathing medications, nebulizers and metered dose inhalers, as well as a complete list of all medications you are currently taking. Your own toiletries and hairdryer if you wish to shower before you leave  Current Photo I.D. and insurance card  We do not allow any pillows or bed linens from home due to health regulations  We recommend that you do not bring valuables with you to the Sleep Center as we cannot be responsible for any lost or damaged personal items. Please refrain from or reduce the use of caffeine and/or alcohol prior to your sleep study and avoid napping the day of your study as these will affect the accuracy of your test results. If you are ill the day of your test (cold, upper respiratory infection, flu, etc.) please call to reschedule your test as the test will not be accurate if you are ill. If you should need to cancel or reschedule your appointment, please call the Sleep Center at [phone] as soon as possible.  Please also call the Sleep Center directly to let us know if you have any special needs, dietary needs or for any further questions. If home study: Your home sleep test is scheduled to be picked up at the Sleep center located at [address] [place] [address] whitney on - at - . Address to Sleep Center: The Sleep Center at [address] [place] [address] [address], [address] [facility] [place], [facility] Dewey Lopez            Phone: [phone] Fax: [phone]    If you should need to cancel or reschedule your appointment, please call the Sleep Center at [phone] as soon as possible. We ask that you please phone the [facility] Patient Pre-Services ([phone]) at least 3-5 days prior to your sleep study to pre-register. Failing to pre-register may ultimately cause your insurance to not pay for this procedure. Please refrain from or reduce the use of caffeine and/or alcohol prior to your sleep study and avoid napping the day of your study as these will affect the accuracy of your test results.  [place] [address]  [phone] 7351 [facility], [address] [place] [address]       (3801 Katerina tyler) [phone] [address] [place] [address]  [place], [address]   [address] [place] [address] [phone] Ken [facility]  [address] [phone] [address] [place] [address].   [facility]  **Near [facility],[address] [phone]  [address], [address]   [address] [facility] [phone] or  270 [facility] [address], [address] [facility] De [facility] 8305 [address], Jeff César 10   [address] [place] [phone] opt4 opt2 [phone] Fax Order to them and they will forward to [facility] [phone] [address] [place] [address] [address], [address]  **[facility] [phone] or  [phone] [address] [address]  **Near [employer]   CPAP. [employer] - Bacilio Schein, davide DME  No insurance, Cash ONLY [phone]  [employer]-Cpap[employer] [phone]    CPAP. COM (online) [phone] Online   [employer] [phone]  [phone] 3408 [place], [place], [place]   [facility]  Oxygen/PAP supplies only [phone] [address] 3073 [facility], 1300 [facility], 1415 [facility]  Oxygen/[phone] [facility] 43, [address] [place] [address] Dr Alison Caceres  Oxygen/PAP/NIV [phone] [facility] steve[place], Ann 27   [facility]  Oxygen/PAP/NIV [phone] [address]  [address]., 254 [facility],2Nd Floor  Oxygen/PAP/ [address] [facility]   [address]  Oxygen/PAP/NIV [phone] 12708 [facility], [address] [facility] [address]   [facility] Respiratory  ([address])  Oxygen/ 533 5123 [facility] 399 50  [place], 99 [facility] 92  (Portable O2 Conctrator) [phone] [address]142 [facility],1St Floor  [place], Rosalina [facility] [[email or web address]][address]   Leading Respiratory  [phone] 76 301 [place], [address]  **Past [address]  ([address])  Oxygen/PAP/NIV   [phone]     [phone]   Vicki Bolden, [address]  **[address]  Oxygen/PAP/-[phone] [facility], 73 [facility]  Oxygen/[phone] [address] Salma Delgado [address] [facility], 300 [facility]  Oxygen/[phone] 309 N [facility] Ann asif 27   Ada Sewell  Oxygen/PAP/NIV [facility]2 [address] [place] [address], [place] [address]  Oxygen/PAP/-[phone] 5165 [address] Flowers  [facility], 2550 [facility]   [facility] [place]  Oxygen/-[phone] 620 [facility]  **West Side inside [address]  ([address])  Oxygen/PAP/NIV   [phone] [address]  [place], [address]  **[address] above [facility] -Rey suresh  [phone] [address]  Oxygen/-[phone] [facility]  **NE of [facility]  Oxygen/-[phone] [facility]. [address]   Patient Aids -- Rey suresh   Oxygen/PAP/NIV [phone] 225 [facility], [address] [place]   Patient Aids Sophie Spark  Oxygen/PAP/-[phone]  Option 4 [phone] 5940 [facility].  Franck Engle, 100 [facility]   [facility] [phone] Jennifer Avalos W Gad[address] [place], [address] [facility]  Oxygen/PAP/NIV [address] [place] [address] [address]kw[address] [facility] Labuis[facility] [address] [place] [address]  Oxygen/PAP/NIV [address] 1325 [facility]. [place], [address] [facility] [address] 434  Oxygen/PAP/NIV 47 [facility]  [phone] [address]. [place], [address]   Sonal Jeny  Oxygen/PAP/NIV [address], 8105 [facility]   Sleep Mgmt.  Associates  (formerly [facility])  CPAP only [address],6Th Floor 1850 [facility], [address]  **[address] B [facility] 60 [phone]    1-800 CPAP (store) [address] Service  [phone] [address] 511  544,[address]     David Rosenthal [place] [phone]  [phone]                 ICP ([address]) [address]         (no PAP equipment)  [phone]                 Updated 2/15/22

## 2022-08-16 ENCOUNTER — TELEPHONE (OUTPATIENT)
Dept: PULMONOLOGY | Age: 42
End: 2022-08-16

## 2022-08-16 DIAGNOSIS — R06.83 SNORING: Primary | ICD-10-CM

## 2022-08-16 DIAGNOSIS — R51.9 MORNING HEADACHE: ICD-10-CM

## 2022-08-16 DIAGNOSIS — K76.0 HEPATIC STEATOSIS: ICD-10-CM

## 2022-08-16 DIAGNOSIS — E11.9 TYPE 2 DIABETES MELLITUS WITHOUT COMPLICATION, WITHOUT LONG-TERM CURRENT USE OF INSULIN (HCC): ICD-10-CM

## 2022-08-16 DIAGNOSIS — I49.9 CARDIAC ARRHYTHMIA, UNSPECIFIED CARDIAC ARRHYTHMIA TYPE: ICD-10-CM

## 2022-08-16 DIAGNOSIS — R42 DIZZINESS, NONSPECIFIC: ICD-10-CM

## 2022-08-16 NOTE — TELEPHONE ENCOUNTER
Called pt to let him know that [facility] could get sleep done quicker for him. Pt stated he needs a daytime sleep study due to his work schedule. Pt to keep appt here at sleep lab due to [facility] not doing daytime sleep studies.

## 2022-08-23 ENCOUNTER — PATIENT MESSAGE (OUTPATIENT)
Dept: CARDIOLOGY CLINIC | Age: 42
End: 2022-08-23

## 2022-08-23 NOTE — TELEPHONE ENCOUNTER
From: Cristobal Muse  To: Dr. Jessi Rain  Sent: 8/23/2022 8:57 AM EDT  Subject: Follow up visit     I am now seeing Dr. Trinh Degree for the frequent PVCs. Do I need to continue to follow up with you?

## 2022-08-26 ENCOUNTER — NURSE ONLY (OUTPATIENT)
Dept: CARDIOLOGY CLINIC | Age: 42
End: 2022-08-26

## 2022-08-26 ENCOUNTER — TELEPHONE (OUTPATIENT)
Dept: CARDIOLOGY CLINIC | Age: 42
End: 2022-08-26

## 2022-08-26 DIAGNOSIS — I49.3 PVC (PREMATURE VENTRICULAR CONTRACTION): Primary | ICD-10-CM

## 2022-08-29 ENCOUNTER — TELEPHONE (OUTPATIENT)
Dept: PULMONOLOGY | Age: 42
End: 2022-08-29

## 2022-08-29 NOTE — TELEPHONE ENCOUNTER
----- Message from Mark Anthony Rodas MA sent at 8/26/2022  6:50 PM EDT -----  Regarding: Tom Corrales has denied an in-lab PSG due to not being medically necessary. If you do not agree with this determination a peer to peer can be done by calling [phone] refer to reference ID # [id number]. Clinical Rationale: Your doctor told us that you have long term snoring and are much heavier than normal for your height. Your doctor ordered a sleep test to see how a person breathes when asleep. Your doctor wants this test to be done in a sleep lab setting. It is common for people to have this test done at home. You need to have a reason why this test cannot be done at home. These reasons might include lung or heart disease. We reviewed the notes we have. The notes do not show that you cannot have this test done at home. Based on the information we have, this test is not medically necessary. We used [facility] Special[facility] Health Guidelines titled Sleep Disorder Management, Polysomnography and Home Sleep Testing to make this decision. You may view this guideline at Zapniprfers.fr.

## 2022-08-29 NOTE — TELEPHONE ENCOUNTER
Unfortunately his insurance denied an in-lab study despite concern for arrhythmia involvement. Will have to perform an HST. I placed order.

## 2022-08-29 NOTE — TELEPHONE ENCOUNTER
Please see other telephone encounter:   \"Unfortunately his insurance denied an in-lab study despite concern for arrhythmia involvement. Will have to perform an HST. I placed order. \"   Please notify patient of situation.

## 2022-09-02 ENCOUNTER — OFFICE VISIT (OUTPATIENT)
Dept: CARDIOLOGY CLINIC | Age: 42
End: 2022-09-02
Payer: COMMERCIAL

## 2022-09-02 VITALS
BODY MASS INDEX: 34.47 KG/M2 | WEIGHT: 219.6 LBS | HEART RATE: 67 BPM | DIASTOLIC BLOOD PRESSURE: 88 MMHG | OXYGEN SATURATION: 97 % | SYSTOLIC BLOOD PRESSURE: 124 MMHG | HEIGHT: 67 IN

## 2022-09-02 DIAGNOSIS — R06.83 SNORING: ICD-10-CM

## 2022-09-02 DIAGNOSIS — R94.31 ABNORMAL ECG: ICD-10-CM

## 2022-09-02 DIAGNOSIS — I49.3 PVC (PREMATURE VENTRICULAR CONTRACTION): Primary | ICD-10-CM

## 2022-09-02 PROCEDURE — 93228 REMOTE 30 DAY ECG REV/REPORT: CPT | Performed by: INTERNAL MEDICINE

## 2022-09-02 PROCEDURE — 99214 OFFICE O/P EST MOD 30 MIN: CPT | Performed by: INTERNAL MEDICINE

## 2022-09-02 RX ORDER — DILTIAZEM HYDROCHLORIDE 180 MG/1
180 CAPSULE, COATED, EXTENDED RELEASE ORAL DAILY
Qty: 90 CAPSULE | Refills: 1 | Status: SHIPPED | OUTPATIENT
Start: 2022-09-02

## 2022-09-02 ASSESSMENT — ENCOUNTER SYMPTOMS
HEMATOCHEZIA: 0
RIGHT EYE: 0
LEFT EYE: 0
HEMATEMESIS: 0
SHORTNESS OF BREATH: 0
SNORING: 1
WHEEZING: 0
STRIDOR: 0

## 2022-09-02 NOTE — PROGRESS NOTES
Assessment:     1. Frequent PVC's: Patient presented with an elevated PVC count of approximately 21% of total beats. He has been essentially asymptomatic. He has been on metoprolol sustained-release for a few weeks. His ECG initial visit showed continuing significant burden of PVCs. The morphology of the PVC is left bundle branch block pattern with a left inferior axis and precordial transition between V2 and V3. This is highly suggestive of an outflow tract origin possibly from the RVOT but cannot exclude the possibility of originating from the LVOT or coronary cusp area. An echocardiogram showed normal overall cardiac structure and function. Repeat 24 hour Holter monitor on metoprolol showed no significant improvement (PVC count 20.5%). Again had a discussion with patient and his wife about treatment options (invasive versus non-invasive). Will go ahead and discontinue metoprolol and start diltiazem  mg daily. Furthermore, he is scheduled to see sleep medicine to obtain a sleep study. Obstructive sleep apnea can often lead to ventricular and atrial arrhythmias including PVCs. I recommended to the patient regular exercise and weight loss as this may help his condition. We will see how patient does and decide if to continue with pharmacological treatment versus the invasive ablation option. 2. Good blood pressure control    3. Suspected obstructive sleep apnea: Referred for sleep study and scheduled for sleep study in October. 4. Diabetes mellitus: diagnosed in May 2021. Reports good overall blood glucose control at home. Plan:     1. Stop taking Metoprolol   2. Discussed the risks and benefits of a PVC ablation   - We will defer at this time  3. Start Diltiazem 180 mg daily. 4. Follow up with me 2-3 months    Subjective:       Patient ID: Daniel Del Cid is a 43 y.o. male.       Chief Complaint:  Chief Complaint   Patient presents with    Follow-up    Irregular Heart Beat PVC's       SHERRI    Patient is a pleasant 43 y.o. male who presents for evaluation of frequent premature ventricular contractions. The patient has a past medical history of obesity, type 2 DM, hepatic steatosis, and frequent PVC's. The patient was noted to have an EKG demonstrating frequent ventricular bigeminy on 06/06/2022 during an office visit where he was diagnosed with an acute sinus infection. Patient wore a cardiac event monitor from 07/15/2022 to 07/17/2022 which demonstrated predominately SR with an average HR of 77 (). PAC burden 0%, PVC burden 22%. He was started on Metoprolol 50 mg by his primary care physician. Office Visit (Dr. Rhina Velasco, Interventional cardiology, 07/13/2022)  Eve Claudio is a 43 y.o. male with a history of obesity, type 2 DM, hepatic steatosis, and tobacco use referred for further evaluation of frequent PVCs. The patient was noted to have frequent PVCs in a pattern of ventricular bigeminy on an EKG obtained on 6/6/22 during a clinic visit where he was diagnosed with acute bacterial sinusitis. At that time, he was prescribed a 24-hour Holter monitor which showed a 24.7% PVC burden. He was subsequently started on metoprolol succinate 50 mg daily which he says he has been taking for about one month. In general, the patient reports that he has been feeling well and says that he never notices the PVCs. He says that he occasionally has a sensation like he can't take a deep breath and has some occasional mild, non-limiting lightheadedness, while at work. He denies any chest pain, palpitations, syncope, lower extremity, or orthopnea. His wife says that he snores and he states that he sometimes wakes up with headaches. He works third shift and has an irregular sleep schedule. He has never been formally evaluated for sleep apnea. He previously smoked 1 pack of cigarettes for 15 years and quit 5-6 years ago.   He denies any history of heavy alcohol consumption or the 120's at home. He reports that he does snore at night. He states he has an appointment tomorrow with the pulmonologist to evaluate for sleep apnea. He states he drinks one or two caffeine drinks a day. He reports he is a former smoker. He reports he is able to exercise with not shortness of breath or chest pain. He states he works nightshift as a . Patient denies current edema, chest pain, sob, palpitations, dizziness or syncope. Patient is taking all cardiac medications as prescribed and tolerates them well. Denies any recent issues with bleeding or bruising. No side effects reported from taking metoprolol. No alcohol use reported. No family history of sudden cardiac death or significant cardiac diseases. The patient had an echocardiogram on 08/03/2022 that demonstrated an EF of 55%. Frequent PVC's were noted during the study. Patient wore a cardiac event monitor from 08/26/2022 to 08/27/2022 which demonstrated predominately SR with an average HR of 81 (). PAC burden <0.01%, PVC burden 20.45%. Office Visit ([address], 09/02/2022)  Today he presents with his wife for follow up of PVC's after wearing a 24 hours Holter monitor. He reports he feels well and OK overall. His wife reports that their son has a congenital heart defect which causes them to worry about having further heart issues in the family. Patient states he is having an at home sleep study in [place]. 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 Patient denies current edema, chest pain, shortness of breath, palpitations, dizziness or syncope. He is taking all cardiac medications as prescribed and tolerates them well. He denies any recent issues with bleeding or bruising. Review of Systems  Review of Systems   Constitutional: Negative for malaise/fatigue, weight gain and weight loss. HENT:  Negative for nosebleeds and stridor.     Eyes:  Negative for vision loss in left eye and vision loss in right eye. Cardiovascular:  Negative for chest pain, dyspnea on exertion, leg swelling and syncope. Respiratory:  Positive for snoring. Negative for shortness of breath and wheezing. Hematologic/Lymphatic: Negative for bleeding problem. Does not bruise/bleed easily. Skin:  Negative for itching and rash. Musculoskeletal:  Negative for joint pain and joint swelling. Gastrointestinal:  Negative for hematemesis and hematochezia. Genitourinary:  Negative for dysuria and hematuria. Neurological:  Negative for dizziness and light-headedness. Psychiatric/Behavioral:  Negative for altered mental status. The patient is not nervous/anxious. Past Medical History:   Diagnosis Date    Kidney stones          Social History     Socioeconomic History    Marital status:      Spouse name: Not on file    Number of children: Not on file    Years of education: Not on file    Highest education level: Not on file   Occupational History    Not on file   Tobacco Use    Smoking status: Former     Packs/day: 0.00     Types: Cigarettes     Quit date: 2015     Years since quittin.9    Smokeless tobacco: Never   Vaping Use    Vaping Use: Former    Start date: 2015   Substance and Sexual Activity    Alcohol use: Yes     Alcohol/week: 1.0 - 3.0 standard drink     Types: 1 - 3 Cans of beer per week     Comment: occ    Drug use: No    Sexual activity: Yes     Partners: Female   Other Topics Concern    Not on file   Social History Narrative    Not on file     Social Determinants of Health     Financial Resource Strain: Low Risk     Difficulty of Paying Living Expenses: Not hard at all   Food Insecurity: No Food Insecurity    Worried About [address] [employer] in the Last Year: Never true    [address]  in the Last Year: Never true   Transportation Needs: No Transportation Needs    Lack of Transportation (Medical): No    Lack of Transportation (Non-Medical):  No   Physical Activity: Not on file Stress: Not on file   Social Connections: Not on file   Intimate Partner Violence: Not on file   Housing Stability: Low Risk     Unable to Pay for Housing in the Last Year: No    Number of Places Lived in the Last Year: 0    Unstable Housing in the Last Year: No         Family History   Problem Relation Age of Onset    Diabetes Other     Diabetes Father     Thyroid Disease Mother     No Known Problems Brother     No Known Problems Brother     No Known Problems Brother          Objective:     /88   Pulse 67   Ht 5' 7\" (1.702 m)   Wt 219 lb 9.6 oz (99.6 kg)   SpO2 97%   BMI 34.39 kg/m²     Physical Exam  Constitutional:       Appearance: Normal appearance. HENT:      Head: Normocephalic and atraumatic. Nose: Nose normal. No rhinorrhea. Eyes:      General: No scleral icterus. Conjunctiva/sclera: Conjunctivae normal.   Cardiovascular:      Rate and Rhythm: Normal rate. Rhythm irregular. Pulmonary:      Effort: Pulmonary effort is normal.      Breath sounds: Normal breath sounds. Abdominal:      General: There is no distension. Musculoskeletal:         General: Normal range of motion. Cervical back: Normal range of motion and neck supple. Skin:     General: Skin is warm and dry. Neurological:      General: No focal deficit present. Mental Status: He is alert and oriented to person, place, and time. Psychiatric:         Mood and Affect: Mood normal.         Behavior: Behavior normal.       ECG Interpretation: (Date: 08/02/2022)  Rhythm: Sinus Rhythm  Rate: 86 BPM  PAC's / PVC's present: Yes              ECG Interpretation:  (Date: 07/13/2022)  Rhythm: Sinus rhythm  Rate: 68 BPM  PAC's / PVC's present: Yes  Conduction abnormalities: None      Echocardiogram  (Date: 08/03/2022)   Summary   Frequent PVC's noted throughout the study. Normal left ventricular size and wall thickness. Left ventricular systolic function is normal with ejection fraction   estimated at 55%.    No

## 2022-09-02 NOTE — PATIENT INSTRUCTIONS
Plan:     1. Stop taking Metoprolol   2. Discussed the risks and benefits of a PVC ablation   - We will defer at this time  3. Start Diltiazem 180 mg daily.   4. Follow up with me 2-3 months

## 2022-09-16 DIAGNOSIS — I49.3 PVC (PREMATURE VENTRICULAR CONTRACTION): ICD-10-CM

## 2022-10-11 NOTE — TELEPHONE ENCOUNTER
Received a call from [facility] authorized/approved for the dates of 10/11/22 - 12/9/22. Auth # [id number].

## 2022-10-17 ENCOUNTER — HOSPITAL ENCOUNTER (OUTPATIENT)
Dept: SLEEP CENTER | Age: 42
Discharge: HOME | End: 2022-10-17
Payer: COMMERCIAL

## 2022-10-17 DIAGNOSIS — E11.9 TYPE 2 DIABETES MELLITUS WITHOUT COMPLICATION, WITHOUT LONG-TERM CURRENT USE OF INSULIN (HCC): ICD-10-CM

## 2022-10-17 DIAGNOSIS — R51.9 MORNING HEADACHE: ICD-10-CM

## 2022-10-17 DIAGNOSIS — R06.83 SNORING: ICD-10-CM

## 2022-10-17 DIAGNOSIS — I49.9 CARDIAC ARRHYTHMIA, UNSPECIFIED CARDIAC ARRHYTHMIA TYPE: ICD-10-CM

## 2022-10-17 DIAGNOSIS — K76.0 HEPATIC STEATOSIS: ICD-10-CM

## 2022-10-17 DIAGNOSIS — R42 DIZZINESS, NONSPECIFIC: ICD-10-CM

## 2022-10-17 PROCEDURE — 95806 SLEEP STUDY UNATT&RESP EFFT: CPT

## 2022-10-18 PROBLEM — R51.9 MORNING HEADACHE: Status: ACTIVE | Noted: 2022-10-18

## 2022-10-18 PROBLEM — R42 DIZZINESS, NONSPECIFIC: Status: ACTIVE | Noted: 2022-10-18

## 2022-10-18 PROBLEM — R06.83 SNORING: Status: ACTIVE | Noted: 2022-10-18

## 2022-10-18 PROBLEM — I49.9 CARDIAC ARRHYTHMIA: Status: ACTIVE | Noted: 2022-10-18

## 2022-10-18 PROCEDURE — 95806 SLEEP STUDY UNATT&RESP EFFT: CPT | Performed by: INTERNAL MEDICINE

## 2022-10-19 ENCOUNTER — TELEPHONE (OUTPATIENT)
Dept: PULMONOLOGY | Age: 42
End: 2022-10-19

## 2022-10-19 DIAGNOSIS — G47.33 OSA (OBSTRUCTIVE SLEEP APNEA): ICD-10-CM

## 2022-10-19 DIAGNOSIS — E11.9 TYPE 2 DIABETES MELLITUS WITHOUT COMPLICATION, WITHOUT LONG-TERM CURRENT USE OF INSULIN (HCC): ICD-10-CM

## 2022-10-19 DIAGNOSIS — R06.83 SNORING: Primary | ICD-10-CM

## 2022-10-19 DIAGNOSIS — I49.9 CARDIAC ARRHYTHMIA, UNSPECIFIED CARDIAC ARRHYTHMIA TYPE: ICD-10-CM

## 2022-10-19 DIAGNOSIS — R42 DIZZINESS, NONSPECIFIC: ICD-10-CM

## 2022-11-04 ENCOUNTER — OFFICE VISIT (OUTPATIENT)
Dept: CARDIOLOGY CLINIC | Age: 42
End: 2022-11-04
Payer: COMMERCIAL

## 2022-11-04 VITALS
SYSTOLIC BLOOD PRESSURE: 106 MMHG | WEIGHT: 225.5 LBS | DIASTOLIC BLOOD PRESSURE: 72 MMHG | HEART RATE: 52 BPM | HEIGHT: 67 IN | OXYGEN SATURATION: 96 % | BODY MASS INDEX: 35.39 KG/M2

## 2022-11-04 DIAGNOSIS — I49.9 CARDIAC ARRHYTHMIA, UNSPECIFIED CARDIAC ARRHYTHMIA TYPE: ICD-10-CM

## 2022-11-04 DIAGNOSIS — I49.3 PVC (PREMATURE VENTRICULAR CONTRACTION): Primary | ICD-10-CM

## 2022-11-04 DIAGNOSIS — G47.33 OSA (OBSTRUCTIVE SLEEP APNEA): ICD-10-CM

## 2022-11-04 PROCEDURE — 93000 ELECTROCARDIOGRAM COMPLETE: CPT | Performed by: INTERNAL MEDICINE

## 2022-11-04 PROCEDURE — 99214 OFFICE O/P EST MOD 30 MIN: CPT | Performed by: INTERNAL MEDICINE

## 2022-11-04 ASSESSMENT — ENCOUNTER SYMPTOMS
STRIDOR: 0
LEFT EYE: 0
RIGHT EYE: 0
SHORTNESS OF BREATH: 0
WHEEZING: 0
HEMATEMESIS: 0
HEMATOCHEZIA: 0
SNORING: 1

## 2022-11-04 NOTE — PROGRESS NOTES
Assessment:     1. Frequent PVC's: Patient presented with an elevated PVC count of approximately 21% of total beats. He has been essentially asymptomatic. He was on metoprolol sustained-release for a few weeks. His ECG initial visit showed continuing significant burden of PVCs. The morphology of the PVC is left bundle branch block pattern with a left inferior axis and precordial transition between V2 and V3. This is highly suggestive of an outflow tract origin possibly from the RVOT but cannot exclude the possibility of originating from the LVOT or coronary cusp area. An echocardiogram showed normal overall cardiac structure and function. Repeat 24 hour Holter monitor on metoprolol showed no significant improvement (PVC count 20.5%). after evaluation last time, we switched him from metoprolol to diltiazem  mg daily. He appears to have significantly fewer PVC's on exam and ECG today. Furthermore, he was recently found to have significant [facility] on a HST (9.7 AHI). Obstructive sleep apnea can often lead to ventricular and atrial arrhythmias including PVCs. I recommended to the patient regular exercise and weight loss as this may help his condition. He is scheduled for CPAP titration study soon. Will see how he does with that and repeat 48 Holter monitor in 3 months. No change in management at this time. He feels fine. 2. Good blood pressure control    3. Suspected obstructive sleep apnea: Referred for sleep study and scheduled for sleep study in October. 4. Diabetes mellitus: diagnosed in May 2021. Reports good overall blood glucose control at home. Plan:     1. Continue current cardiac medication as prescribed  2. Complete sleep study in the sleep medicine clinic   3. 48 hour cardiac event monitor in 3 months to reassess PVC burden  4. Follow up with me in 4 months     Subjective:       Patient ID: Darrian Tai is a 43 y.o. male.       Chief Complaint:  Chief Complaint   Patient presents with    Follow-up    Irregular Heart Beat     PVC's       HPI    Patient is a pleasant 43 y.o. male who presents for evaluation of frequent premature ventricular contractions. The patient has a past medical history of obesity, type 2 DM, hepatic steatosis, and frequent PVC's. The patient was noted to have an EKG demonstrating frequent ventricular bigeminy on 06/06/2022 during an office visit where he was diagnosed with an acute sinus infection. Patient wore a cardiac event monitor from 07/15/2022 to 07/17/2022 which demonstrated predominately SR with an average HR of 77 (). PAC burden 0%, PVC burden 22%. He was started on Metoprolol 50 mg by his primary care physician. Office Visit (Dr. Gabriel Green, Interventional cardiology, 07/13/2022)  David Waters is a 43 y.o. male with a history of obesity, type 2 DM, hepatic steatosis, and tobacco use referred for further evaluation of frequent PVCs. The patient was noted to have frequent PVCs in a pattern of ventricular bigeminy on an EKG obtained on 6/6/22 during a clinic visit where he was diagnosed with acute bacterial sinusitis. At that time, he was prescribed a 24-hour Holter monitor which showed a 24.7% PVC burden. He was subsequently started on metoprolol succinate 50 mg daily which he says he has been taking for about one month. In general, the patient reports that he has been feeling well and says that he never notices the PVCs. He says that he occasionally has a sensation like he can't take a deep breath and has some occasional mild, non-limiting lightheadedness, while at work. He denies any chest pain, palpitations, syncope, lower extremity, or orthopnea. His wife says that he snores and he states that he sometimes wakes up with headaches. He works third shift and has an irregular sleep schedule. He has never been formally evaluated for sleep apnea. He previously smoked 1 pack of cigarettes for 15 years and quit 5-6 years ago.   He denies any history of heavy alcohol consumption or recreational drug use. His son has Down's syndrome and tetralogy of Fallot and has had two open heart surgeries. He is unaware of any other heart disease in his family. and suspected sleep apnea. He saw his PCP 6/6/2022 and had an EKG which showed NSR with bigmeny PVC's noted. He wore a 24-hour holter monitor which showed underlying NSR with frequent PVC's and ventricular couplets. Today, 7/13/2022, patient reports feeling overall well. He was started on metoprolol for PVC's by his PCP, and has been taking for about 1 month. He has been tolerating this well. He states he never notices his PVC's, they were found during a PCP office visit. He states occasionally when he goes to take a deep breath he doesn't feel like he can get a full breath. He has had random occasional episodes of dizziness while he is at work, states this happens when he has a sinus infection sometimes. He states his wife tells him that he snores. He states he occasionally will wake up with headaches, states his sleep schedule is off due to working third shift. He has never been evaluated for sleep apnea. He is unaware of any heart disease in his family. He states his son has Down Syndrome and had two open heart surgeries for tetralgoy of fallot. He is a previous smoker, quit 5-6 years ago. Smoked for about 15 years, roughly 1PPD. He states he drinks caffeine but has cut back on this. He denies heavy alcohol use consistently, will drink socially. Denies any illicit drug use. Denies any fevers, chills, nausea, or diarrhea. Office Visit (EP clinic, 08/02/2022)  Patient presents to EP clinic today for assessment of his frequent ventricular arrhythmia. He is feeling well overall. He states his echocardiogram is scheduled for tomorrow. He reports that he does not feel the PVC's.  He states that in May of 2021 he was diagnosed with diabetes and he was then found to have PVC's on and EKG. He reports that his blood sugar remains around the 120's at home. He reports that he does snore at night. He states he has an appointment tomorrow with the pulmonologist to evaluate for sleep apnea. He states he drinks one or two caffeine drinks a day. He reports he is a former smoker. He reports he is able to exercise with not shortness of breath or chest pain. He states he works nightshift as a . Patient denies current edema, chest pain, sob, palpitations, dizziness or syncope. Patient is taking all cardiac medications as prescribed and tolerates them well. Denies any recent issues with bleeding or bruising. No side effects reported from taking metoprolol. No alcohol use reported. No family history of sudden cardiac death or significant cardiac diseases. The patient had an echocardiogram on 08/03/2022 that demonstrated an EF of 55%. Frequent PVC's were noted during the study. Patient wore a cardiac event monitor from 08/26/2022 to 08/27/2022 which demonstrated predominately SR with an average HR of 81 (). PAC burden <0.01%, PVC burden 20.45%. Office Visit ([address], 09/02/2022)  Today he presents with his wife for follow up of PVC's after wearing a 24 hours Holter monitor. He reports he feels well and OK overall. His wife reports that their son has a congenital heart defect which causes them to worry about having further heart issues in the family. Patient states he is having an at home sleep study in [place]. 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 Patient denies current edema, chest pain, shortness of breath, palpitations, dizziness or syncope. He is taking all cardiac medications as prescribed and tolerates them well. He denies any recent issues with bleeding or bruising. Interval History since last office visit: The patient underwent a sleep study at home on 10/17/2022 that demonstrated mild obstructive sleep apnea.      Office Visit (EP Clinic, 2022)  Today he presents with his wife for follow up. He states that he completed an at home sleep study. He states that he has to have a test completed in the sleep medicine clinic this month. He states that he is not experiencing fatigue. He states that when he is weighing himself at home, he has lost a few pounds. Patient denies current edema, chest pain, shortness of breath, palpitations, dizziness or syncope. Patient is taking all cardiac medications as prescribed and tolerates them well. He denies any recent issues with bleeding or bruising. Review of Systems  Review of Systems   Constitutional: Positive for weight loss. Negative for malaise/fatigue and weight gain. HENT:  Negative for nosebleeds and stridor. Eyes:  Negative for vision loss in left eye and vision loss in right eye. Cardiovascular:  Negative for chest pain, dyspnea on exertion, leg swelling and syncope. Respiratory:  Positive for snoring. Negative for shortness of breath and wheezing. Hematologic/Lymphatic: Negative for bleeding problem. Does not bruise/bleed easily. Skin:  Negative for itching and rash. Musculoskeletal:  Negative for joint pain and joint swelling. Gastrointestinal:  Negative for hematemesis and hematochezia. Genitourinary:  Negative for dysuria and hematuria. Neurological:  Negative for dizziness and light-headedness. Psychiatric/Behavioral:  Negative for altered mental status. The patient is not nervous/anxious.         Past Medical History:   Diagnosis Date    Kidney stones          Social History     Socioeconomic History    Marital status:      Spouse name: Not on file    Number of children: Not on file    Years of education: Not on file    Highest education level: Not on file   Occupational History    Not on file   Tobacco Use    Smoking status: Former     Packs/day: 0.00     Types: Cigarettes     Quit date: 2015     Years since quittin.1    Smokeless tobacco: Never Vaping Use    Vaping Use: Former    Start date: 9/1/2015   Substance and Sexual Activity    Alcohol use: Yes     Alcohol/week: 1.0 - 3.0 standard drink     Types: 1 - 3 Cans of beer per week     Comment: occ    Drug use: No    Sexual activity: Yes     Partners: Female   Other Topics Concern    Not on file   Social History Narrative    Not on file     Social Determinants of Health     Financial Resource Strain: Low Risk     Difficulty of Paying Living Expenses: Not hard at all   Food Insecurity: No Food Insecurity    Worried About [address] in the Last Year: Never true    [address] [employer] in the Last Year: Never true   Transportation Needs: No Transportation Needs    Lack of Transportation (Medical): No    Lack of Transportation (Non-Medical): No   Physical Activity: Not on file   Stress: Not on file   Social Connections: Not on file   Intimate Partner Violence: Not on file   Housing Stability: Low Risk     Unable to Pay for Housing in the Last Year: No    Number of Places Lived in the Last Year: 0    Unstable Housing in the Last Year: No         Family History   Problem Relation Age of Onset    Diabetes Other     Diabetes Father     Thyroid Disease Mother     No Known Problems Brother     No Known Problems Brother     No Known Problems Brother          Objective:     /72   Pulse 52   Ht 5' 7\" (1.702 m)   Wt 225 lb 8 oz (102.3 kg)   SpO2 96%   BMI 35.32 kg/m²     Physical Exam  Constitutional:       Appearance: Normal appearance. HENT:      Head: Normocephalic and atraumatic. Nose: Nose normal. No rhinorrhea. Eyes:      General: No scleral icterus. Conjunctiva/sclera: Conjunctivae normal.   Cardiovascular:      Rate and Rhythm: Normal rate and regular rhythm. Occasional Extrasystoles are present. Pulmonary:      Effort: Pulmonary effort is normal.      Breath sounds: Normal breath sounds. Abdominal:      General: There is no distension.    Musculoskeletal:         General: Normal range of motion. Cervical back: Normal range of motion and neck supple. Skin:     General: Skin is warm and dry. Neurological:      General: No focal deficit present. Mental Status: He is alert and oriented to person, place, and time. Psychiatric:         Mood and Affect: Mood normal.         Behavior: Behavior normal.     ECG Interpretation:  (Date: 11/04/2022)  Rhythm: Sinus rhythm  Rate: 60 BPM  PAC's / PVC's present: None        ECG Interpretation: (Date: 08/02/2022)  Rhythm: Sinus Rhythm  Rate: 86 BPM  PAC's / PVC's present: Yes              ECG Interpretation:  (Date: 07/13/2022)  Rhythm: Sinus rhythm  Rate: 68 BPM  PAC's / PVC's present: Yes  Conduction abnormalities: None      Echocardiogram  (Date: 08/03/2022)   Summary   Frequent PVC's noted throughout the study. Normal left ventricular size and wall thickness. Left ventricular systolic function is normal with ejection fraction   estimated at 55%. No regional wall motion abnormalities. Normal left ventricular diastolic filling pressure. The right ventricle is normal in size and function. No significant valvular disease noted. Inadequate tricuspid regurgitation jet to estimate systolic artery pressure   (SPAP). Stress Test   N/A      Current Medications     Current Outpatient Medications   Medication Sig Dispense Refill    dilTIAZem (CARDIZEM CD) 180 MG extended release capsule Take 1 capsule by mouth daily 90 capsule 1    lisinopril (PRINIVIL;ZESTRIL) 5 MG tablet TAKE 1/2 TABLET BY MOUTH DAILY 45 tablet 1    atorvastatin (LIPITOR) 40 MG tablet Take 1 tablet by mouth daily 90 tablet 1    metFORMIN (GLUCOPHAGE-XR) 500 MG extended release tablet Take 2 tablets by mouth daily (with breakfast) 180 tablet 1    Blood Glucose Monitoring Suppl (FREESTYLE LITE) ROSA 1 Device by Does not apply route daily 1 Device 0    blood glucose monitor strips Test one times a day & as needed for symptoms of irregular blood glucose.  100 strip 1 Lancets MISC 1 each by Does not apply route daily 100 each 3    Alcohol Swabs (ALCOHOL PREP) PADS 1 Units by Does not apply route daily 90 each 1     No current facility-administered medications for this visit. Lab Review     Lab Results   Component Value Date/Time     07/13/2022 03:09 PM    K 4.6 07/13/2022 03:09 PM     07/13/2022 03:09 PM    CO2 22 07/13/2022 03:09 PM    BUN 14 07/13/2022 03:09 PM    CREATININE 0.9 07/13/2022 03:09 PM    GLUCOSE 127 07/13/2022 03:09 PM    CALCIUM 9.6 07/13/2022 03:09 PM        Lab Results   Component Value Date    WBC 8.7 07/13/2022    HGB 15.6 07/13/2022    HCT 45.5 07/13/2022    MCV 84.5 07/13/2022     07/13/2022       Lab Results   Component Value Date    TSHFT4 2.23 07/13/2022       No results found for: BNP    I, Yayo Peres, PEDRO, am scribing for and in the presence of Dr. Cammy Batres.  11/04/22 8:53 AM   Yayo Peres RN

## 2022-11-04 NOTE — PATIENT INSTRUCTIONS
Plan:     1. Continue current cardiac medication as prescribed  2. Complete sleep study in the sleep medicine clinic   3. Cardiac event monitor in 3 months to reassess PVC burden  4.  Follow up with me in 4 months

## 2022-11-16 ENCOUNTER — HOSPITAL ENCOUNTER (OUTPATIENT)
Dept: SLEEP CENTER | Age: 42
Discharge: HOME | End: 2022-11-16
Payer: COMMERCIAL

## 2022-11-16 DIAGNOSIS — E11.9 TYPE 2 DIABETES MELLITUS WITHOUT COMPLICATION, WITHOUT LONG-TERM CURRENT USE OF INSULIN (HCC): ICD-10-CM

## 2022-11-16 DIAGNOSIS — I49.9 CARDIAC ARRHYTHMIA, UNSPECIFIED CARDIAC ARRHYTHMIA TYPE: ICD-10-CM

## 2022-11-16 DIAGNOSIS — R42 DIZZINESS, NONSPECIFIC: ICD-10-CM

## 2022-11-16 DIAGNOSIS — G47.33 OSA (OBSTRUCTIVE SLEEP APNEA): ICD-10-CM

## 2022-11-16 PROCEDURE — 95811 POLYSOM 6/>YRS CPAP 4/> PARM: CPT

## 2022-11-16 PROCEDURE — 95811 POLYSOM 6/>YRS CPAP 4/> PARM: CPT | Performed by: INTERNAL MEDICINE

## 2022-11-17 ENCOUNTER — TELEPHONE (OUTPATIENT)
Dept: PULMONOLOGY | Age: 42
End: 2022-11-17

## 2022-11-17 DIAGNOSIS — G47.33 OSA (OBSTRUCTIVE SLEEP APNEA): Primary | ICD-10-CM

## 2022-12-01 ENCOUNTER — TELEPHONE (OUTPATIENT)
Dept: PULMONOLOGY | Age: 42
End: 2022-12-01

## 2022-12-01 NOTE — TELEPHONE ENCOUNTER
Received call from ALEENA Sierra 88 @ [facility] who stated CPAP has been approved with [employer].  Auth# is [id number] Authorization is valid fro 11/30/2022---02/27/2023

## 2022-12-02 ENCOUNTER — TELEPHONE (OUTPATIENT)
Dept: CARDIOLOGY CLINIC | Age: 42
End: 2022-12-02

## 2022-12-02 ENCOUNTER — NURSE ONLY (OUTPATIENT)
Dept: PRIMARY CARE CLINIC | Age: 42
End: 2022-12-02
Payer: COMMERCIAL

## 2022-12-02 DIAGNOSIS — E11.9 TYPE 2 DIABETES MELLITUS WITHOUT COMPLICATION, WITHOUT LONG-TERM CURRENT USE OF INSULIN (HCC): Primary | ICD-10-CM

## 2022-12-02 DIAGNOSIS — E11.9 TYPE 2 DIABETES MELLITUS WITHOUT COMPLICATION, WITHOUT LONG-TERM CURRENT USE OF INSULIN (HCC): ICD-10-CM

## 2022-12-02 PROCEDURE — 36415 COLL VENOUS BLD VENIPUNCTURE: CPT

## 2022-12-02 NOTE — LETTER
[facility] - [address] [place] [address] [facility]  Phone: [phone]  Fax: [phone]    Lesta Lundborg, MD        December 7, 2022    Onyoli Margot   1980  [address] [place] [address]      To whom it may concern:     Onita Braddock is cleared from a cardiac / electrophysiology standpoint to return to work with no restrictions. If you have any questions or concerns, please don't hesitate to call.     Sincerely,        Lesta Lundborg, MD

## 2022-12-02 NOTE — TELEPHONE ENCOUNTER
Received call from Debbi Gibbs @ [employer] who called with PA information. Raelyn Saint know I received a call yesterday from [employer] giving me PA information. Debbi Gibbs asked for the PA# to ensure it is the same information. When I provided Munday Latin with PA information he stated this did not match his records. PA # Debbi Gibbs provided is #[id number] Authorization dates are valid through 12/01/2022---02/28/2023 with Mariel. Per notes in pts charts pt wanted orders faxed to [employer], and they are working on them.  I asked Munday Latin if this would make PA through [employer] invalid, per Debbi Gibbs he said he \"wouldn't know, is supposing\"

## 2022-12-02 NOTE — TELEPHONE ENCOUNTER
Pt stated that he needs a letter from his job for clearance to work. Pts last ov 11/04/2022 kxa. Upcoming 03/08/2023.

## 2022-12-03 LAB
ESTIMATED AVERAGE GLUCOSE: 214.5 MG/DL
HBA1C MFR BLD: 9.1 %

## 2022-12-05 NOTE — PROGRESS NOTES
Patient came into the office per physician's request for the following blood test(s): A1C,    Blood drawn in office by DL    # of tubes sent: 1 LAV

## 2022-12-05 NOTE — TELEPHONE ENCOUNTER
Pt called to check on the status of his d.o.t ppw. Pt stated that he will come into the office to  letter. Please advise.

## 2022-12-07 ENCOUNTER — OFFICE VISIT (OUTPATIENT)
Dept: PRIMARY CARE CLINIC | Age: 42
End: 2022-12-07
Payer: COMMERCIAL

## 2022-12-07 VITALS
HEART RATE: 84 BPM | SYSTOLIC BLOOD PRESSURE: 100 MMHG | OXYGEN SATURATION: 97 % | BODY MASS INDEX: 33.99 KG/M2 | TEMPERATURE: 97.4 F | DIASTOLIC BLOOD PRESSURE: 64 MMHG | WEIGHT: 217 LBS

## 2022-12-07 DIAGNOSIS — Z23 FLU VACCINE NEED: ICD-10-CM

## 2022-12-07 DIAGNOSIS — E11.9 TYPE 2 DIABETES MELLITUS WITHOUT COMPLICATION, WITHOUT LONG-TERM CURRENT USE OF INSULIN (HCC): ICD-10-CM

## 2022-12-07 DIAGNOSIS — K76.0 HEPATIC STEATOSIS: ICD-10-CM

## 2022-12-07 DIAGNOSIS — Z76.89 ENCOUNTER TO ESTABLISH CARE: Primary | ICD-10-CM

## 2022-12-07 DIAGNOSIS — E78.2 MIXED HYPERLIPIDEMIA: ICD-10-CM

## 2022-12-07 PROBLEM — R51.9 MORNING HEADACHE: Status: RESOLVED | Noted: 2022-10-18 | Resolved: 2022-12-07

## 2022-12-07 PROBLEM — G44.319 ACUTE POST-TRAUMATIC HEADACHE, NOT INTRACTABLE: Status: RESOLVED | Noted: 2021-05-25 | Resolved: 2022-12-07

## 2022-12-07 PROBLEM — M62.838 MUSCLE SPASM OF RIGHT SHOULDER: Status: RESOLVED | Noted: 2021-05-25 | Resolved: 2022-12-07

## 2022-12-07 PROCEDURE — 99213 OFFICE O/P EST LOW 20 MIN: CPT

## 2022-12-07 PROCEDURE — 90471 IMMUNIZATION ADMIN: CPT

## 2022-12-07 PROCEDURE — 90674 CCIIV4 VAC NO PRSV 0.5 ML IM: CPT

## 2022-12-07 PROCEDURE — 3046F HEMOGLOBIN A1C LEVEL >9.0%: CPT

## 2022-12-07 RX ORDER — GLIPIZIDE 5 MG/1
5 TABLET ORAL DAILY
Qty: 60 TABLET | Refills: 3 | Status: SHIPPED | OUTPATIENT
Start: 2022-12-07

## 2022-12-07 RX ORDER — METFORMIN HYDROCHLORIDE 500 MG/1
1000 TABLET, EXTENDED RELEASE ORAL 2 TIMES DAILY
Qty: 360 TABLET | Refills: 1 | Status: SHIPPED | OUTPATIENT
Start: 2022-12-07

## 2022-12-07 SDOH — HEALTH STABILITY: PHYSICAL HEALTH: ON AVERAGE, HOW MANY DAYS PER WEEK DO YOU ENGAGE IN MODERATE TO STRENUOUS EXERCISE (LIKE A BRISK WALK)?: 3 DAYS

## 2022-12-07 ASSESSMENT — SOCIAL DETERMINANTS OF HEALTH (SDOH)
WITHIN THE LAST YEAR, HAVE TO BEEN RAPED OR FORCED TO HAVE ANY KIND OF SEXUAL ACTIVITY BY YOUR PARTNER OR EX-PARTNER?: NO
WITHIN THE LAST YEAR, HAVE YOU BEEN KICKED, HIT, SLAPPED, OR OTHERWISE PHYSICALLY HURT BY YOUR PARTNER OR EX-PARTNER?: NO
WITHIN THE LAST YEAR, HAVE YOU BEEN AFRAID OF YOUR PARTNER OR EX-PARTNER?: NO
WITHIN THE LAST YEAR, HAVE YOU BEEN HUMILIATED OR EMOTIONALLY ABUSED IN OTHER WAYS BY YOUR PARTNER OR EX-PARTNER?: NO

## 2022-12-07 ASSESSMENT — ENCOUNTER SYMPTOMS
COUGH: 0
GASTROINTESTINAL NEGATIVE: 1
APNEA: 1
CHEST TIGHTNESS: 0
SHORTNESS OF BREATH: 0

## 2022-12-07 NOTE — PATIENT INSTRUCTIONS
Goal for fasting blood sugar is <130 mg/dL  Goal for post prandial (2 hours after the largest meal of the day) is <180 mg/dL

## 2022-12-07 NOTE — TELEPHONE ENCOUNTER
Per [facility]:     [id number] to provide paperwork. No restrictions from cardiac electrophysiology standpoint. Thank you     Spoke to pt to relay message.  V/U     Letter created and shared with pt via [employer] (per pt request)
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
## 2022-12-08 LAB
ALBUMIN SERPL-MCNC: 4.9 G/DL (ref 3.4–5)
ALP BLD-CCNC: 87 U/L (ref 40–129)
ALT SERPL-CCNC: 35 U/L (ref 10–40)
AST SERPL-CCNC: 17 U/L (ref 15–37)
BILIRUB SERPL-MCNC: 0.6 MG/DL (ref 0–1)
BILIRUBIN DIRECT: <0.2 MG/DL (ref 0–0.3)
BILIRUBIN, INDIRECT: NORMAL MG/DL (ref 0–1)
CHOLESTEROL, TOTAL: 120 MG/DL (ref 0–199)
CREATININE URINE: 126.9 MG/DL (ref 39–259)
HDLC SERPL-MCNC: 29 MG/DL (ref 40–60)
LDL CHOLESTEROL CALCULATED: 68 MG/DL
MICROALBUMIN UR-MCNC: 2 MG/DL
MICROALBUMIN/CREAT UR-RTO: 15.8 MG/G (ref 0–30)
TOTAL PROTEIN: 6.8 G/DL (ref 6.4–8.2)
TRIGL SERPL-MCNC: 114 MG/DL (ref 0–150)
VLDLC SERPL CALC-MCNC: 23 MG/DL

## 2022-12-12 ENCOUNTER — OFFICE VISIT (OUTPATIENT)
Dept: PRIMARY CARE CLINIC | Age: 42
End: 2022-12-12
Payer: COMMERCIAL

## 2022-12-12 VITALS
DIASTOLIC BLOOD PRESSURE: 60 MMHG | BODY MASS INDEX: 33.99 KG/M2 | HEART RATE: 66 BPM | SYSTOLIC BLOOD PRESSURE: 102 MMHG | OXYGEN SATURATION: 97 % | WEIGHT: 217 LBS | TEMPERATURE: 97.5 F

## 2022-12-12 DIAGNOSIS — J10.1 INFLUENZA A: Primary | ICD-10-CM

## 2022-12-12 DIAGNOSIS — R05.1 ACUTE COUGH: ICD-10-CM

## 2022-12-12 LAB
INFLUENZA A ANTIBODY: POSITIVE
INFLUENZA B ANTIBODY: NEGATIVE

## 2022-12-12 PROCEDURE — 87804 INFLUENZA ASSAY W/OPTIC: CPT

## 2022-12-12 PROCEDURE — 99213 OFFICE O/P EST LOW 20 MIN: CPT

## 2022-12-12 RX ORDER — BENZONATATE 100 MG/1
200 CAPSULE ORAL 3 TIMES DAILY PRN
Qty: 30 CAPSULE | Refills: 0 | Status: SHIPPED | OUTPATIENT
Start: 2022-12-12 | End: 2022-12-19

## 2022-12-12 RX ORDER — PREDNISONE 20 MG/1
20 TABLET ORAL 2 TIMES DAILY
Qty: 10 TABLET | Refills: 0 | Status: SHIPPED | OUTPATIENT
Start: 2022-12-12 | End: 2022-12-17

## 2022-12-12 ASSESSMENT — ENCOUNTER SYMPTOMS
WHEEZING: 0
SHORTNESS OF BREATH: 0
COUGH: 1
VOMITING: 1
CHEST TIGHTNESS: 1
SINUS PAIN: 0
NAUSEA: 1
SORE THROAT: 1
SINUS PRESSURE: 0

## 2022-12-12 NOTE — PROGRESS NOTES
[address] [address] [facility]  2022    Franklin Goodwin (:  1980) is a 43 y.o. male, here for evaluation of the following medical concerns:    Chief Complaint   Patient presents with    Cough    Fever        ASSESSMENT/ PLAN  1. Influenza A  - predniSONE (DELTASONE) 20 MG tablet; Take 1 tablet by mouth 2 times daily for 5 days  Dispense: 10 tablet; Refill: 0    2. Acute cough  - predniSONE (DELTASONE) 20 MG tablet; Take 1 tablet by mouth 2 times daily for 5 days  Dispense: 10 tablet; Refill: 0  - benzonatate (TESSALON PERLES) 100 MG capsule; Take 2 capsules by mouth 3 times daily as needed for Cough  Dispense: 30 capsule; Refill: 0     - Supportive care measures discussed. Increase fluids. He is out of the Tamiflu window. - Will treat with steroids as he tells me that he is prone to bronchitis. Return if symptoms worsen or fail to improve. HPI  Symptoms began Friday morning (3 days ago). He stayed home from work last night. He tells me that he is feeling a little bit better today. Family members have tested positive for Flu A    +cough; started to be productive this morning. Not sleeping well from the cough. +fever yesterday was 101. Afebrile so far today. +sore throat  Mild sinus congestion and ear pain. He threw up a few times over the weekend. Denies SOB or wheezing. Taking Mucinex and Tylenol as needed. He has not had any Tylenol yet today. ROS  Review of Systems   Constitutional:  Positive for activity change, appetite change, chills, fatigue and fever. HENT:  Positive for congestion and sore throat. Negative for ear discharge, ear pain, sinus pressure and sinus pain. Respiratory:  Positive for cough and chest tightness. Negative for shortness of breath and wheezing. Cardiovascular:  Negative for chest pain, palpitations and leg swelling. Gastrointestinal:  Positive for nausea and vomiting. Genitourinary:  Negative for decreased urine volume. Musculoskeletal:  Positive for myalgias. Neurological:  Positive for headaches. HISTORIES  Current Outpatient Medications on File Prior to Visit   Medication Sig Dispense Refill    metFORMIN (GLUCOPHAGE-XR) 500 MG extended release tablet Take 2 tablets by mouth 2 times daily 360 tablet 1    glipiZIDE (GLUCOTROL) 5 MG tablet Take 1 tablet by mouth daily 60 tablet 3    dilTIAZem (CARDIZEM CD) 180 MG extended release capsule Take 1 capsule by mouth daily 90 capsule 1    lisinopril (PRINIVIL;ZESTRIL) 5 MG tablet TAKE 1/2 TABLET BY MOUTH DAILY 45 tablet 1    atorvastatin (LIPITOR) 40 MG tablet Take 1 tablet by mouth daily 90 tablet 1    Blood Glucose Monitoring Suppl (FREESTYLE LITE) ROSA 1 Device by Does not apply route daily 1 Device 0    blood glucose monitor strips Test one times a day & as needed for symptoms of irregular blood glucose. 100 strip 1    Lancets MISC 1 each by Does not apply route daily 100 each 3    Alcohol Swabs (ALCOHOL PREP) PADS 1 Units by Does not apply route daily 90 each 1     No current facility-administered medications on file prior to visit.       Past Medical History:   Diagnosis Date    Hyperlipidemia     Kidney stones     Sleep apnea     Type 2 diabetes mellitus without complication [facility])      Patient Active Problem List   Diagnosis    Class 1 obesity without serious comorbidity with body mass index (BMI) of 33.0 to 33.9 in adult    Hepatic steatosis    Suspected sleep apnea    Type 2 diabetes mellitus without complication, without long-term current use of insulin (HCC)    PVC (premature ventricular contraction)    Personal history of tobacco use    Snoring    Cardiac arrhythmia    Dizziness, nonspecific    SINAI (obstructive sleep apnea)       PE  Vitals:    12/12/22 0924   BP: 102/60   Pulse: 66   Temp: 97.5 °F (36.4 °C)   TempSrc: Temporal   SpO2: 97%   Weight: 217 lb (98.4 kg)     Estimated body mass index is 33.99 kg/m² as calculated from the following:    Height as of 11/4/22: 5' 7\" (1.702 m). Weight as of this encounter: 217 lb (98.4 kg). Physical Exam  Vitals reviewed. Constitutional:       Appearance: Normal appearance. He is ill-appearing. HENT:      Head: Normocephalic. Right Ear: Tympanic membrane, ear canal and external ear normal.      Left Ear: Tympanic membrane, ear canal and external ear normal.      Nose: Mucosal edema, congestion and rhinorrhea present. Right Turbinates: Enlarged. Left Turbinates: Enlarged. Right Sinus: No maxillary sinus tenderness or frontal sinus tenderness. Left Sinus: No maxillary sinus tenderness or frontal sinus tenderness. Mouth/Throat:      Mouth: Mucous membranes are moist.      Pharynx: Oropharynx is clear. No oropharyngeal exudate or posterior oropharyngeal erythema. Eyes:      Conjunctiva/sclera: Conjunctivae normal.      Pupils: Pupils are equal, round, and reactive to light. Cardiovascular:      Rate and Rhythm: Normal rate. Rhythm irregular. Pulses: Normal pulses. Comments: Per baseline  Pulmonary:      Effort: Pulmonary effort is normal.      Breath sounds: Wheezing (slight exp wheeze) present. No rhonchi. Comments: Cough is deep and forceful  Abdominal:      General: Abdomen is flat. Bowel sounds are normal.      Palpations: Abdomen is soft. Musculoskeletal:         General: Normal range of motion. Cervical back: Normal range of motion and neck supple. Skin:     General: Skin is warm. Capillary Refill: Capillary refill takes less than 2 seconds. Neurological:      General: No focal deficit present. Mental Status: He is alert.    Psychiatric:         Mood and Affect: Mood normal.       Shahla Craft, APRN - CNP

## 2022-12-18 ENCOUNTER — OFFICE VISIT (OUTPATIENT)
Dept: URGENT CARE | Age: 42
End: 2022-12-18

## 2022-12-18 VITALS
HEIGHT: 67 IN | SYSTOLIC BLOOD PRESSURE: 128 MMHG | WEIGHT: 215 LBS | OXYGEN SATURATION: 94 % | TEMPERATURE: 100.4 F | DIASTOLIC BLOOD PRESSURE: 68 MMHG | BODY MASS INDEX: 33.74 KG/M2 | RESPIRATION RATE: 24 BRPM | HEART RATE: 110 BPM

## 2022-12-18 DIAGNOSIS — R03.0 ELEVATED BLOOD PRESSURE READING: ICD-10-CM

## 2022-12-18 DIAGNOSIS — J18.0 BRONCHIAL PNEUMONIA: Primary | ICD-10-CM

## 2022-12-18 RX ORDER — GUAIFENESIN AND DEXTROMETHORPHAN HYDROBROMIDE 1200; 60 MG/1; MG/1
1 TABLET, EXTENDED RELEASE ORAL EVERY 12 HOURS
Qty: 28 TABLET | COMMUNITY
Start: 2022-12-18

## 2022-12-18 RX ORDER — AZITHROMYCIN 250 MG/1
250 TABLET, FILM COATED ORAL SEE ADMIN INSTRUCTIONS
Qty: 6 TABLET | Refills: 0 | Status: SHIPPED | OUTPATIENT
Start: 2022-12-18 | End: 2022-12-23

## 2022-12-18 RX ORDER — ALBUTEROL SULFATE 90 UG/1
2 AEROSOL, METERED RESPIRATORY (INHALATION) EVERY 6 HOURS PRN
Qty: 18 G | Refills: 3 | Status: SHIPPED | OUTPATIENT
Start: 2022-12-18

## 2022-12-18 ASSESSMENT — ENCOUNTER SYMPTOMS
SINUS PAIN: 0
COUGH: 1
DIARRHEA: 0
SORE THROAT: 1
SINUS PRESSURE: 0
WHEEZING: 1
NAUSEA: 0
VOMITING: 0
CHEST TIGHTNESS: 1

## 2022-12-18 NOTE — PROGRESS NOTES
Tim Jimenez (:  1980) is a 43 y.o. male,New patient, here for evaluation of the following chief complaint(s):  Cough (Positive for Flu 2022, cough bad X 2 days)      ASSESSMENT/PLAN:    ICD-10-CM    1. Bronchial pneumonia  J18.0 azithromycin (ZITHROMAX) 250 MG tablet     Dextromethorphan-guaiFENesin (MUCINEX DM MAXIMUM STRENGTH)  MG TB12     albuterol sulfate HFA (PROVENTIL HFA) 108 (90 Base) MCG/ACT inhaler      2. Elevated blood pressure reading  R03.0         Continue home medications  Reviewed AVS with patient. All questions answered  Discussed elevated blood pressure. Instructed to monitor BP daily and follow up with PCP as needed. Return if symptoms worsen or fail to improve. SUBJECTIVE/OBJECTIVE:  43year old male presents with c/o persistent cough for 1 week. He tested positive for Influenza A on 22 - he was treated with prednisone due to wheezing at the time. He denies recent fever in the past 48 hours. He has been treating with cough drops and benzonatate- without effectiveness. He has also been treating with Vicks vaprub. He reports overall feeling bad and fatigued. He hs known exposure to his daughter with flu. History provided by:  Patient   used: No      Vitals:    22 1159   BP: 128/68   Site: Left Upper Arm   Position: Sitting   Cuff Size: Large Adult   Pulse: (!) 110   Resp: 24   Temp: 100.4 °F (38 °C)   SpO2: 94%   Weight: 215 lb (97.5 kg)   Height: 5' 7\" (1.702 m)       Review of Systems   Constitutional:  Positive for fatigue. Negative for activity change, appetite change and fever. HENT:  Positive for sore throat. Negative for congestion, ear pain, sinus pressure, sinus pain and sneezing. Respiratory:  Positive for cough, chest tightness and wheezing. Productive - mucoid yellow and green   Gastrointestinal:  Negative for diarrhea, nausea and vomiting. Neurological:  Positive for headaches.      Physical Exam  Constitutional:       General: He is not in acute distress. Appearance: He is ill-appearing. HENT:      Head: Normocephalic. Right Ear: Tympanic membrane, ear canal and external ear normal. There is no impacted cerumen. Left Ear: Tympanic membrane, ear canal and external ear normal. There is no impacted cerumen. Nose: Nose normal.      Mouth/Throat:      Mouth: Mucous membranes are moist.   Eyes:      General:         Right eye: No discharge. Left eye: No discharge. Pupils: Pupils are equal, round, and reactive to light. Cardiovascular:      Rate and Rhythm: Normal rate and regular rhythm. Heart sounds: No murmur heard. Pulmonary:      Effort: Pulmonary effort is normal. No respiratory distress. Breath sounds: Rhonchi and rales present. No wheezing. Comments: Congested non-productive cough on exam  Skin:     General: Skin is warm and dry. Neurological:      Mental Status: He is alert and oriented to person, place, and time. Psychiatric:         Behavior: Behavior normal.         An electronic signature was used to authenticate this note.     --Westley Or, APRN - CNP

## 2022-12-18 NOTE — LETTER
[facility]  [address] [place] [id number]  Phone: [phone]  Fax: [phone]    CAMERON Pena CNP        December 18, 2022     Patient: Tamara Lynn   YOB: 1980   Date of Visit: 12/18/2022       To Whom It May Concern: It is my medical opinion that Zoey Casas may return to work on 12/20/22 . If you have any questions or concerns, please don't hesitate to call.     Sincerely,          CAMERON Pena CNP

## 2022-12-19 ENCOUNTER — TELEPHONE (OUTPATIENT)
Dept: PRIMARY CARE CLINIC | Age: 42
End: 2022-12-19

## 2022-12-19 NOTE — TELEPHONE ENCOUNTER
Patient did go to the Urgent care and they are treating him for pneumonia. They put him on azithromycin and a cough medication. Also told him to take Mucinex. Told him to call back if anything changes.

## 2022-12-19 NOTE — TELEPHONE ENCOUNTER
Wife of pt called on call provider, reporting her  recently tested positive for the flu, feels he is improving but has great concern for ongoing cough pt is prone to Bronchitis/pneumonia, advised to f/u at the [facility].

## 2023-01-10 ENCOUNTER — TELEPHONE (OUTPATIENT)
Dept: PULMONOLOGY | Age: 43
End: 2023-01-10

## 2023-01-10 ENCOUNTER — OFFICE VISIT (OUTPATIENT)
Dept: PULMONOLOGY | Age: 43
End: 2023-01-10
Payer: COMMERCIAL

## 2023-01-10 VITALS
DIASTOLIC BLOOD PRESSURE: 72 MMHG | HEART RATE: 88 BPM | RESPIRATION RATE: 16 BRPM | HEIGHT: 67 IN | BODY MASS INDEX: 33.27 KG/M2 | WEIGHT: 212 LBS | SYSTOLIC BLOOD PRESSURE: 118 MMHG | OXYGEN SATURATION: 97 %

## 2023-01-10 DIAGNOSIS — G47.33 OSA (OBSTRUCTIVE SLEEP APNEA): Primary | ICD-10-CM

## 2023-01-10 PROCEDURE — 99214 OFFICE O/P EST MOD 30 MIN: CPT

## 2023-01-10 RX ORDER — METOPROLOL SUCCINATE 25 MG/1
TABLET, EXTENDED RELEASE ORAL
COMMUNITY
Start: 2022-10-31

## 2023-01-10 ASSESSMENT — SLEEP AND FATIGUE QUESTIONNAIRES
NECK CIRCUMFERENCE (INCHES): 17
HOW LIKELY ARE YOU TO NOD OFF OR FALL ASLEEP WHILE WATCHING TV: 0
HOW LIKELY ARE YOU TO NOD OFF OR FALL ASLEEP WHILE SITTING INACTIVE IN A PUBLIC PLACE: 0
HOW LIKELY ARE YOU TO NOD OFF OR FALL ASLEEP WHILE SITTING AND READING: 0
ESS TOTAL SCORE: 4
HOW LIKELY ARE YOU TO NOD OFF OR FALL ASLEEP IN A CAR, WHILE STOPPED FOR A FEW MINUTES IN TRAFFIC: 0
HOW LIKELY ARE YOU TO NOD OFF OR FALL ASLEEP WHILE LYING DOWN TO REST IN THE AFTERNOON WHEN CIRCUMSTANCES PERMIT: 2
HOW LIKELY ARE YOU TO NOD OFF OR FALL ASLEEP WHILE SITTING AND TALKING TO SOMEONE: 0
HOW LIKELY ARE YOU TO NOD OFF OR FALL ASLEEP WHEN YOU ARE A PASSENGER IN A CAR FOR AN HOUR WITHOUT A BREAK: 2
HOW LIKELY ARE YOU TO NOD OFF OR FALL ASLEEP WHILE SITTING QUIETLY AFTER LUNCH WITHOUT ALCOHOL: 0

## 2023-01-10 NOTE — PROGRESS NOTES
PULMONARY, CRITICAL CARE AND SLEEP MEDICINE  Outpatient Sleep Progress Note    CC: Snoring, [facility]  Referring Provider: Dr. Jessy Vega     Interval History 1/10/23:  31-90  -  Had HST 10/17/2022 demonstrating mild SINAI with AHI 9.7. Subsequent titration 11/16/2022 Recommend CPAP 11. Treatment emergent CSA. Set up with [facility] 12/5/2022. Perceives benefit from CPAP, initially had influenza and then subsequently pneumonia & now nasal congestion so difficulty wearing when sick but otherwise tolerating well. Less daytime sleepiness, more energy at work. Wife has noticed more energy and less sleepy as well. Switched to nasal mask and likes that better. To bed 8-9 am and back up 11-12p then another short nap before works, works nights.   -  S[facility] treated with benefit with CPAP 11; used 3:52 hrs avg with compliance >4 hour use 12/30 (used 25) days, residual AHI 1.6. [place] is: 4. Presenting HPI 8/3/2022:  44 yo male who recently established with cardiology & electrophysiology after incidental finding of frequent PVCs. Presents with a several year history of mild to moderate snoring, no known modifying factors, no associated daytime sleepiness or observed apneas. No choking or gasping during sleep. Occasional morning headaches. No treatments tried so far. Has not been evaluated for sleep apnea in the past.  Pt works 3rd shift and has a very irregular sleep schedule. Usually sleeps about 6-8 hrs daily. [place] is 5. No car wrecks or near wrecks because of the sleepiness. No nodding off while driving. Has cut back on caffeine usage. No history of atrial fibrillation but large PVC burden even after starting metoprolol. No history of HTN. Former smoker, 1ppd x15 yrs, quit 6  ya. No known family history of SIN[facility].     reports that he quit smoking about 7 years ago. His smoking use included cigarettes.  He has never used smokeless tobacco.    Son has Down's syndrome & tetralogy of fallot    Review of Systems: Complete Review of system reviewed with patient and noted on attached review of system sheet. PHYSICAL EXAM:  Blood pressure 118/72, pulse 88, resp. rate 16, height 5' 7\" (1.702 m), weight 212 lb (96.2 kg), SpO2 97 %.'   218# Aug 2022; 212# Jan 2023;   Constitutional:  No acute distress. HENT:  Oropharynx is clear and moist. No thyromegaly. Mallampati class II airway with hypertrophied soft tissue structures. Eyes:  Conjunctivae are normal. Pupils equal, round, and reactive to light. No scleral icterus. Neck:  No tracheal deviation present. No obvious thyroid mass. Circumference is 17 inches. CV:  Normal rate, regular rhythm, + extra heart sounds/premature beats. No lower extremity edema. Pulm/Chest:  Clear breath sounds. No wheezes, rales or rhonchi. No accessory muscle usage or stridor. Moves air well. Abdominal:  Soft. No distension or obvious hernia. No tenderness or guarding. Musculoskeletal:  No cyanosis. No clubbing. No obvious joint deformity. Skin:  Skin is warm and dry. No rash or nodules on the exposed extremities. Psychiatric:  Normal mood. Withdrawn affect. Behavior is normal.    Neurological:  Alert, awake and oriented. No obvious cranial nerve deficits. Speech fluent    DATA:  Review of Cardiology & EP records    Holter monitor 6/7/2022: NSR with 24.7% PVCs & ventricular couplet burden    Echo 8/3/22: EF 55%. Normal LV diastolic filling pressure. RV normal in size and function. No significant valvular disease noted. STEMI 8 SPAP. Frequent PVCs noted throughout the study. HST 10/17/2022: AHI 9.7, SPO2 francisco 86%. Titration 11/16/2022: Recommend CPAP 11. Treatment emergent CSA. EST to 26, SC 80%. SL 1.8, REM SL 63. No significant PLMS. EKG NSR with multiple PVCs/bigeminy.      ASSESSMENT:  S[facility], mild on HST - benefits from CPAP  AM headaches, intermittent  Cardiac arrhythmia - with significant asymptomatic PVC burden, refractory to Metoprolol   Dizziness episodes, intermittent at work   Comorbid conditions: Obesity, T2DM, hepatic steatosis  Former smoker, 1ppd x15 yrs quit 6 ya    PLAN:   CPAP 11, [facility] NOT a card-only machine 12/25/2022. Pull report in 30 days for compliance please   Sleep hygiene tips discussed and provided. Specifically cautioned: absolutely no driving motorized vehicles or operating heavy machinery while fatigued, drowsy or sleepy. Weight loss was recommended as a long-term intervention. Pathophysiology and complications of untreated SINAI were discussed with patient to include systemic hypertension, pulmonary hypertension, cardiovascular morbidities, car accidents and all cause mortality. Discussed alternatives to CPAP therapy, including María and HNS.    F/U in 1 year

## 2023-01-10 NOTE — PATIENT INSTRUCTIONS
Great to see you again and take care [place]! We will pull a report from your machine in 30 days to recheck compliance and call you with results. Call us if you need a sooner, but otherwise I will see you in a year!-Ashleigh        Sleep Hygiene. .. Important practices for better sleep:    Avoid naps. This will ensure you are sleepy at bedtime. If you have to take a nap, sleep less than 1 hour, before 3 pm.  SCHEDULE: Have a fixed bedtime and awakening time. The human body thrives on routines. Only deviate from these set sleep times about 1-2 hours on the weekends (more than this will start altering your internal clock). You will feel better keeping a regular sleep cycle and giving your body a dependable pattern, even (especially) if you are retired or not working. Use light to train your biological clock: When you get up in the morning, exposure yourself to bright lights. When preparing for bed, dim the lights and avoid exposure to screens. The blue light from electronic screens tells our brain that it is time to be awake; it inhibits melatonin production which stops our brain from helping us get to sleep. Go to bed only when sleepy; this reduces the time you are awake in bed (which can lead to frustration and negative thoughts about sleep). If you can't fall asleep within 15-30 minutes, get up and do something boring until you feel sleepy again. Sit quietly in the dark or read the warranty on your refrigerator. Don't expose yourself to bright light during this time (especially screens), which would cue your brain that it is time to wake up. Regular exercise is recommended to help you deepen your sleep (and MANY other reasons), but timing is important--aim to exercise in the morning or early afternoon, not within 4-6 hours of your bedtime. Only use your bed for sleeping & intimacy. Do not use your bed as an office, workroom or recreation room.  Let your mind/body \"know\" that the bed is associated with sleeping. Develop sleep rituals. Give your body clues it is time to slow down and sleep. Dim the lights and turn off all screens! Examples include; yoga, deep breathing, listen to relaxing music, a cup of caffeine-free hot tea, a hot bath or a few minutes of reading (in dim light). A hot bath ~90 mins before bed will raise your body temperature, but it is the drop in body temperature that can help you feel sleepy. Avoid heavy, spicy or sugary foods 4-6 hours before bedtime   Stay away from stimulants such as caffeine and nicotine for at least 4-6 hours before bed. Stimulants can interfere with your ability to fall asleep. Caffeine is found in tea, cola, coffee, cocoa and chocolate. Nicotine is found in tobacco products. Avoid alcohol 4-6 hours before bedtime. Alcohol has an immediate sleep-inducing effect, but after a few hours when alcohol levels fall there is a stimulant or \"wake-up\" effect and will cause fragmented sleep. Dont take worries to bed. Leave worries about life, work, school etc. behind you when you go to bed. Some people find it helpful to assign a worry period in the evening or late afternoon to talk or write down the worries and get them out of your system. Ensure your bedroom is quiet and comfortable. A cooler room along with enough blankets to stay warm is recommended. If your room is too noisy, try a white noise machine. If too bright, try black out shades or an eye mask. Uncomfortable bedding can prevent good sleep. Evaluate whether or not this is a source of your problem, and make appropriate changes.         CPAP Equipment Cleaning and Disinfecting Schedule  Equipment Cleaning Frequency Instructions  Disinfecting Frequency   Non-Disposable Filters  Weekly Mild soapy water, Rinse, Air Dry Not Required   Disposable Filters Change as needed  2-4 weeks Do Not Wash Not Required   Hose/tubing Daily Mild soapy water, Rinse, Air Dry Once a week   Mask / Nasal Pillows Daily Mild soapy water, Rinse, Air Dry Once a week   Headgear Weekly Hand wash, Mild soapy water, Rinse, Dry  Not Required   Humidifier Daily Empty water daily  Mild soapy water, Rinse well, Air Dry  Once a week   CPAP Unit As Needed Dust with damp cloth,  No detergents or sprays Not Required         Disinfect (per schedule) with 1 part white vinegar and 3 parts water- soak mask and water chamber for 30 minutes every 1-2 weeks, more often if sick. Allow water/vinegar mixture to run through tubing. Allow all equipment to air dry. Drying Hints:   Always hang tubing away from direct sunlight, as this will cause the tubing to become yellow, brittle and crack over a period of time. DO NOT attach the wet tubing to your CPAP unit to blow-dry it. The moisture from the tubing can drain back into your machine. Moisture in your unit can cause sudden pressure increases or short circuits  DO's and DON'Ts:  - Don't use alcohol-based products to clean your mask, because it can cause the materials to become hard and brittle. - Don't put headgear in the washer or dryer  - Don't use any caustic or household cleaning solutions such as bleach on your CPAP   equipment.  - Do follow the recommended cleaning schedule. - Do change your disposable filter frequently. Adapted From: [email or web address]/cleaning. shtm.   These are general suggestions for all models please follow specific s recommendations and specific instructions

## 2023-01-11 PROBLEM — I49.3 PVC (PREMATURE VENTRICULAR CONTRACTION): Chronic | Status: ACTIVE | Noted: 2022-08-02

## 2023-01-11 PROBLEM — R29.818 SUSPECTED SLEEP APNEA: Status: RESOLVED | Noted: 2021-05-25 | Resolved: 2023-01-11

## 2023-01-11 PROBLEM — K76.0 HEPATIC STEATOSIS: Chronic | Status: ACTIVE | Noted: 2021-05-25

## 2023-01-11 PROBLEM — R06.83 SNORING: Status: RESOLVED | Noted: 2022-10-18 | Resolved: 2023-01-11

## 2023-01-11 PROBLEM — G47.33 OSA (OBSTRUCTIVE SLEEP APNEA): Chronic | Status: ACTIVE | Noted: 2022-11-16

## 2023-01-17 ENCOUNTER — OFFICE VISIT (OUTPATIENT)
Dept: ENDOCRINOLOGY | Age: 43
End: 2023-01-17
Payer: COMMERCIAL

## 2023-01-17 DIAGNOSIS — E11.9 TYPE 2 DIABETES MELLITUS WITHOUT COMPLICATION, WITHOUT LONG-TERM CURRENT USE OF INSULIN (HCC): Primary | Chronic | ICD-10-CM

## 2023-01-17 PROCEDURE — 97802 MEDICAL NUTRITION INDIV IN: CPT | Performed by: DIETITIAN, REGISTERED

## 2023-01-17 NOTE — PROGRESS NOTES
Medical Nutrition Therapy for Diabetes    Sarahi Kashif  January 17, 2023      Patient Care Team:  CAMERON Martinez CNP as PCP - General (Family Medicine)  CAMERON Martinez CNP as PCP - [facility] Empaneled Provider  Cristin Flores MD as Consulting Physician (Orthopedic Surgery)  Remigio Currie DO as Consulting Physician (Orthopedic Surgery)  [facility], [place] as Physician Assistant (Physician Assistant)    Reason for visit: Type 2 Diabetes    ASSESSMENT/PLAN:   NUTRITION DIAGNOSIS  Initial Visit    #1 Problem: Altered Nutrition-Related Laboratory Values (NC-2.2)  Related to: Endocrine/Diabetes   As Evidenced by: Elevated Plasma glucose and/or HgbA1c levels          #2 Problem: Knowledge and Beliefs-NB-3.1                       Food and nutrition deficits     #3 Problem: Excessive Carbohydrate Intake (NI-5.8. 2)  Related to: Food-and nutrition-related knowledge deficit concerning appropriate amount of carbohydrate intake  As evidenced by: Estimated carbohydrate intake that is consistently more than the recommended amounts     NUTRITION INTERVENTION  Nutrition Prescription: 45 grams carbohydrate per meal with protein and non-starch vegetables  15 gram carbohydrate snacks     Diabetes Education/Counseling included: Pathophysiology of Diabetes   Carbohydrate Control, Activity/exercise, Label-reading, Monitoring, Medications, and Hypoyglycemia prevention/treatment    Interventions:  Control Carbohydrate Intake using Plate Guide, Control Carbohydrate Intake using Carb Counting, Increase intake of vegetables, Decrease intake of foods high in saturated fat, and Decrease intake of high-sodium foods  Encouraged to begin eating when awake for the day. Suggested 3 meals and 2 snacks. Have 3rd meal while at work, 3rd shift.   Recommended taking glipizide with 1st meal.  Handouts: Healthy Eating Guidelines for Diabetes-[facility], Sample [place] [facility] - [facility] INPATIENT CARE FACILITY Health, Planning Healthy Meals-[place] Managing and Preventing Hypoglycemia - AND  NUTRITION MONITORING AND EVALUATION  3 meals / 2 snacks  45 gram carbohydrate meals / 15 gram carbohydrate meals  Take sulfonaurea with 1st meal       Patient Active Problem List   Diagnosis    Class 1 obesity without serious comorbidity with body mass index (BMI) of 33.0 to 33.9 in adult    Hepatic steatosis    Type 2 diabetes mellitus without complication, without long-term current use of insulin (HCC)    PVC (premature ventricular contraction)    Personal history of tobacco use    Cardiac arrhythmia    Dizziness, nonspecific    SINAI (obstructive sleep apnea)       Current Outpatient Medications   Medication Sig Dispense Refill    metoprolol succinate (TOPROL XL) 25 MG extended release tablet  (Patient not taking: Reported on 1/10/2023)      albuterol sulfate HFA (PROVENTIL HFA) 108 (90 Base) MCG/ACT inhaler Inhale 2 puffs into the lungs every 6 hours as needed for Wheezing (Patient not taking: Reported on 1/10/2023) 18 g 3    metFORMIN (GLUCOPHAGE-XR) 500 MG extended release tablet Take 2 tablets by mouth 2 times daily 360 tablet 1    glipiZIDE (GLUCOTROL) 5 MG tablet Take 1 tablet by mouth daily 60 tablet 3    dilTIAZem (CARDIZEM CD) 180 MG extended release capsule Take 1 capsule by mouth daily 90 capsule 1    lisinopril (PRINIVIL;ZESTRIL) 5 MG tablet TAKE 1/2 TABLET BY MOUTH DAILY 45 tablet 1    atorvastatin (LIPITOR) 40 MG tablet Take 1 tablet by mouth daily 90 tablet 1    Blood Glucose Monitoring Suppl (FREESTYLE LITE) ROSA 1 Device by Does not apply route daily 1 Device 0    blood glucose monitor strips Test one times a day & as needed for symptoms of irregular blood glucose. 100 strip 1    Lancets MISC 1 each by Does not apply route daily 100 each 3    Alcohol Swabs (ALCOHOL PREP) PADS 1 Units by Does not apply route daily 90 each 1     No current facility-administered medications for this visit.          NUTRITION ASSESSMENT    Biochemical Data:    Lab Results Component Value Date    LABA1C 9.1 12/02/2022     Lab Results   Component Value Date    .5 12/02/2022       Lab Results   Component Value Date    CHOL 120 12/07/2022    CHOL 214 (H) 05/25/2021    CHOL 206 (H) 12/26/2018     Lab Results   Component Value Date    TRIG 114 12/07/2022    TRIG 158 (H) 05/25/2021    TRIG 111 12/26/2018     Lab Results   Component Value Date    HDL 29 (L) 12/07/2022    HDL 38 (L) 05/25/2021    HDL 33 (L) 12/26/2018     Lab Results   Component Value Date    LDLCALC 68 12/07/2022    LDLCALC 144 (H) 05/25/2021    LDLCALC 151 (H) 12/26/2018     Lab Results   Component Value Date    LABVLDL 23 12/07/2022    LABVLDL 32 05/25/2021    LABVLDL 22 12/26/2018     No results found for: CHOLHDLRATIO    Lab Results   Component Value Date    WBC 8.7 07/13/2022    HGB 15.6 07/13/2022    HCT 45.5 07/13/2022    MCV 84.5 07/13/2022     07/13/2022       Lab Results   Component Value Date    CREATININE 0.9 07/13/2022    BUN 14 07/13/2022     07/13/2022    K 4.6 07/13/2022     07/13/2022    CO2 22 07/13/2022       Diabetes Medications: Yes  Knows name and dose of prescribed medications Yes  Knows prescribed schedule for medicationsYes  Recent change in medication type/dosage: No  Stores  medications properlyYes  Comments:     Monitoring:   Has BG meter: Yes  Testing frequency: 3-5 per day  Recent results: after sleeping 90, after eating snack on the way home 260  Hypoglycemia? No    Anthropometric Measurements:   Wt:   Wt Readings from Last 3 Encounters:   01/10/23 212 lb (96.2 kg)   12/18/22 215 lb (97.5 kg)   12/12/22 217 lb (98.4 kg)      BMI:   BMI Readings from Last 3 Encounters:   01/10/23 33.20 kg/m²   12/18/22 33.67 kg/m²   12/12/22 33.99 kg/m²     Patient's stated goal weight:   7% Weight loss goal weight:     Physical Activity History:   Physical activity: climbing and carrying equipment  Frequency of activity: 5 days  Duration of activity: 8 hours  Obstacles to activity: none    Sleep: SINAI- uses C-pap over the last month    Food and Nutrition History:   Nutrition Awareness/Previous DSMES: No  Number of people in household: 5  Frequency of Meals Eaten away from home:3-4/week  Food Availability Problems  Within the past 12 months, have you worried that your food would run out before you got money to buy more? No  Within the past 12 months, has the food you bought not lasted till the end of the month and you didn't have money to get more? No  Beverage consumption: water - 48 ounces, PowerAde zero, diet pop - 1-2 cans or bottles, iced tea unsweet- inconsistent   Alcohol consumption: rarely, small amounts  Usual Food consumption:   2 meals, 1-2 snacks, works 3rd shift, 45-60 gram carbohydrate meals     Barriers:   -none          Follow Up Plan: 3 months Will remain available. Contact information given.      Referring Provider: CAMERON Mckeon - CNP  Time spent with patient: 60 minutes

## 2023-02-08 NOTE — TELEPHONE ENCOUNTER
Please tell patient his usage looks much better, his treatment looks great, and he meets compliance. Please send CR to DME (with OV note?) for proof.

## 2023-02-10 ENCOUNTER — NURSE ONLY (OUTPATIENT)
Dept: CARDIOLOGY CLINIC | Age: 43
End: 2023-02-10

## 2023-02-10 DIAGNOSIS — I49.3 PVC (PREMATURE VENTRICULAR CONTRACTION): Primary | ICD-10-CM

## 2023-02-10 NOTE — PROGRESS NOTES
Monitor placed by Mitchell Melvin RN  Monitor company [employer]  Length of monitor 48 hours  Monitor ordered by JAMIE LUNA ID - [id number]  Device name - Roselia Iglesias  Activation successful prior to pt leaving office?  Yes

## 2023-02-10 NOTE — TELEPHONE ENCOUNTER
Spoke with pt - reviewed compliance. Scheduled his 1 year f/u on 1/12/24 with [facility] and faxed CR & OV note to [facility]) as proof.

## 2023-02-13 NOTE — TELEPHONE ENCOUNTER
Laurie Hernandez from [facility] states that pt is ready to be set up, she will reach out to pt to schedule set up.
Left message with marcum to see if orders were received.
Luis Carlos Samuels called back states patient was set up last week with [facility].
Mel with [facility] returned call and confirmed that orders are rec'd and are in process.
PAP orders. Need faxed, with testing/OV note/demographics/insurance, once signed, to [facility].
Pt was setup 12/5/2022 with a card only [facility]med. 31-90 scheduled 1/10/2022.
Spoke to sonya at [facility] who stated she could not find orders. Orders printed and re-faxed.
yes

## 2023-02-14 DIAGNOSIS — E11.9 TYPE 2 DIABETES MELLITUS WITHOUT COMPLICATION, WITHOUT LONG-TERM CURRENT USE OF INSULIN (HCC): Primary | ICD-10-CM

## 2023-02-14 RX ORDER — BLOOD-GLUCOSE,RECEIVER,CONT
1 EACH MISCELLANEOUS DAILY
Qty: 1 EACH | Refills: 0 | Status: SHIPPED | OUTPATIENT
Start: 2023-02-14

## 2023-02-14 RX ORDER — BLOOD-GLUCOSE TRANSMITTER
1 EACH MISCELLANEOUS DAILY
Qty: 1 EACH | Refills: 3 | Status: SHIPPED | OUTPATIENT
Start: 2023-02-14

## 2023-02-14 RX ORDER — BLOOD-GLUCOSE SENSOR
1 EACH MISCELLANEOUS
Qty: 3 EACH | Refills: 11 | Status: SHIPPED | OUTPATIENT
Start: 2023-02-14

## 2023-02-16 RX ORDER — ATORVASTATIN CALCIUM 40 MG/1
TABLET, FILM COATED ORAL
Qty: 90 TABLET | Refills: 1 | Status: SHIPPED | OUTPATIENT
Start: 2023-02-16

## 2023-02-23 ENCOUNTER — TELEPHONE (OUTPATIENT)
Dept: PRIMARY CARE CLINIC | Age: 43
End: 2023-02-23

## 2023-02-23 NOTE — TELEPHONE ENCOUNTER
Received a fax from [facility] pharmacies on his Dexcom sensor. I called and left a message for the patient to call me back. Patient did call me back to confirm that he had already received all of his supplies and that the form was from the site he was looking at before she had ordered everything for him. He told me that Justice Rumble did not need to fill it out. All forms were tossed.

## 2023-03-02 ENCOUNTER — TELEPHONE (OUTPATIENT)
Dept: CARDIOLOGY CLINIC | Age: 43
End: 2023-03-02

## 2023-03-02 NOTE — TELEPHONE ENCOUNTER
I attempted to call the patient to relay cardiac event monitor results. Results showed PVC burden of 16.2% burden. No sustained arrhythmias. Follow up with [facility] as scheduled. LMOV.

## 2023-03-07 DIAGNOSIS — I49.3 PVC (PREMATURE VENTRICULAR CONTRACTION): ICD-10-CM

## 2023-03-08 ENCOUNTER — OFFICE VISIT (OUTPATIENT)
Dept: CARDIOLOGY CLINIC | Age: 43
End: 2023-03-08
Payer: COMMERCIAL

## 2023-03-08 VITALS
WEIGHT: 218.8 LBS | HEIGHT: 67 IN | BODY MASS INDEX: 34.34 KG/M2 | OXYGEN SATURATION: 97 % | SYSTOLIC BLOOD PRESSURE: 110 MMHG | HEART RATE: 77 BPM | DIASTOLIC BLOOD PRESSURE: 78 MMHG

## 2023-03-08 DIAGNOSIS — I49.9 CARDIAC ARRHYTHMIA, UNSPECIFIED CARDIAC ARRHYTHMIA TYPE: ICD-10-CM

## 2023-03-08 DIAGNOSIS — I49.3 PVC (PREMATURE VENTRICULAR CONTRACTION): Primary | ICD-10-CM

## 2023-03-08 DIAGNOSIS — G47.33 OSA (OBSTRUCTIVE SLEEP APNEA): ICD-10-CM

## 2023-03-08 DIAGNOSIS — R53.83 EASY FATIGABILITY: ICD-10-CM

## 2023-03-08 PROCEDURE — 99214 OFFICE O/P EST MOD 30 MIN: CPT | Performed by: INTERNAL MEDICINE

## 2023-03-08 PROCEDURE — 93000 ELECTROCARDIOGRAM COMPLETE: CPT | Performed by: INTERNAL MEDICINE

## 2023-03-08 ASSESSMENT — ENCOUNTER SYMPTOMS
STRIDOR: 0
HEMATOCHEZIA: 0
HEMATEMESIS: 0
SHORTNESS OF BREATH: 0
LEFT EYE: 0
WHEEZING: 0
SNORING: 0
RIGHT EYE: 0

## 2023-03-08 NOTE — PATIENT INSTRUCTIONS
Plan:     1. Continue current cardiac medication as prescribed  2. Continue to monitor symptoms  3.  Follow up with me in 4 months

## 2023-03-08 NOTE — PROGRESS NOTES
Assessment:     1. Frequent PVC's: Patient presented with an elevated PVC count of approximately 21% of total beats. He has been essentially asymptomatic. He was on metoprolol sustained-release for a few weeks. His ECG initial visit showed continuing significant burden of PVCs. The morphology of the PVC is left bundle branch block pattern with a left inferior axis and precordial transition between V2 and V3. This is highly suggestive of an outflow tract origin possibly from the RVOT but cannot exclude the possibility of originating from the LVOT or coronary cusp area. An echocardiogram (August 2022) showed normal overall cardiac structure and function. Repeat 24 hour Holter monitor on metoprolol showed no significant improvement (PVC count 20.5%). after evaluation last time, we switched him from metoprolol to diltiazem  mg daily. He appears to have significantly fewer PVC's on exam and ECG today. But a repeat event monitor still showed a 16% PVC burden. Furthermore, he was recently found to have significant SINAI on a HST (9.7 AHI). Obstructive sleep apnea can often lead to ventricular and atrial arrhythmias including PVCs. He has started CPAP therapy and doing better in terms of sleep and daytime energy. I recommended to the patient regular exercise and weight loss as this may help his condition. Will see how he does with that and repeat 48 Holter monitor in few months. No change in management at this time. He feels fine. 2. Good blood pressure control    3. Obstructive sleep apnea: I had a detailed discussion with the patient about the connection between obstructive sleep apnea and cardiac rhythm disturbances. We discussed the fact that the management of such arrhythmias is made much more difficult by the presence of sleep apnea. Conversely, the proper treatment of obstructive sleep apnea can greatly facilitate better treatment outcomes.   Strongly urged patient to comply with diagnostic and treatment recommendations for his obstructive sleep apnea.    4. Diabetes mellitus: diagnosed in May 2021. Reports good overall blood glucose control at home.     Plan:     1. Continue current cardiac medication as prescribed  2. Continue to monitor symptoms  3. Follow up with me in 4 months     Subjective:     Patient ID: Dima Hagan is a 43 y.o. male.    Chief Complaint:  Chief Complaint   Patient presents with    Follow-up    Irregular Heart Beat     PVC     HPI    Patient is a pleasant 43 y.o. male who presents for evaluation of frequent premature ventricular contractions. The patient has a past medical history of obesity, type 2 DM, hepatic steatosis, and frequent PVC's. The patient was noted to have an EKG demonstrating frequent ventricular bigeminy on 06/06/2022 during an office visit where he was diagnosed with an acute sinus infection. Patient wore a cardiac event monitor from 07/15/2022 to 07/17/2022 which demonstrated predominately SR with an average HR of 77 (). PAC burden 0%, PVC burden 22%. He was started on Metoprolol 50 mg by his primary care physician.     Office Visit (Dr. Ortega, Interventional cardiology, 07/13/2022)  Dima Hagan is a 42 y.o. male with a history of obesity, type 2 DM, hepatic steatosis, and tobacco use referred for further evaluation of frequent PVCs.       The patient was noted to have frequent PVCs in a pattern of ventricular bigeminy on an EKG obtained on 6/6/22 during a clinic visit where he was diagnosed with acute bacterial sinusitis.  At that time, he was prescribed a 24-hour Holter monitor which showed a 24.7% PVC burden.  He was subsequently started on metoprolol succinate 50 mg daily which he says he has been taking for about one month.  In general, the patient reports that he has been feeling well and says that he never notices the PVCs.  He says that he occasionally has a sensation like he can't take a deep breath and has some occasional mild,  non-limiting lightheadedness, while at work. He denies any chest pain, palpitations, syncope, lower extremity, or orthopnea. His wife says that he snores and he states that he sometimes wakes up with headaches. He works third shift and has an irregular sleep schedule. He has never been formally evaluated for sleep apnea. He previously smoked 1 pack of cigarettes for 15 years and quit 5-6 years ago. He denies any history of heavy alcohol consumption or recreational drug use. His son has Down's syndrome and tetralogy of Fallot and has had two open heart surgeries. He is unaware of any other heart disease in his family. and suspected sleep apnea. He saw his PCP 6/6/2022 and had an EKG which showed NSR with bigmeny PVC's noted. He wore a 24-hour holter monitor which showed underlying NSR with frequent PVC's and ventricular couplets. Today, 7/13/2022, patient reports feeling overall well. He was started on metoprolol for PVC's by his PCP, and has been taking for about 1 month. He has been tolerating this well. He states he never notices his PVC's, they were found during a PCP office visit. He states occasionally when he goes to take a deep breath he doesn't feel like he can get a full breath. He has had random occasional episodes of dizziness while he is at work, states this happens when he has a sinus infection sometimes. He states his wife tells him that he snores. He states he occasionally will wake up with headaches, states his sleep schedule is off due to working third shift. He has never been evaluated for sleep apnea. He is unaware of any heart disease in his family. He states his son has Down Syndrome and had two open heart surgeries for tetralgoy of fallot. He is a previous smoker, quit 5-6 years ago. Smoked for about 15 years, roughly 1PPD. He states he drinks caffeine but has cut back on this. He denies heavy alcohol use consistently, will drink socially. Denies any illicit drug use.  Denies any fevers, chills, nausea, or diarrhea. Office Visit (EP clinic, 08/02/2022)  Patient presents to EP clinic today for assessment of his frequent ventricular arrhythmia. He is feeling well overall. He states his echocardiogram is scheduled for tomorrow. He reports that he does not feel the PVC's. He states that in May of 2021 he was diagnosed with diabetes and he was then found to have PVC's on and EKG. He reports that his blood sugar remains around the 120's at home. He reports that he does snore at night. He states he has an appointment tomorrow with the pulmonologist to evaluate for sleep apnea. He states he drinks one or two caffeine drinks a day. He reports he is a former smoker. He reports he is able to exercise with not shortness of breath or chest pain. He states he works nightshift as a . Patient denies current edema, chest pain, sob, palpitations, dizziness or syncope. Patient is taking all cardiac medications as prescribed and tolerates them well. Denies any recent issues with bleeding or bruising. No side effects reported from taking metoprolol. No alcohol use reported. No family history of sudden cardiac death or significant cardiac diseases. The patient had an echocardiogram on 08/03/2022 that demonstrated an EF of 55%. Frequent PVC's were noted during the study. Patient wore a cardiac event monitor from 08/26/2022 to 08/27/2022 which demonstrated predominately SR with an average HR of 81 (). PAC burden <0.01%, PVC burden 20.45%. Office Visit ([address], 09/02/2022)  Today he presents with his wife for follow up of PVC's after wearing a 24 hours Holter monitor. He reports he feels well and OK overall. His wife reports that their son has a congenital heart defect which causes them to worry about having further heart issues in the family. Patient states he is having an at home sleep study in [place]. 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 Patient denies current edema, chest pain, shortness of breath, palpitations, dizziness or syncope. He is taking all cardiac medications as prescribed and tolerates them well. He denies any recent issues with bleeding or bruising. The patient underwent a sleep study at home on 10/17/2022 that demonstrated mild obstructive sleep apnea. Office Visit ([address], 11/04/2022)  Today he presents with his wife for follow up. He states that he completed an at home sleep study. He states that he has to have a test completed in the sleep medicine clinic this month. He states that he is not experiencing fatigue. He states that when he is weighing himself at home, he has lost a few pounds. Patient denies current edema, chest pain, shortness of breath, palpitations, dizziness or syncope. Patient is taking all cardiac medications as prescribed and tolerates them well. He denies any recent issues with bleeding or bruising. Interval History since last office visit:  Patient wore a cardiac event monitor from 2/10/2023 to 2/12/2023 which demonstrated predominately SR with an average HR of 84 (). PAC burden 0%, PVC burden 16.2% with couplets and triplets. Office Visit ([address], 3/8/2023)  Today he presents for follow up visit. He continues to feel well. He reports he has been compliant with CPAP since 11/2022. He reports using CPAP 6-8 hours a night. He feels more refreshed when he wakes up and has noticed less fatigue during the day. Patient denies current edema, chest pain, shortness of breath, palpitations, dizziness or syncope. Patient is taking all cardiac medications as prescribed and tolerates them well. No leg swelling. He reports minimal caffeine usage. He denies alcohol consumption. He denies tobacco usage. Review of Systems  Review of Systems   Constitutional: Negative for malaise/fatigue, weight gain and weight loss. HENT:  Negative for nosebleeds and stridor.     Eyes:  Negative for vision loss in left eye and vision loss in right eye. Cardiovascular:  Negative for chest pain, dyspnea on exertion, leg swelling and syncope. Respiratory:  Negative for shortness of breath, snoring and wheezing. Hematologic/Lymphatic: Negative for bleeding problem. Does not bruise/bleed easily. Skin:  Negative for itching and rash. Musculoskeletal:  Negative for joint pain and joint swelling. Gastrointestinal:  Negative for hematemesis and hematochezia. Genitourinary:  Negative for dysuria and hematuria. Neurological:  Negative for dizziness and light-headedness. Psychiatric/Behavioral:  Negative for altered mental status. The patient is not nervous/anxious. Past Medical History:   Diagnosis Date    Hyperlipidemia     Kidney stones     Sleep apnea     [facility]          Social History     Socioeconomic History    Marital status:      Spouse name: Not on file    Number of children: Not on file    Years of education: Not on file    Highest education level: Not on file   Occupational History    Not on file   Tobacco Use    Smoking status: Former     Packs/day: 0.00     Types: Cigarettes     Quit date: 2015     Years since quittin.4    Smokeless tobacco: Never   Vaping Use    Vaping Use: Former    Start date: 2015   Substance and Sexual Activity    Alcohol use:  Yes     Alcohol/week: 1.0 - 3.0 standard drink     Types: 1 - 3 Cans of beer per week     Comment: occ    Drug use: No    Sexual activity: Yes     Partners: Female   Other Topics Concern    Not on file   Social History Narrative    Not on file     Social Determinants of Health     Financial Resource Strain: Low Risk     Difficulty of Paying Living Expenses: Not hard at all   Food Insecurity: No Food Insecurity    Worried About [address] in the Last Year: Never true    [facility] [employer] in the Last Year: Never true   Transportation Needs: No Transportation Needs    Lack of Transportation (Medical): No    Lack of Transportation (Non-Medical): No   Physical Activity: Unknown    Days of Exercise per Week: 3 days    Minutes of Exercise per Session: Not on file   Stress: Not on file   Social Connections: Not on file   Intimate Partner Violence: Not At Risk    Fear of Current or Ex-Partner: No    Emotionally Abused: No    Physically Abused: No    Sexually Abused: No   Housing Stability: Low Risk     Unable to Pay for Housing in the Last Year: No    Number of Places Lived in the Last Year: 0    Unstable Housing in the Last Year: No         Family History   Problem Relation Age of Onset    Diabetes Other     Diabetes Father     Thyroid Disease Mother     No Known Problems Brother     No Known Problems Brother     No Known Problems Brother          Objective:     /78   Pulse 77   Ht 5' 7\" (1.702 m)   Wt 218 lb 12.8 oz (99.2 kg)   SpO2 97%   BMI 34.27 kg/m²     Physical Exam  Constitutional:       Appearance: Normal appearance. HENT:      Head: Normocephalic and atraumatic. Nose: Nose normal. No rhinorrhea. Eyes:      General: No scleral icterus. Conjunctiva/sclera: Conjunctivae normal.   Cardiovascular:      Rate and Rhythm: Normal rate and regular rhythm. No extrasystoles are present. Pulmonary:      Effort: Pulmonary effort is normal.      Breath sounds: Normal breath sounds. Abdominal:      General: There is no distension. Musculoskeletal:         General: Normal range of motion. Cervical back: Normal range of motion and neck supple. Skin:     General: Skin is warm and dry. Neurological:      General: No focal deficit present. Mental Status: He is alert and oriented to person, place, and time.    Psychiatric:         Mood and Affect: Mood normal.         Behavior: Behavior normal.     ECG Interpretation:  (Date: 03/08/2023)  Rhythm: Sinus rhythm  Rate: 65 BPM  PAC's / PVC's present: None  Conduction abnormalities: Normal AV conduction    ECG Interpretation:  (Date: 11/04/2022)  Rhythm: Sinus rhythm  Rate: 60 BPM  PAC's / PVC's present: None        ECG Interpretation: (Date: 08/02/2022)  Rhythm: Sinus Rhythm  Rate: 86 BPM  PAC's / PVC's present: Yes              ECG Interpretation:  (Date: 07/13/2022)  Rhythm: Sinus rhythm  Rate: 68 BPM  PAC's / PVC's present: Yes  Conduction abnormalities: None      Echocardiogram  (Date: 08/03/2022)   Summary   Frequent PVC's noted throughout the study. Normal left ventricular size and wall thickness. Left ventricular systolic function is normal with ejection fraction   estimated at 55%. No regional wall motion abnormalities. Normal left ventricular diastolic filling pressure. The right ventricle is normal in size and function. No significant valvular disease noted. Inadequate tricuspid regurgitation jet to estimate systolic artery pressure   (SPAP).       Stress Test   N/A      Current Medications     Current Outpatient Medications   Medication Sig Dispense Refill    atorvastatin (LIPITOR) 40 MG tablet TAKE 1 TABLET BY MOUTH EVERY DAY 90 tablet 1    Continuous Blood Gluc  (DEXCOM G6 ) ROSA 1 each by Does not apply route daily 1 each 0    Continuous Blood Gluc Sensor (DEXCOM G6 SENSOR) MISC 1 each by Does not apply route every 10 days 3 each 11    Continuous Blood Gluc Transmit (DEXCOM G6 TRANSMITTER) MISC 1 each by Does not apply route daily 1 each 3    metFORMIN (GLUCOPHAGE-XR) 500 MG extended release tablet Take 2 tablets by mouth 2 times daily 360 tablet 1    glipiZIDE (GLUCOTROL) 5 MG tablet Take 1 tablet by mouth daily 60 tablet 3    dilTIAZem (CARDIZEM CD) 180 MG extended release capsule Take 1 capsule by mouth daily 90 capsule 1    lisinopril (PRINIVIL;ZESTRIL) 5 MG tablet TAKE 1/2 TABLET BY MOUTH DAILY 45 tablet 1    Blood Glucose Monitoring Suppl (FREESTYLE LITE) ROSA 1 Device by Does not apply route daily 1 Device 0    blood glucose monitor strips Test one times a day & as needed for symptoms of irregular blood glucose. 100 strip 1    Alcohol Swabs (ALCOHOL PREP) PADS 1 Units by Does not apply route daily 90 each 1    metoprolol succinate (TOPROL XL) 25 MG extended release tablet  (Patient not taking: Reported on 1/10/2023)      albuterol sulfate HFA (PROVENTIL HFA) 108 (90 Base) MCG/ACT inhaler Inhale 2 puffs into the lungs every 6 hours as needed for Wheezing (Patient not taking: Reported on 1/10/2023) 18 g 3    Lancets MISC 1 each by Does not apply route daily 100 each 3     No current facility-administered medications for this visit. Lab Review     Lab Results   Component Value Date/Time     07/13/2022 03:09 PM    K 4.6 07/13/2022 03:09 PM     07/13/2022 03:09 PM    CO2 22 07/13/2022 03:09 PM    BUN 14 07/13/2022 03:09 PM    CREATININE 0.9 07/13/2022 03:09 PM    GLUCOSE 127 07/13/2022 03:09 PM    CALCIUM 9.6 07/13/2022 03:09 PM        Lab Results   Component Value Date    WBC 8.7 07/13/2022    HGB 15.6 07/13/2022    HCT 45.5 07/13/2022    MCV 84.5 07/13/2022     07/13/2022       Lab Results   Component Value Date    TSHFT4 2.23 07/13/2022       No results found for: BNP    Scribe's attestation:   This note was scribed in the presence of Dr. Staci Eisenmenger, MD by Shree Walker RN

## 2023-03-09 RX ORDER — LISINOPRIL 5 MG/1
2.5 TABLET ORAL DAILY
Qty: 15 TABLET | Refills: 2 | Status: SHIPPED | OUTPATIENT
Start: 2023-03-09

## 2023-03-10 DIAGNOSIS — I49.3 PVC (PREMATURE VENTRICULAR CONTRACTION): Primary | Chronic | ICD-10-CM

## 2023-03-10 DIAGNOSIS — R42 DIZZINESS, NONSPECIFIC: ICD-10-CM

## 2023-03-15 ENCOUNTER — OFFICE VISIT (OUTPATIENT)
Dept: PRIMARY CARE CLINIC | Age: 43
End: 2023-03-15
Payer: COMMERCIAL

## 2023-03-15 VITALS
BODY MASS INDEX: 34.46 KG/M2 | WEIGHT: 220 LBS | DIASTOLIC BLOOD PRESSURE: 64 MMHG | HEART RATE: 83 BPM | TEMPERATURE: 97.4 F | SYSTOLIC BLOOD PRESSURE: 102 MMHG | OXYGEN SATURATION: 98 %

## 2023-03-15 DIAGNOSIS — I49.3 PVC (PREMATURE VENTRICULAR CONTRACTION): Chronic | ICD-10-CM

## 2023-03-15 DIAGNOSIS — E11.9 TYPE 2 DIABETES MELLITUS WITHOUT COMPLICATION, WITHOUT LONG-TERM CURRENT USE OF INSULIN (HCC): Primary | ICD-10-CM

## 2023-03-15 DIAGNOSIS — G47.33 OSA (OBSTRUCTIVE SLEEP APNEA): Chronic | ICD-10-CM

## 2023-03-15 LAB — HBA1C MFR BLD: 7 %

## 2023-03-15 PROCEDURE — 83036 HEMOGLOBIN GLYCOSYLATED A1C: CPT

## 2023-03-15 PROCEDURE — 3051F HG A1C>EQUAL 7.0%<8.0%: CPT

## 2023-03-15 PROCEDURE — 99213 OFFICE O/P EST LOW 20 MIN: CPT

## 2023-03-15 ASSESSMENT — PATIENT HEALTH QUESTIONNAIRE - PHQ9
SUM OF ALL RESPONSES TO PHQ QUESTIONS 1-9: 0
2. FEELING DOWN, DEPRESSED OR HOPELESS: 0
1. LITTLE INTEREST OR PLEASURE IN DOING THINGS: 0
SUM OF ALL RESPONSES TO PHQ QUESTIONS 1-9: 0
SUM OF ALL RESPONSES TO PHQ9 QUESTIONS 1 & 2: 0
SUM OF ALL RESPONSES TO PHQ QUESTIONS 1-9: 0
SUM OF ALL RESPONSES TO PHQ QUESTIONS 1-9: 0

## 2023-03-15 ASSESSMENT — ENCOUNTER SYMPTOMS
SHORTNESS OF BREATH: 0
VOMITING: 0
COUGH: 0
NAUSEA: 0

## 2023-03-15 NOTE — PROGRESS NOTES
[facility]  3/15/2023    Dima Hagan (:  1980) is a 43 y.o. male, here for evaluation of the following medical concerns:    Chief Complaint   Patient presents with    Diabetes        ASSESSMENT/ PLAN  1. Type 2 diabetes mellitus without complication, without long-term current use of insulin (HCC)  - POCT glycosylated hemoglobin (Hb A1C)  - A1c is 7.0 today. Continue current regimen.  - Discussed stopping glipizide if BS continuously dropping.   - Keep appointment with Abdias Hastings RD next month.    2. S[facility]AI (obstructive sleep apnea)  - Stable; continue CPAP.    3. PVC (premature ventricular contraction)  - Stable; keep follow-up appointment with Cardiology.     - He is not due for any lab work today.    Return in about 3 months (around 6/15/2023) for DM.    Lab Results   Component Value Date    LABA1C 7.0 03/15/2023    LABA1C 9.1 2022    LABA1C 7.1 2022     Lab Results   Component Value Date    LABMICR 2.00 (H) 2022    LDLCALC 68 2022    CREATININE 0.9 2022        HPI  Here today for a follow-up visit.    +DM2. A1c was 9.1 at last visit. Today is 7.0.  Current medications include: metformin 1000mg BID and glipizide 5mg daily. He denies any side effects from the medication; no GI issues.  Denies any significant episodes of hypoglycemia.  Has noticed that the timing of his glipizide and activity level may cause his sugar to drop <90 mg/dL.   For instance, was playing Softball last night and noticed that his sugar was dropping too fast.   He has a Dexcom CGM. Will alert him when his sugar is starting to dip too quickly. Reports shows that he has been in range 74%. Avg glucose is 161 mg/dL.   Active with Abdias Hill RD; has a follow-up scheduled for next month.  Weight up a few lbs. Will start to be more active during the Spring season. Recently purchased a bicycle; plans to ride with her daughter.  Completed his eye exam in January. [employer] in  [place]. States that everything looked normal.     +SINAI. Follows with Pulmonology. On CPAP. Tolerating well. Reports improvement in symptoms. +PVCs. Follows with Cardiology- Dr. Hetal Rowan. Recently had a follow-up appointment. He is on diltiazem. Hoping to get PVC burden between 10-15%. No change in treatment regimen as of now. Sounds like the plan is to repeat the 48-hour heart monitor. No plan for an ablation at this time. Denies dizziness, leg swelling, palpitations, CP, etc.      ROS  Review of Systems   Constitutional:  Negative for chills, fatigue and fever. Respiratory:  Negative for cough and shortness of breath. Cardiovascular:  Negative for chest pain, palpitations and leg swelling. Gastrointestinal:  Negative for nausea and vomiting. Endocrine: Negative for polydipsia, polyphagia and polyuria. Genitourinary:  Negative for frequency. Musculoskeletal:  Negative for myalgias. Skin: Negative. Neurological:  Negative for dizziness and weakness. Psychiatric/Behavioral: Negative.        HISTORIES  Current Outpatient Medications on File Prior to Visit   Medication Sig Dispense Refill    lisinopril (PRINIVIL;ZESTRIL) 5 MG tablet Take 0.5 tablets by mouth daily 15 tablet 2    atorvastatin (LIPITOR) 40 MG tablet TAKE 1 TABLET BY MOUTH EVERY DAY 90 tablet 1    Continuous Blood Gluc  (DEXCOM G6 ) ROSA 1 each by Does not apply route daily 1 each 0    Continuous Blood Gluc Sensor (DEXCOM G6 SENSOR) MISC 1 each by Does not apply route every 10 days 3 each 11    Continuous Blood Gluc Transmit (DEXCOM G6 TRANSMITTER) MISC 1 each by Does not apply route daily 1 each 3    metFORMIN (GLUCOPHAGE-XR) 500 MG extended release tablet Take 2 tablets by mouth 2 times daily 360 tablet 1    glipiZIDE (GLUCOTROL) 5 MG tablet Take 1 tablet by mouth daily 60 tablet 3    dilTIAZem (CARDIZEM CD) 180 MG extended release capsule Take 1 capsule by mouth daily 90 capsule 1    Blood Glucose Monitoring Suppl (FREESTYLE LITE) ROSA 1 Device by Does not apply route daily 1 Device 0    blood glucose monitor strips Test one times a day & as needed for symptoms of irregular blood glucose. 100 strip 1    Lancets MISC 1 each by Does not apply route daily 100 each 3    Alcohol Swabs (ALCOHOL PREP) PADS 1 Units by Does not apply route daily 90 each 1     No current facility-administered medications on file prior to visit. Past Medical History:   Diagnosis Date    Hyperlipidemia     Kidney stones     Sleep apnea     Type 2 diabetes mellitus without complication [facility])      Patient Active Problem List   Diagnosis    Class 1 obesity without serious comorbidity with body mass index (BMI) of 33.0 to 33.9 in adult    Hepatic steatosis    Type 2 diabetes mellitus without complication, without long-term current use of insulin (HCC)    PVC (premature ventricular contraction)    Personal history of tobacco use    Cardiac arrhythmia    Dizziness, nonspecific    SINAI (obstructive sleep apnea)       PE  Vitals:    03/15/23 0824   BP: 102/64   Pulse: 83   Temp: 97.4 °F (36.3 °C)   TempSrc: Temporal   SpO2: 98%   Weight: 220 lb (99.8 kg)     Estimated body mass index is 34.46 kg/m² as calculated from the following:    Height as of 3/8/23: 5' 7\" (1.702 m). Weight as of this encounter: 220 lb (99.8 kg). Physical Exam  Vitals reviewed. Constitutional:       Appearance: Normal appearance. HENT:      Head: Normocephalic. Right Ear: External ear normal.      Left Ear: External ear normal.      Nose: Nose normal.      Mouth/Throat:      Mouth: Mucous membranes are moist.   Eyes:      Extraocular Movements: Extraocular movements intact. Pupils: Pupils are equal, round, and reactive to light. Cardiovascular:      Rate and Rhythm: Normal rate and regular rhythm. Pulses: Normal pulses. Heart sounds: Normal heart sounds. Pulmonary:      Effort: Pulmonary effort is normal.      Breath sounds: Normal breath sounds. Abdominal:      General: Abdomen is flat. Bowel sounds are normal.      Palpations: Abdomen is soft. Musculoskeletal:         General: Normal range of motion. Cervical back: Normal range of motion and neck supple. Skin:     General: Skin is warm and dry. Capillary Refill: Capillary refill takes less than 2 seconds. Neurological:      General: No focal deficit present. Mental Status: He is alert.    Psychiatric:         Mood and Affect: Mood normal.       Jessica Mendoza, CAMERON - CNP

## 2023-03-16 DIAGNOSIS — E11.9 TYPE 2 DIABETES MELLITUS WITHOUT COMPLICATION, WITHOUT LONG-TERM CURRENT USE OF INSULIN (HCC): ICD-10-CM

## 2023-03-16 RX ORDER — METFORMIN HYDROCHLORIDE 500 MG/1
TABLET, EXTENDED RELEASE ORAL
Qty: 360 TABLET | Refills: 1 | Status: SHIPPED | OUTPATIENT
Start: 2023-03-16

## 2023-03-31 DIAGNOSIS — I49.3 PVC (PREMATURE VENTRICULAR CONTRACTION): ICD-10-CM

## 2023-04-03 RX ORDER — DILTIAZEM HYDROCHLORIDE 180 MG/1
180 CAPSULE, COATED, EXTENDED RELEASE ORAL DAILY
Qty: 90 CAPSULE | Refills: 1 | Status: SHIPPED | OUTPATIENT
Start: 2023-04-03

## 2023-04-12 ENCOUNTER — APPOINTMENT (OUTPATIENT)
Dept: GENERAL RADIOLOGY | Age: 43
End: 2023-04-12
Payer: COMMERCIAL

## 2023-04-12 ENCOUNTER — HOSPITAL ENCOUNTER (EMERGENCY)
Age: 43
Discharge: HOME | End: 2023-04-12
Attending: STUDENT IN AN ORGANIZED HEALTH CARE EDUCATION/TRAINING PROGRAM
Payer: COMMERCIAL

## 2023-04-12 VITALS
HEART RATE: 68 BPM | OXYGEN SATURATION: 98 % | HEIGHT: 68 IN | RESPIRATION RATE: 16 BRPM | SYSTOLIC BLOOD PRESSURE: 134 MMHG | BODY MASS INDEX: 31.83 KG/M2 | DIASTOLIC BLOOD PRESSURE: 82 MMHG | TEMPERATURE: 97.5 F | WEIGHT: 210 LBS

## 2023-04-12 DIAGNOSIS — M25.532 LEFT WRIST PAIN: Primary | ICD-10-CM

## 2023-04-12 PROCEDURE — 99283 EMERGENCY DEPT VISIT LOW MDM: CPT

## 2023-04-12 PROCEDURE — 73130 X-RAY EXAM OF HAND: CPT

## 2023-04-12 PROCEDURE — 73110 X-RAY EXAM OF WRIST: CPT

## 2023-04-12 ASSESSMENT — PAIN DESCRIPTION - ORIENTATION
ORIENTATION: LEFT
ORIENTATION: LEFT

## 2023-04-12 ASSESSMENT — PAIN - FUNCTIONAL ASSESSMENT
PAIN_FUNCTIONAL_ASSESSMENT: 0-10
PAIN_FUNCTIONAL_ASSESSMENT: 0-10

## 2023-04-12 ASSESSMENT — PAIN SCALES - GENERAL
PAINLEVEL_OUTOF10: 5
PAINLEVEL_OUTOF10: 6

## 2023-04-12 ASSESSMENT — PAIN DESCRIPTION - LOCATION
LOCATION: WRIST
LOCATION: WRIST

## 2023-04-12 ASSESSMENT — PAIN DESCRIPTION - DESCRIPTORS: DESCRIPTORS: DISCOMFORT

## 2023-04-12 NOTE — ED PROVIDER NOTES
Emergency Department Encounter    Patient: Yolanda Gaytan  MRN: [id number]  : 1980  Date of Evaluation: 2023  ED Provider:  Britt Fisher MD    Triage Chief Complaint:   Wrist Pain (States fell catching self with hand and now with left wrist pain)    [place]:  Yolanda Gaytan is a 37 y.o. male presenting with left wrist/hand pain. Patient states that he stepped back off the curb and fell. Patient states he landed on his left wrist.  States he did not hit any other region denies head injury, loss of consciousness, headache, blurred vision, focal deficits motor or sensory changes. Denies any neck or back pain. Denies abdominal pain, nausea vomiting, diarrhea constipation, urinary symptoms. Denies pain in any other extremity. States he has pain over the left wrist that is moderate, constant, stabbing, worse with palpation and movement without relieving factors. Denies any motor or sensory changes.     ROS - see HPI, below listed is current ROS at time of my eval:  At least 14 systems reviewed, negative other HPI    Past Medical History:   Diagnosis Date    Hyperlipidemia     Kidney stones     Sleep apnea     Type 2 diabetes mellitus without complication ([facility] [address]      Past Surgical History:   Procedure Laterality Date    KNEE ARTHROSCOPY Right 16    Partial LATERAL AND MEDIAL MENISCECTOMY, CHONDROPLASTY, SYNOVECTOMY    KNEE ARTHROSCOPY Right 2017    KNEE SURGERY Left     SHOULDER SURGERY Right      Family History   Problem Relation Age of Onset    Diabetes Other     Diabetes Father     Thyroid Disease Mother     No Known Problems Brother     No Known Problems Brother     No Known Problems Brother      Social History     Socioeconomic History    Marital status:      Spouse name: Not on file    Number of children: Not on file    Years of education: Not on file    Highest education level: Not on file   Occupational History    Not on file   Tobacco Use    Smoking status: Former

## 2023-04-12 NOTE — DISCHARGE INSTRUCTIONS
Follow-up with your primary doctor in a week for reevaluation. Use Velcro wrist splint as needed but I do want you to get your wrist out of the Velcro splint several times a day to ensure that your wrist does not becoming stiff. Return to emergency department for any motor or sensory changes, redness around the joint, fevers or any new change or worsening symptoms. I want you to follow-up with your primary doctor in a week for reevaluation and for possible further imaging and orthopedic follow-up if pain continues. When she ice your wrist 20 minutes 3-4 times daily and elevate when able.

## 2023-04-18 ENCOUNTER — OFFICE VISIT (OUTPATIENT)
Dept: ENDOCRINOLOGY | Age: 43
End: 2023-04-18
Payer: COMMERCIAL

## 2023-04-18 DIAGNOSIS — E11.9 TYPE 2 DIABETES MELLITUS WITHOUT COMPLICATION, WITHOUT LONG-TERM CURRENT USE OF INSULIN (HCC): Primary | Chronic | ICD-10-CM

## 2023-04-18 PROCEDURE — 97803 MED NUTRITION INDIV SUBSEQ: CPT | Performed by: DIETITIAN, REGISTERED

## 2023-04-18 PROCEDURE — 3051F HG A1C>EQUAL 7.0%<8.0%: CPT | Performed by: DIETITIAN, REGISTERED

## 2023-04-18 NOTE — PROGRESS NOTES
Component Value Date    CHOL 120 12/07/2022    CHOL 214 (H) 05/25/2021    CHOL 206 (H) 12/26/2018     Lab Results   Component Value Date    TRIG 114 12/07/2022    TRIG 158 (H) 05/25/2021    TRIG 111 12/26/2018     Lab Results   Component Value Date    HDL 29 (L) 12/07/2022    HDL 38 (L) 05/25/2021    HDL 33 (L) 12/26/2018     Lab Results   Component Value Date    LDLCALC 68 12/07/2022    LDLCALC 144 (H) 05/25/2021    LDLCALC 151 (H) 12/26/2018     Lab Results   Component Value Date    LABVLDL 23 12/07/2022    LABVLDL 32 05/25/2021    LABVLDL 22 12/26/2018     No results found for: [facility]    Lab Results   Component Value Date    WBC 8.7 07/13/2022    HGB 15.6 07/13/2022    HCT 45.5 07/13/2022    MCV 84.5 07/13/2022     07/13/2022       Lab Results   Component Value Date    CREATININE 0.9 07/13/2022    BUN 14 07/13/2022     07/13/2022    K 4.6 07/13/2022     07/13/2022    CO2 22 07/13/2022       Diabetes Medications: Yes  Knows name and dose of prescribed medications Yes  Knows prescribed schedule for medicationsYes  Recent change in medication type/dosage: No  Stores  medications properlyYes  Comments:     Monitoring:   Has BG meter: Yes  Testing frequency: has CGM, using traditional monitor currently  Recent results: before 1st meal 98  Hypoglycemia? No    Anthropometric Measurements:   Wt:   Wt Readings from Last 3 Encounters:   04/12/23 210 lb (95.3 kg)   03/15/23 220 lb (99.8 kg)   03/08/23 218 lb 12.8 oz (99.2 kg)      BMI:   BMI Readings from Last 3 Encounters:   04/12/23 31.93 kg/m²   03/15/23 34.46 kg/m²   03/08/23 34.27 kg/m²     Patient's stated goal weight:   7% Weight loss goal weight:     Physical Activity History:   Physical activity: walking, coaching  Frequency of activity: most days  Duration of activity: 1-3 hours  Obstacles to activity: none    Sleep: using C-pap    Food and Nutrition History:   Nutrition Awareness/Previous DSMES: yes  Number of people in household:

## 2023-04-27 DIAGNOSIS — R42 DIZZINESS, NONSPECIFIC: ICD-10-CM

## 2023-04-27 DIAGNOSIS — I49.9 CARDIAC ARRHYTHMIA, UNSPECIFIED CARDIAC ARRHYTHMIA TYPE: Primary | ICD-10-CM

## 2023-06-19 ENCOUNTER — OFFICE VISIT (OUTPATIENT)
Dept: PRIMARY CARE CLINIC | Age: 43
End: 2023-06-19
Payer: COMMERCIAL

## 2023-06-19 VITALS
WEIGHT: 214 LBS | OXYGEN SATURATION: 97 % | BODY MASS INDEX: 32.54 KG/M2 | TEMPERATURE: 97.6 F | HEART RATE: 93 BPM | DIASTOLIC BLOOD PRESSURE: 78 MMHG | SYSTOLIC BLOOD PRESSURE: 110 MMHG

## 2023-06-19 DIAGNOSIS — E11.9 TYPE 2 DIABETES MELLITUS WITHOUT COMPLICATION, WITHOUT LONG-TERM CURRENT USE OF INSULIN (HCC): Primary | ICD-10-CM

## 2023-06-19 LAB — HBA1C MFR BLD: 6.8 %

## 2023-06-19 PROCEDURE — 83037 HB GLYCOSYLATED A1C HOME DEV: CPT

## 2023-06-19 PROCEDURE — 3044F HG A1C LEVEL LT 7.0%: CPT

## 2023-06-19 PROCEDURE — 99213 OFFICE O/P EST LOW 20 MIN: CPT

## 2023-06-19 RX ORDER — LISINOPRIL 5 MG/1
2.5 TABLET ORAL DAILY
Qty: 15 TABLET | Refills: 2 | Status: SHIPPED | OUTPATIENT
Start: 2023-06-19

## 2023-06-19 ASSESSMENT — ENCOUNTER SYMPTOMS
COUGH: 0
SHORTNESS OF BREATH: 0
VOMITING: 0
NAUSEA: 0

## 2023-06-19 NOTE — PROGRESS NOTES
[address] [facility]  2023    Gilda Hardwick (:  1980) is a 37 y.o. male, here for evaluation of the following medical concerns:    Chief Complaint   Patient presents with    Diabetes        ASSESSMENT/ PLAN  1. Type 2 diabetes mellitus without complication, without long-term current use of insulin (HCC)  - No changes to medication regimen. - lisinopril (PRINIVIL;ZESTRIL) 5 MG tablet; Take 0.5 tablets by mouth daily  Dispense: 15 tablet; Refill: 2  - POCT glycosylated hemoglobin (Hb A1C)       - We will do labs at his next visit. Return in about 6 months (around 2023) for Diabetes; fasting labs. Lab Results   Component Value Date    LABA1C 6.8 2023    LABA1C 7.0 03/15/2023    LABA1C 9.1 2022     Lab Results   Component Value Date    LABMICR 2.00 (H) 2022    LDLCALC 68 2022    CREATININE 0.9 2022        HPI  Here today for a routine diabetic follow-up. A1c was 7.0 at last visit. He is at 6.8 today. Current medications include: metformin 1000mg BID and glipizide 5mg daily. He denies any side effects from the medication; no GI issues. Denies any significant episodes of hypoglycemia. States that his sugar still drops \"every once in awhile\" depending on what he eats and how active he is being. Not too much of an issue. Never <70 mg/dL. He continues to use the Dexcom CGM. Last saw Tierney Dodge, [facility] in April. Was told he could follow up PRN. Weight is stable. Taking 2.5mg lisinopril daily. Tolerating well. Denies dizziness, leg swelling, palpitations, CP, etc.    CPAP for [facility]. Tolerating well. ROS  Review of Systems   Constitutional:  Negative for chills, fatigue and fever. Respiratory:  Negative for cough and shortness of breath. Cardiovascular:  Negative for chest pain. Gastrointestinal:  Negative for nausea and vomiting. Endocrine: Negative for polydipsia, polyphagia and polyuria. Genitourinary:  Negative for frequency.

## 2023-06-26 DIAGNOSIS — E11.9 TYPE 2 DIABETES MELLITUS WITHOUT COMPLICATION, WITHOUT LONG-TERM CURRENT USE OF INSULIN (HCC): ICD-10-CM

## 2023-06-26 RX ORDER — GLIPIZIDE 5 MG/1
TABLET ORAL
Qty: 90 TABLET | Refills: 2 | Status: SHIPPED | OUTPATIENT
Start: 2023-06-26

## 2023-07-26 ENCOUNTER — OFFICE VISIT (OUTPATIENT)
Dept: CARDIOLOGY CLINIC | Age: 43
End: 2023-07-26
Payer: COMMERCIAL

## 2023-07-26 VITALS
SYSTOLIC BLOOD PRESSURE: 134 MMHG | WEIGHT: 218 LBS | HEIGHT: 68 IN | DIASTOLIC BLOOD PRESSURE: 74 MMHG | OXYGEN SATURATION: 98 % | HEART RATE: 50 BPM | BODY MASS INDEX: 33.04 KG/M2

## 2023-07-26 DIAGNOSIS — R94.31 ABNORMAL ECG: ICD-10-CM

## 2023-07-26 DIAGNOSIS — I49.3 FREQUENT PVCS: Primary | ICD-10-CM

## 2023-07-26 DIAGNOSIS — G47.33 OSA (OBSTRUCTIVE SLEEP APNEA): ICD-10-CM

## 2023-07-26 DIAGNOSIS — E66.9 OBESITY (BMI 30.0-34.9): ICD-10-CM

## 2023-07-26 DIAGNOSIS — I49.3 PVC (PREMATURE VENTRICULAR CONTRACTION): Chronic | ICD-10-CM

## 2023-07-26 PROCEDURE — 93000 ELECTROCARDIOGRAM COMPLETE: CPT | Performed by: INTERNAL MEDICINE

## 2023-07-26 PROCEDURE — 99214 OFFICE O/P EST MOD 30 MIN: CPT | Performed by: INTERNAL MEDICINE

## 2023-07-26 ASSESSMENT — ENCOUNTER SYMPTOMS
STRIDOR: 0
RIGHT EYE: 0
WHEEZING: 0
SHORTNESS OF BREATH: 0
SNORING: 0
HEMATEMESIS: 0
LEFT EYE: 0
HEMATOCHEZIA: 0

## 2023-07-26 NOTE — PATIENT INSTRUCTIONS
Plan:     Recommend 1 week cardiac event monitor-to be read by me  -discuss with insurance to ensure proper coverage ([employer] or [employer])   Continue current cardiac medication as prescribed  Follow up with me in early December

## 2023-07-26 NOTE — PROGRESS NOTES
family. Patient states he is having an at home sleep study in [place]. 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 Patient denies current edema, chest pain, shortness of breath, palpitations, dizziness or syncope. He is taking all cardiac medications as prescribed and tolerates them well. He denies any recent issues with bleeding or bruising. The patient underwent a sleep study at home on 10/17/2022 that demonstrated mild obstructive sleep apnea. Office Visit ([address] [place] [address], 11/04/2022)  Today he presents with his wife for follow up. He states that he completed an at home sleep study. He states that he has to have a test completed in the sleep medicine clinic this month. He states that he is not experiencing fatigue. He states that when he is weighing himself at home, he has lost a few pounds. Patient denies current edema, chest pain, shortness of breath, palpitations, dizziness or syncope. Patient is taking all cardiac medications as prescribed and tolerates them well. He denies any recent issues with bleeding or bruising. Patient wore a cardiac event monitor from 2/10/2023 to 2/12/2023 which demonstrated predominately SR with an average HR of 84 (). PAC burden 0%, PVC burden 16.2% with couplets and triplets. Office Visit ([address] [place] [address], 3/8/2023): Today he presents for follow up visit. He continues to feel well. He reports he has been compliant with CPAP since 11/2022. He reports using CPAP 6-8 hours a night. He feels more refreshed when he wakes up and has noticed less fatigue during the day. Patient denies current edema, chest pain, shortness of breath, palpitations, dizziness or syncope. Patient is taking all cardiac medications as prescribed and tolerates them well. No leg swelling. He reports minimal caffeine usage. He denies alcohol consumption. He denies tobacco usage. Office Visit ([address] [place] [address], 7/26/2023): Today he presents for a follow up.  He remains

## 2023-08-01 ENCOUNTER — TELEPHONE (OUTPATIENT)
Dept: CARDIOLOGY CLINIC | Age: 43
End: 2023-08-01

## 2023-08-01 NOTE — TELEPHONE ENCOUNTER
Rylee Wallace from [facility] would like to know if there are other options for monitors besides [employer] and [employer]. She states neither are covered under insurance. If there are no other options for monitors Rylee Wallace states Elena Almonte would be better perez wise for pt. Please advise.

## 2023-08-02 NOTE — TELEPHONE ENCOUNTER
I attempted to call the patient to see if Polina or Raegan French monitoring would be covered. LMOV.

## 2023-08-03 NOTE — TELEPHONE ENCOUNTER
Matias Dela Cruz from [facility] called and I gave her the name of company's [employer] and [employer]. She said she would check on these companies.  Ty

## 2023-08-18 DIAGNOSIS — E11.9 TYPE 2 DIABETES MELLITUS WITHOUT COMPLICATION, WITHOUT LONG-TERM CURRENT USE OF INSULIN (HCC): ICD-10-CM

## 2023-08-18 RX ORDER — METFORMIN HYDROCHLORIDE 500 MG/1
TABLET, EXTENDED RELEASE ORAL
Qty: 360 TABLET | Refills: 1 | Status: SHIPPED | OUTPATIENT
Start: 2023-08-18

## 2023-08-18 NOTE — TELEPHONE ENCOUNTER
Last ov 07/13/2022:    Plan:      1. Now that patient has been on beta-blocker for approximately one month, will repeat 48-hour Holter monitor to re-assess PVC burden, and if remains high, plan to refer to EP for further evaluation and consideration of future RFCA. 2. Obtain TTE for complete assessment of cardiac structure and function. 3. Check CBC, CMP, magnesium level, and TSH. 4. Refer to pulmonology for formal sleep study. Presence of sleep apnea can increase risk for development of cardiac arrhythmias, pulmonary hypertension, and right heart failure. 5. Continue metoprolol succinate 50 mg daily and lisinopril 2.5 mg daily. 6. Start atorvastatin 40 mg daily. 7. Continue to encourage avoidance of tobacco products.   8. Follow-up with me in one month    LIPID 12/2022    Front- pt needs appt

## 2023-08-21 NOTE — TELEPHONE ENCOUNTER
08/21 2nd attempt, called pt @ [phone] and lvm to return call and schedule w/ km, mailing letter

## 2023-08-31 RX ORDER — ATORVASTATIN CALCIUM 40 MG/1
TABLET, FILM COATED ORAL
Qty: 30 TABLET | Refills: 0 | Status: SHIPPED | OUTPATIENT
Start: 2023-08-31

## 2023-09-22 DIAGNOSIS — E11.9 TYPE 2 DIABETES MELLITUS WITHOUT COMPLICATION, WITHOUT LONG-TERM CURRENT USE OF INSULIN (HCC): ICD-10-CM

## 2023-09-22 RX ORDER — LISINOPRIL 5 MG/1
2.5 TABLET ORAL DAILY
Qty: 15 TABLET | Refills: 2 | Status: SHIPPED | OUTPATIENT
Start: 2023-09-22

## 2023-09-22 NOTE — TELEPHONE ENCOUNTER
Last seen 6/19/23  Last filled 6/19/23    Return in about 6 months (around 12/19/2023) for Diabetes; fasting labs.

## 2023-09-26 ENCOUNTER — OFFICE VISIT (OUTPATIENT)
Dept: PRIMARY CARE CLINIC | Age: 43
End: 2023-09-26
Payer: COMMERCIAL

## 2023-09-26 VITALS
DIASTOLIC BLOOD PRESSURE: 60 MMHG | WEIGHT: 219 LBS | HEART RATE: 78 BPM | BODY MASS INDEX: 33.3 KG/M2 | OXYGEN SATURATION: 96 % | SYSTOLIC BLOOD PRESSURE: 122 MMHG | TEMPERATURE: 98.5 F

## 2023-09-26 DIAGNOSIS — J01.90 ACUTE BACTERIAL SINUSITIS: Primary | ICD-10-CM

## 2023-09-26 DIAGNOSIS — B96.89 ACUTE BACTERIAL SINUSITIS: Primary | ICD-10-CM

## 2023-09-26 PROCEDURE — 99213 OFFICE O/P EST LOW 20 MIN: CPT

## 2023-09-26 RX ORDER — AMOXICILLIN AND CLAVULANATE POTASSIUM 875; 125 MG/1; MG/1
1 TABLET, FILM COATED ORAL 2 TIMES DAILY
Qty: 20 TABLET | Refills: 0 | Status: SHIPPED | OUTPATIENT
Start: 2023-09-26 | End: 2023-10-06

## 2023-09-26 ASSESSMENT — ENCOUNTER SYMPTOMS
SORE THROAT: 1
GASTROINTESTINAL NEGATIVE: 1
COUGH: 1
SINUS PAIN: 1
WHEEZING: 0
SINUS PRESSURE: 1
CHEST TIGHTNESS: 0
SHORTNESS OF BREATH: 0
TROUBLE SWALLOWING: 0

## 2023-09-28 DIAGNOSIS — I49.3 PVC (PREMATURE VENTRICULAR CONTRACTION): ICD-10-CM

## 2023-09-28 RX ORDER — DILTIAZEM HYDROCHLORIDE 180 MG/1
CAPSULE, COATED, EXTENDED RELEASE ORAL DAILY
Qty: 90 CAPSULE | Refills: 1 | Status: SHIPPED | OUTPATIENT
Start: 2023-09-28

## 2023-10-03 ENCOUNTER — TELEPHONE (OUTPATIENT)
Dept: CARDIOLOGY CLINIC | Age: 43
End: 2023-10-03

## 2023-10-03 PROCEDURE — 93246 EXT ECG>7D<15D RECORDING: CPT | Performed by: INTERNAL MEDICINE

## 2023-10-06 NOTE — TELEPHONE ENCOUNTER
10/06 called pt @ [phone] and lvm to return call and schedule annual [facility] med refill appt

## 2023-10-06 NOTE — TELEPHONE ENCOUNTER
10/6 Called [phone].  [facility] for pt to call and schedule appt with [facility] for medication refill

## 2023-10-06 NOTE — TELEPHONE ENCOUNTER
7/26/2023 Emilee  7/13/2022 [facility]  12/7/2022 Lipid, hepatic      Pt needs yearly [facility] appt for approvals of medication, please contact pt for an appt.

## 2023-10-10 RX ORDER — ATORVASTATIN CALCIUM 40 MG/1
TABLET, FILM COATED ORAL
Qty: 30 TABLET | Refills: 0 | Status: SHIPPED | OUTPATIENT
Start: 2023-10-10

## 2023-10-10 NOTE — TELEPHONE ENCOUNTER
10/10 Third Attempt: Called [phone]. M for pt to call and schedule annual appt with [facility] for medication refill.  Will send letter

## 2023-10-12 PROCEDURE — 93248 EXT ECG>7D<15D REV&INTERPJ: CPT | Performed by: INTERNAL MEDICINE

## 2023-10-24 ENCOUNTER — NURSE ONLY (OUTPATIENT)
Dept: PRIMARY CARE CLINIC | Age: 43
End: 2023-10-24
Payer: COMMERCIAL

## 2023-10-24 DIAGNOSIS — Z23 NEED FOR INFLUENZA VACCINATION: Primary | ICD-10-CM

## 2023-10-24 PROCEDURE — 90674 CCIIV4 VAC NO PRSV 0.5 ML IM: CPT

## 2023-10-24 PROCEDURE — 90471 IMMUNIZATION ADMIN: CPT

## 2023-10-25 DIAGNOSIS — R42 DIZZINESS, NONSPECIFIC: Primary | ICD-10-CM

## 2023-11-01 ENCOUNTER — TELEPHONE (OUTPATIENT)
Dept: CARDIOLOGY CLINIC | Age: 43
End: 2023-11-01

## 2023-11-01 NOTE — TELEPHONE ENCOUNTER
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 Please advise.

## 2023-11-01 NOTE — TELEPHONE ENCOUNTER
Spoke with patient. 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 [facility] ok to write letter?  LOV 7/2023

## 2023-11-01 NOTE — TELEPHONE ENCOUNTER
I attempted to call the patient to relay cardiac event monitor results. Monitor overall showed no sustained arrhythmias, some brief atrial runs and a low burden (amount) of PVCs: 0.09%. Please tell patient to follow up as scheduled. Thanks.

## 2023-11-02 NOTE — TELEPHONE ENCOUNTER
Nikolai Borges MD  You 17 hours ago (10:52 PM)     That would be fine from an EP cardiology standpoint      Created letter and sent to patient's mychart.

## 2023-11-06 NOTE — TELEPHONE ENCOUNTER
11/06 called pt @ [phone] and lvm to return call and schedule annual [facility] appt

## 2023-11-07 NOTE — TELEPHONE ENCOUNTER
11/07 called and spoke w/ pt and he was asking if [facility] could take over and refill lipitor due to not seeing [facility] since he was referred to [facility] and he works out of town, please advise

## 2023-11-08 NOTE — TELEPHONE ENCOUNTER
[facility] does not manage statins as it is not a medication for heart rate/rhythm and would not look at lipids, this is not his speciality. However patient's PCP can manage if patient is no longer following with [facility]. Thanks.

## 2023-11-14 RX ORDER — ATORVASTATIN CALCIUM 40 MG/1
40 TABLET, FILM COATED ORAL DAILY
Qty: 30 TABLET | Refills: 0 | OUTPATIENT
Start: 2023-11-14

## 2023-12-06 ENCOUNTER — NURSE ONLY (OUTPATIENT)
Dept: PRIMARY CARE CLINIC | Age: 43
End: 2023-12-06
Payer: COMMERCIAL

## 2023-12-06 DIAGNOSIS — E11.9 TYPE 2 DIABETES MELLITUS WITHOUT COMPLICATION, WITHOUT LONG-TERM CURRENT USE OF INSULIN (HCC): Primary | ICD-10-CM

## 2023-12-06 LAB — HBA1C MFR BLD: 9 %

## 2023-12-06 PROCEDURE — 83036 HEMOGLOBIN GLYCOSYLATED A1C: CPT

## 2023-12-06 NOTE — PROGRESS NOTES
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 Test done and result sent to PCP.

## 2024-01-12 ENCOUNTER — TELEPHONE (OUTPATIENT)
Dept: PULMONOLOGY | Age: 44
End: 2024-01-12

## 2024-01-12 ENCOUNTER — OFFICE VISIT (OUTPATIENT)
Dept: PULMONOLOGY | Age: 44
End: 2024-01-12
Payer: COMMERCIAL

## 2024-01-12 VITALS
HEIGHT: 67 IN | RESPIRATION RATE: 16 BRPM | SYSTOLIC BLOOD PRESSURE: 134 MMHG | WEIGHT: 223.2 LBS | OXYGEN SATURATION: 97 % | HEART RATE: 88 BPM | DIASTOLIC BLOOD PRESSURE: 70 MMHG | BODY MASS INDEX: 35.03 KG/M2

## 2024-01-12 DIAGNOSIS — G47.33 OSA (OBSTRUCTIVE SLEEP APNEA): Primary | ICD-10-CM

## 2024-01-12 PROCEDURE — 99213 OFFICE O/P EST LOW 20 MIN: CPT

## 2024-01-12 ASSESSMENT — SLEEP AND FATIGUE QUESTIONNAIRES
HOW LIKELY ARE YOU TO NOD OFF OR FALL ASLEEP WHILE SITTING INACTIVE IN A PUBLIC PLACE: 0
NECK CIRCUMFERENCE (INCHES): 17.5
HOW LIKELY ARE YOU TO NOD OFF OR FALL ASLEEP WHILE SITTING AND READING: 0
HOW LIKELY ARE YOU TO NOD OFF OR FALL ASLEEP IN A CAR, WHILE STOPPED FOR A FEW MINUTES IN TRAFFIC: 0
HOW LIKELY ARE YOU TO NOD OFF OR FALL ASLEEP WHILE SITTING QUIETLY AFTER LUNCH WITHOUT ALCOHOL: 0
ESS TOTAL SCORE: 4
HOW LIKELY ARE YOU TO NOD OFF OR FALL ASLEEP WHILE LYING DOWN TO REST IN THE AFTERNOON WHEN CIRCUMSTANCES PERMIT: 2
HOW LIKELY ARE YOU TO NOD OFF OR FALL ASLEEP WHEN YOU ARE A PASSENGER IN A CAR FOR AN HOUR WITHOUT A BREAK: 1
HOW LIKELY ARE YOU TO NOD OFF OR FALL ASLEEP WHILE WATCHING TV: 1
HOW LIKELY ARE YOU TO NOD OFF OR FALL ASLEEP WHILE SITTING AND TALKING TO SOMEONE: 0

## 2024-01-12 NOTE — TELEPHONE ENCOUNTER
Faxed full face mask order, nasal mask order, nasal pillows mask order, CR, and ov notes to Ilana at [phone] via [employer].

## 2024-01-12 NOTE — PROGRESS NOTES
?  PREPARTICIPATION PHYSICAL EVALUATION  MEDICAL ELIGIBILITY FORM  [] Medically eligible for all sports without restrictions   [] Medically eligible for all sports without restriction with recommendations for further evaluation or treatment     []Medically eligible for certain sports     [x] Not medically eligible pending further evaluation   [] Not medically eligible for any sports    Recommendations:   Awaiting EKG     I have examined the student named on this form and completed the preparticipation physical evaluation. The athlete does not have apparent clinical contraindications to practice and can participate in the sport(s) as outlined on this form. A copy of the physical examination findings are on record in my office and can be made available to the school at the request of the parents. If conditions  arise after the athlete has been cleared for participation, the physician may rescind the medical eligibility until the problem is resolved and the potential consequences are completely explained to the athlete (and parents or guardians).    Name of healthcare professional (print or type: Kimi Perales, JUSTINE Date: 3/3/2025     Address: [address] [place] [address], [id number] Phone: Dept: [phone]      Signature of health care professional:      SHARED EMERGENCY INFORMATION  Allergies: has No Known Allergies.    Medications: Liliya currently has no medications in their medication list.     Other Information:      Emergency contacts:   Name Relationship Lgl Grd Work Phone Home Phone Mobile Phone   1. ARDEN WINTERS Father   [phone]   2. RENETTA WINTERS Mother  [phone]         Supplemental COVID?19 questions  1. Have you had any of the following symptoms in the past 14 days?  (Place Check Edmundo)                a)      Fever or chills Yes  No    b)      Cough Yes  No    c)       Shortness of breath or difficulty breathing Yes  No    d)      Fatigue Yes  No     e)      Muscle or body aches Yes  No    f)       Headache Yes  No    g)      New loss of taste or smell Yes  No    h)      Sore throat Yes  No    i)       Congestion or runny nose Yes  No    j)       Nausea or vomiting Yes  No    k)      Diarrhea Yes  No    l)       Date symptoms started Yes  No    m)    Date symptoms resolved Yes  No   2. Have you ever had a positive text for COVID-19?   Yes                            No              If yes:        Date of Test ____________      Were you tested because you had symptoms? Yes  No              If yes:        a)       Date symptoms started ____________     b)      Date symptoms resolved  ____________     c)      Were you hospitalized? Yes No    d)      Did you have fever > 100.4 F Yes No                 If yes, how many days did your fever last? ____________     e)      Did you have muscle aches, chills, or lethargy? Yes No    f)       Have you had the vaccine? Yes No        Were you tested because you were exposed to someone with COVID-19, but you did not have any symptoms?  Yes No   3. Has anyone living in your household had any of the following symptoms or tested positive for COVID-19 in the past 14 days? Yes   No                                       If yes, which symptoms [] Fever or chills    []Muscle or body aches   []Nausea or vomiting        [] Sore throat     [] Headache  [] Shortness of breath or difficulty breathing   [] New loss of taste or smell   [] Congestion or runny nose   [] Cough     [] Fatigue     [] Diarrhea   4. Have you been within 6 feet for more than 15 minutes of someone with COVID-19   In the past 14 days? Yes      No                   If yes: date(s) of exposure                  5. Are you currently waiting on results from a recent COVID test?     Yes    No         Sources:  Interim Guidance on the Preparticipation Physical Examinatio... : Clinical Journal of Sport Medicine (lww.com)  Supplemental COVID?19 Questions (lww.com)  COVID?19    Sleep hygiene & CPAP cleaning tips discussed & provided.  Supply schedule.    Patient has been advised: no driving while sleepy; weight loss recommended.  Pathophysiology & complications of untreated SINAI have been discussed.  Systemic benefits of CPAP therapy have been discussed.   F/U in 1 year    Total time spent on this visit was 20 minutes; this includes review of prior notes and/or studies, direct patient contact, and coordination of care.     Interim Guidance: Return to Sports and Physical Activity (aap.org)      ?  PREPARTICIPATION PHYSICAL EVALUATION   HISTORY FORM  Note: Complete and sign this form (with your parents if younger than 18) before your appointment.  Name: Liliya Shane YOB: 2011   Date of Examination: 3/3/2025 Sport(s):    Sex assigned at birth: female How do you identify your gender? female     List past and current medical conditions:  has no past medical history of Asthma (HCC).   Have you ever had surgery? If yes, list all past surgical procedures.  has no past surgical history on file.   Medicines and supplements: List all current prescriptions, over-the-counter medicines, and supplements (herbal and nutritional). Liliya does not currently have medications on file.   Do you have any allergies? If yes, please list all your allergies (ie, medicines, pollens, food, stinging insects). has No Known Allergies.       Patient Health Questionnaire Version 4 (PHQ-4)  Over the last 2 weeks, how often have you been bothered by any of the following problems? ([place] response.)      Not at all Several days Over half the days Nearly  every day   Feeling nervous, anxious, or on edge 0 1 2 3   Not being able to stop or control worrying 0 1 2 3   Little interest or pleasure in doing things 0 1 2 3   Feeling down, depressed, or hopeless 0 1 2 3     (A sum of >=3 is considered positive on either subscale [questions 1 and 2, or questions 3 and 4] for screening purposes.)       GENERAL QUESTIONS  (Explain “Yes” answers at the end of this form.  [place] questions if you don’t know the answer.) Yes No   Do you have any concerns that you would like to discuss with your provider? [] [x]   Has a provider ever denied or restricted your participation in sports for any reason? [] [x]   Do you have any ongoing medical issues or recent illnesses?  [] [x]   HEART HEALTH QUESTIONS ABOUT YOU Yes No   Have you ever passed out or nearly passed out  during or after exercise? [] [x]   Have you ever had discomfort, pain, tightness, or pressure in your chest during exercise? [] [x]   Does your heart ever race, flutter in your chest, or skip beats (irregular beats) during exercise? [x] []   Has a doctor ever told you that you have any heart problems? [] [x]   8.     Has a doctor ever requested a test for your heart? For         example, electrocardiography (ECG) or         echocardiography. [] [x]    HEART HEALTH QUESTIONS ABOUT YOU        (CONTINUED) Yes No   9.  Do you get light -headed or feel shorter of breath      than your friends during exercise? [] [x]   10.  Have you ever had a seizure? [] [x]   HEART HEALTH QUESTIONS ABOUT YOUR FAMILY     Yes No   11. Has any family member or relative  of heart           problems or had an unexpected or unexplained        sudden death before age 35 years (including             drowning or unexplained car crash)? [] [x]   12. Does anyone in your family have a genetic heart           problem  like hypertrophic cardiomyopathy                   (HCM), Marfan syndrome, arrhythmogenic right           ventricular cardiomyopathy (ARVC), long QT               Brugada syndrome, or a catecholaminergic              polymorphic ventricular tachycardia (CPVT)? [] [x]   13. Has anyone in your family had a pacemaker or      an implanted defibrillation before age 35? [] [x]                BONE AND JOINT QUESTIONS Yes No   14.   Have you ever had a stress fracture or an injury to a bone, muscle, ligament, joint, or tendon that caused you to miss a practice or game? [] [x]   15.   Do you have a bone, muscle, ligament, or joint injury that bothers you? [] [x]   MEDICAL QUESTIONS Yes No   16.   Do you cough, wheeze, or have difficulty breathing during or after exercise? [] [x]   17.   Are you missing a kidney, an eye, a testicle (males), your spleen, or any other organ? [] [x]   18.   Do you have groin or testicle pain or a painful bulge  or hernia in the groin area? [] [x]   19.   Do you have any recurring skin rashes or rashes that come and go, including herpes or methicillin-resistant Staphylococcus aureus (MRSA)? [] [x]   20.   Have you had a concussion or head injury that caused confusion, a prolonged headache, or memory problems?  []     [x]       21.   Have you ever had numbness, had tingling, had weakness in your arms or legs, or been unable to move your arms or legs after being hit or falling? [] [x]   22.   Have you ever become ill while exercising in the heat? [] [x]   23.   Do you or does someone in your family have sickle cell trait or disease? [] [x]   24.   Have you ever had or do you have any prob- lems with your eyes or vision? [] [x]    MEDICAL  QUESTIONS  (CONTINUED  ) Yes No   25.    Do you worry about  your weight? [x] []   26. Are you trying to or has anyone recommended that you gain or lose  Weight? [] [x]   27. Are you on a special diet or do you avoid certain types of foods or food groups? [] [x]   28.  Have you ever had an eating disorder?                 NO CLEARA [] [x]   FEMALES ONLY Yes No   29.  Have you ever had a menstrual period? [x] []   30. How old were you when you had your first menstrual period? 12yo     Explain \"Yes\" answers here.      Reports heart speeding up and slowing down described as \"feels different.\"  Lasting \"a few seconds\" less than a minute.  Has occurred 3 times this year.      I hereby state that, to the best of my knowledge, my answers to the questions on this form are complete and correct.    Signature of athlete:____________________________________________________________________________________________  Signature of parent or gaurdian:__________________________________________________________________________________     Date: 03/03/25    .[email or web address]      ?  PREPARTICIPATION PHYSICAL EVALUATION   PHYSICAL EXAMINATION FORM  Name: Liliya Shane          YOB: 2011  PHYSICIAN  REMINDERS  Consider additional questions on more-sensitive issues.  Do you feel stressed out or under a lot of pressure?  Do you ever feel sad, hopeless, depressed, or anxious?  Do you feel safe at your home or residence?  During the past 30 days, did you use chewing tobacco, snuff, or dip?  Do you drink alcohol or use any other drugs?  Have you ever taken anabolic steroids or used any other performance-enhancing supplement?  Have you ever taken any supplements to help you gain or lose weight or improve your performance?  Do you wear a seat belt, use a helmet, and use condoms?  Consider reviewing questions on cardiovascular symptoms (Q4-Q13 of History Form).    EXAMINATION   Height: 5' (1.524 m) (3/3/2025  4:23 PM)     Weight: 138 lb (62.6 kg) (3/3/2025  4:23 PM)     BP: 100/60 (3/3/2025  4:23 PM)     Pulse: 103 (3/3/2025  4:23 PM)   Vision: R 20/20      L 20/20  Corrected: [] Y [x]  N   MEDICAL NORMAL ABNORMAL FINDINGS   Appearance  Marfan stigmata (kyphoscoliosis, high-arched palate, pectus excavatum, arachnodactyly, hyperlaxity, myopia, mitral valve prolapse [MVP], and aortic insufficiency)   [x]    []       Eyes, ears, nose, and throat  Pupils equal  Hearing   [x]  []     Lymph nodes   [x]  []   Hearta  Murmurs (auscultation standing, auscultation supine, and ± Valsalva maneuver)   [x]  []   Lungs   [x]  []   Abdomen   [x]  []   Skin  Herpes simplex virus (HSV), lesions suggestive of methicillin-resistant Staphylococcus aureus (MRSA), or tinea corporis   [x]  []   Neurological   [x]  []   MUSCULOSKELETAL NORMAL ABNORMAL FINDINGS   Neck   [x]  []    Back   [x]  []   Shoulder and arm   [x]  []     Elbow and forearm   [x]  []     Wrist, hand, and fingers   [x]  []     Hip and thigh   [x]  []   Knee   [x]  []     Leg and ankle   [x]  []   Foot and toes   [x]  []   Functional  Double-leg squat test, single-leg squat test, and box drop or step drop test   [x]  []   Consider electrocardiography (ECG), echocardiography,  referral to a cardiologist for abnormal cardiac history or examination findings, or a combination of those.  Name of healthcare professional (print or type: JUSTINE Faria Date: 3/3/2025     Address: [address] [place] [address], [id number] Phone: Dept: [phone]   Signature:

## 2024-01-12 NOTE — PATIENT INSTRUCTIONS
It was good to see you again and take care Dima!  Let us know if you need anything.   -Ashleigh      Never drive a car or operate a motorized vehicle while drowsy or sleepy.       Sleep Hygiene... Important practices for better sleep:    Avoid naps. This will ensure you are sleepy at bedtime.  If you have to take a nap, sleep less than 1 hour, before 3 pm.  SCHEDULE: Have a fixed bedtime and awakening time. The human body thrives on routines. Only deviate from these set sleep times about 1-2 hours on the weekends (more than this will start altering your internal clock). You will feel better keeping a regular sleep cycle and giving your body a dependable pattern, even (especially) if you are retired or not working.   Use light to train your biological clock: When you get up in the morning, exposure yourself to bright lights. When preparing for bed, dim the lights and avoid exposure to screens.  The blue light from electronic screens tells our brain that it is time to be awake; it inhibits melatonin production which stops our brain from helping us get to sleep.   Go to bed only when sleepy; this reduces the time you are awake in bed (which can lead to frustration and negative thoughts about sleep). If you can't fall asleep within 15-30 minutes, get up and do something boring until you feel sleepy again. Sit quietly in the dark or read the warranty on your refrigerator. Don't expose yourself to bright light during this time (especially screens), which would cue your brain that it is time to wake up.   Regular exercise is recommended to help you deepen your sleep (and MANY other reasons), but timing is important--aim to exercise in the morning or early afternoon, not within 4-6 hours of your bedtime.   Only use your bed for sleeping & intimacy. Do not use your bed as an office, workroom or recreation room. Let your mind/body \"know\" that the bed is associated with sleeping.   Develop sleep rituals. Give your body

## 2024-01-18 ENCOUNTER — OFFICE VISIT (OUTPATIENT)
Dept: PRIMARY CARE CLINIC | Age: 44
End: 2024-01-18
Payer: COMMERCIAL

## 2024-01-18 VITALS
TEMPERATURE: 97.2 F | BODY MASS INDEX: 34.46 KG/M2 | WEIGHT: 220 LBS | DIASTOLIC BLOOD PRESSURE: 74 MMHG | OXYGEN SATURATION: 97 % | HEART RATE: 72 BPM | SYSTOLIC BLOOD PRESSURE: 104 MMHG

## 2024-01-18 DIAGNOSIS — E11.65 TYPE 2 DIABETES MELLITUS WITH HYPERGLYCEMIA, WITHOUT LONG-TERM CURRENT USE OF INSULIN (HCC): Primary | ICD-10-CM

## 2024-01-18 DIAGNOSIS — E78.2 MIXED HYPERLIPIDEMIA: ICD-10-CM

## 2024-01-18 DIAGNOSIS — E66.9 CLASS 1 OBESITY WITHOUT SERIOUS COMORBIDITY WITH BODY MASS INDEX (BMI) OF 34.0 TO 34.9 IN ADULT, UNSPECIFIED OBESITY TYPE: ICD-10-CM

## 2024-01-18 PROBLEM — E11.9 TYPE 2 DIABETES MELLITUS WITHOUT COMPLICATION, WITHOUT LONG-TERM CURRENT USE OF INSULIN (HCC): Chronic | Status: RESOLVED | Noted: 2021-06-01 | Resolved: 2024-01-18

## 2024-01-18 PROCEDURE — 99214 OFFICE O/P EST MOD 30 MIN: CPT

## 2024-01-18 RX ORDER — ATORVASTATIN CALCIUM 40 MG/1
40 TABLET, FILM COATED ORAL DAILY
Qty: 90 TABLET | Refills: 1 | Status: SHIPPED | OUTPATIENT
Start: 2024-01-18

## 2024-01-18 RX ORDER — FLASH GLUCOSE SENSOR
1 KIT MISCELLANEOUS
Qty: 2 EACH | Refills: 11 | Status: SHIPPED | OUTPATIENT
Start: 2024-01-18 | End: 2024-01-18 | Stop reason: CLARIF

## 2024-01-18 ASSESSMENT — ENCOUNTER SYMPTOMS
VOMITING: 0
NAUSEA: 0
COUGH: 0
SHORTNESS OF BREATH: 0

## 2024-01-18 ASSESSMENT — PATIENT HEALTH QUESTIONNAIRE - PHQ9
SUM OF ALL RESPONSES TO PHQ QUESTIONS 1-9: 0
1. LITTLE INTEREST OR PLEASURE IN DOING THINGS: 0
SUM OF ALL RESPONSES TO PHQ QUESTIONS 1-9: 0
SUM OF ALL RESPONSES TO PHQ QUESTIONS 1-9: 0
SUM OF ALL RESPONSES TO PHQ9 QUESTIONS 1 & 2: 0
2. FEELING DOWN, DEPRESSED OR HOPELESS: 0
SUM OF ALL RESPONSES TO PHQ QUESTIONS 1-9: 0

## 2024-01-18 NOTE — PROGRESS NOTES
[facility]  2024    Dima Hagan (:  1980) is a 43 y.o. male, here for evaluation of the following medical concerns:    Chief Complaint   Patient presents with    Diabetes        ASSESSMENT/ PLAN  1. Type 2 diabetes mellitus with hyperglycemia, without long-term current use of insulin (HCC)  - Microalbumin / Creatinine Urine Ratio  - Comprehensive Metabolic Panel  - Hemoglobin A1C  - Lipid Panel  - CBC with Auto Differential  - Diabetic Foot Exam  - atorvastatin (LIPITOR) 40 MG tablet; Take 1 tablet by mouth daily  Dispense: 90 tablet; Refill: 1  - Continuous Blood Gluc  (FREESTYLE RISA 2 READER) ROSA; 1 each by Does not apply route daily  Dispense: 1 each; Refill: 0  - Continuous Blood Gluc Sensor (FREESTYLE RISA 14 DAY SENSOR) MISC; 1 each by Does not apply route every 14 days  Dispense: 2 each; Refill: 11    2. Class 1 obesity without serious comorbidity with body mass index (BMI) of 34.0 to 34.9 in adult, unspecified obesity type  - Comprehensive Metabolic Panel  - Hemoglobin A1C  - Lipid Panel  - CBC with Auto Differential    3. Mixed hyperlipidemia  - Comprehensive Metabolic Panel  - Lipid Panel  - CBC with Auto Differential  - atorvastatin (LIPITOR) 40 MG tablet; Take 1 tablet by mouth daily  Dispense: 90 tablet; Refill: 1       Return in about 3 months (around 2024) for DM.    Lab Results   Component Value Date    LABA1C 9.0 2023    LABA1C 6.8 2023    LABA1C 7.0 03/15/2023     Lab Results   Component Value Date    MALBCR 15.8 2022    LDLCALC 68 2022    CREATININE 0.9 2022        HPI  Here today for a routine diabetic follow-up and fasting labs.   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    +DM2. He got his A1c checked last month due to his work physical and he was up to 9.0. He was 6.8 at our last visit in .  States that he was out of town for 1 month for work training prior to that and wasn't eating very

## 2024-01-19 LAB
ALBUMIN SERPL-MCNC: 5.1 G/DL (ref 3.4–5)
ALBUMIN/GLOB SERPL: 2.7 {RATIO} (ref 1.1–2.2)
ALP SERPL-CCNC: 90 U/L (ref 40–129)
ALT SERPL-CCNC: 55 U/L (ref 10–40)
ANION GAP SERPL CALCULATED.3IONS-SCNC: 13 MMOL/L (ref 3–16)
AST SERPL-CCNC: 22 U/L (ref 15–37)
BASOPHILS # BLD: 0.1 K/UL (ref 0–0.2)
BASOPHILS NFR BLD: 0.7 %
BILIRUB SERPL-MCNC: 0.5 MG/DL (ref 0–1)
BUN SERPL-MCNC: 14 MG/DL (ref 7–20)
CALCIUM SERPL-MCNC: 9.4 MG/DL (ref 8.3–10.6)
CHLORIDE SERPL-SCNC: 102 MMOL/L (ref 99–110)
CHOLEST SERPL-MCNC: 179 MG/DL (ref 0–199)
CO2 SERPL-SCNC: 24 MMOL/L (ref 21–32)
CREAT SERPL-MCNC: 0.8 MG/DL (ref 0.9–1.3)
CREAT UR-MCNC: 170.7 MG/DL (ref 39–259)
DEPRECATED RDW RBC AUTO: 13.4 % (ref 12.4–15.4)
EOSINOPHIL # BLD: 0.5 K/UL (ref 0–0.6)
EOSINOPHIL NFR BLD: 4.7 %
GFR SERPLBLD CREATININE-BSD FMLA CKD-EPI: >60 ML/MIN/{1.73_M2}
GLUCOSE SERPL-MCNC: 141 MG/DL (ref 70–99)
HCT VFR BLD AUTO: 45.7 % (ref 40.5–52.5)
HDLC SERPL-MCNC: 35 MG/DL (ref 40–60)
HGB BLD-MCNC: 15.6 G/DL (ref 13.5–17.5)
LDLC SERPL CALC-MCNC: 122 MG/DL
LYMPHOCYTES # BLD: 3.3 K/UL (ref 1–5.1)
LYMPHOCYTES NFR BLD: 32.2 %
MCH RBC QN AUTO: 28.4 PG (ref 26–34)
MCHC RBC AUTO-ENTMCNC: 34.3 G/DL (ref 31–36)
MCV RBC AUTO: 82.8 FL (ref 80–100)
MICROALBUMIN UR DL<=1MG/L-MCNC: 1.9 MG/DL
MICROALBUMIN/CREAT UR: 11.1 MG/G (ref 0–30)
MONOCYTES # BLD: 0.6 K/UL (ref 0–1.3)
MONOCYTES NFR BLD: 6.1 %
NEUTROPHILS # BLD: 5.7 K/UL (ref 1.7–7.7)
NEUTROPHILS NFR BLD: 56.3 %
PLATELET # BLD AUTO: 213 K/UL (ref 135–450)
PMV BLD AUTO: 9.5 FL (ref 5–10.5)
POTASSIUM SERPL-SCNC: 4.2 MMOL/L (ref 3.5–5.1)
PROT SERPL-MCNC: 7 G/DL (ref 6.4–8.2)
RBC # BLD AUTO: 5.52 M/UL (ref 4.2–5.9)
SODIUM SERPL-SCNC: 139 MMOL/L (ref 136–145)
TRIGL SERPL-MCNC: 110 MG/DL (ref 0–150)
VLDLC SERPL CALC-MCNC: 22 MG/DL
WBC # BLD AUTO: 10.1 K/UL (ref 4–11)

## 2024-01-20 LAB
EST. AVERAGE GLUCOSE BLD GHB EST-MCNC: 174.3 MG/DL
HBA1C MFR BLD: 7.7 %

## 2024-01-22 ENCOUNTER — OFFICE VISIT (OUTPATIENT)
Dept: CARDIOLOGY CLINIC | Age: 44
End: 2024-01-22
Payer: COMMERCIAL

## 2024-01-22 VITALS
DIASTOLIC BLOOD PRESSURE: 88 MMHG | SYSTOLIC BLOOD PRESSURE: 122 MMHG | OXYGEN SATURATION: 98 % | WEIGHT: 222.2 LBS | HEIGHT: 67 IN | BODY MASS INDEX: 34.88 KG/M2 | HEART RATE: 77 BPM

## 2024-01-22 DIAGNOSIS — I47.29 NSVT (NONSUSTAINED VENTRICULAR TACHYCARDIA) (HCC): ICD-10-CM

## 2024-01-22 DIAGNOSIS — I49.3 PVC (PREMATURE VENTRICULAR CONTRACTION): Primary | Chronic | ICD-10-CM

## 2024-01-22 DIAGNOSIS — G47.33 OSA (OBSTRUCTIVE SLEEP APNEA): Chronic | ICD-10-CM

## 2024-01-22 DIAGNOSIS — E66.9 OBESITY (BMI 30.0-34.9): ICD-10-CM

## 2024-01-22 PROCEDURE — 99214 OFFICE O/P EST MOD 30 MIN: CPT | Performed by: INTERNAL MEDICINE

## 2024-01-22 PROCEDURE — 93000 ELECTROCARDIOGRAM COMPLETE: CPT | Performed by: INTERNAL MEDICINE

## 2024-01-22 RX ORDER — DILTIAZEM HYDROCHLORIDE 180 MG/1
180 CAPSULE, COATED, EXTENDED RELEASE ORAL DAILY
Qty: 90 CAPSULE | Refills: 3 | Status: SHIPPED | OUTPATIENT
Start: 2024-01-22

## 2024-01-22 ASSESSMENT — ENCOUNTER SYMPTOMS
WHEEZING: 0
HEMATOCHEZIA: 0
RIGHT EYE: 0
STRIDOR: 0
SNORING: 0
HEMATEMESIS: 0
SHORTNESS OF BREATH: 0
LEFT EYE: 0

## 2024-01-22 NOTE — PROGRESS NOTES
Assessment:     1. Frequent PVC's: Patient presented with an elevated PVC count of approximately 21% of total beats.  He was essentially asymptomatic.  He was on metoprolol sustained-release for a few weeks.  His ECG initial visit showed continuing significant burden of PVCs.  The morphology of the PVC was left bundle branch block pattern with a left inferior axis and precordial transition between V2 and V3.  This was highly suggestive of an outflow tract origin possibly from the RVOT but could not exclude the possibility of originating from the LVOT or coronary cusp area.     An echocardiogram (August 2022) showed normal overall cardiac structure and function. Repeat 24 hour Holter monitor on metoprolol showed no significant improvement (PVC count 20.5%). We switched him from metoprolol to diltiazem  mg daily. A repeat event monitor still showed a 16% PVC burden.    Patient was found to have significant SINAI on a HST (9.7 AHI). Obstructive sleep apnea can often lead to ventricular and atrial arrhythmias including PVCs. He has started CPAP therapy and doing better in terms of sleep and daytime energy. I recommended to the patient regular exercise and weight loss as this may help his condition.     ECG last visit showed multiple unifocal PVC's.     A one week repeat event monitor to re-assess overall burden was performed (on treatment with diltiazem and CPAP therapy). It showed <<1% PVC burden. Would continue current treatment plan as patient is doing well and tolerating treatment without issues. Would only adjust treatment plan if it starts to fail or if patient develops significant side effects from diltiazem.     Will repeat 72-hour Holter prior to next visit.     2. Good blood pressure control    3. Obstructive sleep apnea: patient compliant with CPAP and feels well with it. I had a detailed discussion with the patient about the connection between obstructive sleep apnea and cardiac rhythm disturbances.  We 
\"BNP\"    ***

## 2024-01-22 NOTE — PATIENT INSTRUCTIONS
Plan:     72 hour event monitor in May, 2024 to reassess PVC burden.   Continue diltiazem.   Follow up in 6 months.

## 2024-01-23 ENCOUNTER — TELEPHONE (OUTPATIENT)
Dept: PRIMARY CARE CLINIC | Age: 44
End: 2024-01-23

## 2024-02-07 ENCOUNTER — OFFICE VISIT (OUTPATIENT)
Dept: PRIMARY CARE CLINIC | Age: 44
End: 2024-02-07
Payer: COMMERCIAL

## 2024-02-07 VITALS
HEART RATE: 117 BPM | BODY MASS INDEX: 34.76 KG/M2 | TEMPERATURE: 99.5 F | WEIGHT: 222 LBS | DIASTOLIC BLOOD PRESSURE: 74 MMHG | OXYGEN SATURATION: 95 % | SYSTOLIC BLOOD PRESSURE: 128 MMHG

## 2024-02-07 DIAGNOSIS — N30.00 ACUTE CYSTITIS WITHOUT HEMATURIA: Primary | ICD-10-CM

## 2024-02-07 DIAGNOSIS — R35.0 FREQUENT URINATION: ICD-10-CM

## 2024-02-07 DIAGNOSIS — N50.812 TESTICULAR PAIN, LEFT: ICD-10-CM

## 2024-02-07 LAB
BILIRUBIN, POC: ABNORMAL
BLOOD URINE, POC: ABNORMAL
CLARITY, POC: ABNORMAL
COLOR, POC: YELLOW
GLUCOSE URINE, POC: ABNORMAL
KETONES, POC: ABNORMAL
LEUKOCYTE EST, POC: ABNORMAL
NITRITE, POC: ABNORMAL
PH, POC: 6
PROTEIN, POC: ABNORMAL
SPECIFIC GRAVITY, POC: 1.02
UROBILINOGEN, POC: 1

## 2024-02-07 PROCEDURE — 81002 URINALYSIS NONAUTO W/O SCOPE: CPT

## 2024-02-07 PROCEDURE — 99213 OFFICE O/P EST LOW 20 MIN: CPT

## 2024-02-07 RX ORDER — NITROFURANTOIN 25; 75 MG/1; MG/1
100 CAPSULE ORAL 2 TIMES DAILY
Qty: 14 CAPSULE | Refills: 0 | Status: SHIPPED | OUTPATIENT
Start: 2024-02-07 | End: 2024-02-14

## 2024-02-07 ASSESSMENT — ENCOUNTER SYMPTOMS
COUGH: 0
NAUSEA: 1
DIARRHEA: 0
VOMITING: 0
SHORTNESS OF BREATH: 0
ABDOMINAL PAIN: 1

## 2024-02-07 NOTE — PROGRESS NOTES
[facility]  2024    Dima Hagan (:  1980) is a 44 y.o. male, here for evaluation of the following medical concerns:    Chief Complaint   Patient presents with    Frequent/Recurrent UTI    Testicle Pain        ASSESSMENT/ PLAN  1. Acute cystitis without hematuria  - nitrofurantoin, macrocrystal-monohydrate, (MACROBID) 100 MG capsule; Take 1 capsule by mouth 2 times daily for 7 days  Dispense: 14 capsule; Refill: 0  - Culture, Urine    2. Frequent urination  - POCT Urinalysis no Micro    3. Testicular pain, left  - POCT Urinalysis no Micro     - Supportive care measures discussed.  - I asked that he let me know if anything feels worse tomorrow.    1. Increase water intake  2. Avoid caffeine for 5-7 days  3. Return if symptoms reoccur of fail to improve  4. Complete full course of antibiotics     Return if symptoms worsen or fail to improve.    HPI  Here today with urinary symptoms.  States that it all started last night while he was at work.  +dysuria  +urinary frequency  +pain & tenderness in the left testicle  +fever/chills; temp is 99.5 here. Having body aches.  +LLQ discomfort  +slight nausea; no vomiting.   No flank pain.   No changes in his routine with work, etc.     History of BPH. Had a biopsy done about 8 years ago. Has followed with Dr. Brooks at [facility].    BS is currently 130 mg/dL.     Has not had any medication yet for his symptoms.      ROS  Review of Systems   Constitutional:  Positive for appetite change, chills and fever.   HENT: Negative.     Respiratory:  Negative for cough and shortness of breath.    Cardiovascular:  Negative for chest pain, palpitations and leg swelling.   Gastrointestinal:  Positive for abdominal pain and nausea. Negative for diarrhea and vomiting.   Genitourinary:  Positive for dysuria, frequency, testicular pain (left; \"sore\") and urgency. Negative for difficulty urinating, flank pain, hematuria, penile discharge,

## 2024-02-07 NOTE — PATIENT INSTRUCTIONS
1. Increase water intake  2. Avoid caffeine for 5-7 days  3. Return if symptoms reoccur of fail to improve  4. Complete full course of antibiotics    Continue monitoring blood sugars    Can take Tylenol and/or ibuprofen as needed for pain, discomfort, etc.

## 2024-02-08 ENCOUNTER — TELEPHONE (OUTPATIENT)
Dept: PRIMARY CARE CLINIC | Age: 44
End: 2024-02-08

## 2024-02-08 DIAGNOSIS — N50.812 TESTICULAR PAIN, LEFT: ICD-10-CM

## 2024-02-08 DIAGNOSIS — N30.00 ACUTE CYSTITIS WITHOUT HEMATURIA: Primary | ICD-10-CM

## 2024-02-08 RX ORDER — SULFAMETHOXAZOLE AND TRIMETHOPRIM 800; 160 MG/1; MG/1
1 TABLET ORAL 2 TIMES DAILY
Qty: 14 TABLET | Refills: 0 | Status: SHIPPED | OUTPATIENT
Start: 2024-02-08 | End: 2024-02-15

## 2024-02-08 NOTE — TELEPHONE ENCOUNTER
OK. Please call and let her know that I switched his antibiotic to something with broader coverage in case he has prostate involvement.  It is Bactrim. He will take it twice a day for 7 days. He should take 2 doses of that today, 8-12 hours apart.   I sent it in to the pharmacy.    We should have the culture results back tomorrow.    Keep me posted--- thanks.

## 2024-02-08 NOTE — TELEPHONE ENCOUNTER
Wife called today and Dima's fever is 101 and feeling rough. He has no appetite. Sleeping a lot, he said it is not any better.     Was told to call back if no changes or worse.

## 2024-02-09 LAB
BACTERIA UR CULT: ABNORMAL
ORGANISM: ABNORMAL

## 2024-03-20 ENCOUNTER — OFFICE VISIT (OUTPATIENT)
Dept: URGENT CARE | Age: 44
End: 2024-03-20

## 2024-03-20 VITALS
DIASTOLIC BLOOD PRESSURE: 88 MMHG | WEIGHT: 219 LBS | SYSTOLIC BLOOD PRESSURE: 138 MMHG | BODY MASS INDEX: 34.37 KG/M2 | TEMPERATURE: 97.5 F | OXYGEN SATURATION: 95 % | HEIGHT: 67 IN | HEART RATE: 86 BPM

## 2024-03-20 DIAGNOSIS — R30.9 PAINFUL URINATION: ICD-10-CM

## 2024-03-20 DIAGNOSIS — N30.01 ACUTE CYSTITIS WITH HEMATURIA: Primary | ICD-10-CM

## 2024-03-20 LAB
APPEARANCE FLUID: ABNORMAL
BILIRUBIN, POC: NEGATIVE
BLOOD URINE, POC: ABNORMAL
CLARITY, POC: ABNORMAL
COLOR, POC: ABNORMAL
GLUCOSE URINE, POC: ABNORMAL
KETONES, POC: NEGATIVE
LEUKOCYTE EST, POC: ABNORMAL
NITRITE, POC: NEGATIVE
PH, POC: 6
PROTEIN, POC: ABNORMAL
SPECIFIC GRAVITY, POC: >=1.03
UROBILINOGEN, POC: ABNORMAL

## 2024-03-20 RX ORDER — CEPHALEXIN 500 MG/1
500 CAPSULE ORAL 2 TIMES DAILY
Qty: 14 CAPSULE | Refills: 0 | Status: SHIPPED | OUTPATIENT
Start: 2024-03-20 | End: 2024-03-27

## 2024-03-20 NOTE — PATIENT INSTRUCTIONS
Urine culture sent to confirm, to identify pathogen, and to clarify sensitivities.   Will augment treatment plan as necessary pending culture results.  Keflex is prescribed for antibiotic treatment  Take the antibiotics until completed, do not stop unless otherwise directed by a healthcare provider.  Recommend daily yogurt or other probiotics while on antibiotics.  Increase water intake during treatment.  Can take ibuprofen (Motrin or Advil) or Acetaminophen (Tylenol) for pain symptoms.  If fevers, shivering, nausea, vomiting, shortness of breath, chest pain, if severe abdominal or back pain develops, or if symptoms continue to worsen despite treatment plan, follow up with the ER for further evaluation.  Follow up with your PCP within 5 days or, if unable, you can return to the clinic if symptoms persist beyond 5 days or if symptoms worsen.    New Prescriptions    CEPHALEXIN (KEFLEX) 500 MG CAPSULE    Take 1 capsule by mouth 2 times daily for 7 days

## 2024-03-20 NOTE — PROGRESS NOTES
Dima Hagan (: 1980) is a 44 y.o. male, Established patient, here for evaluation of the following chief complaint(s):  Urinary Tract Infection (Frequency and pt states some blood in urine as of today. Started yesterday. Pt states he had a UTI in -feb. )      ASSESSMENT/PLAN:    ICD-10-CM    1. Acute cystitis with hematuria  N30.01 cephALEXin (KEFLEX) 500 MG capsule      2. Painful urination  R30.9 POCT Urinalysis no Micro     Culture, Urine          UA showing leukocytes and blood, and with symptoms, consistent for UTI  Exam without concerns for complicated UTI (no CVA tenderness, fevers, nausea, vomiting, or abdominal pain).  Discussed with pt that his diabetes does place him at a higher rate for developing UTIs as a male.  Low concern for sepsis, pyelonephritis, nephrolith, colitis, diverticulitis, appendicitis, and vulvo-vaginitis.  Given known history of kidney stones, cannot fully rule out, however due to the presence of leukocytes and pt's history of persistent symptoms, higher concern for UTI at this time over stones.   Urine culture sent to confirm, to identify pathogen, and to clarify sensitivities.   Will augment treatment plan as necessary pending culture results.  Keflex is prescribed for antibiotic treatment  Increase water intake during treatment.  ibuprofen (Motrin or Advil) or Acetaminophen (Tylenol) for pain symptoms.  Strict ED follow up instructions provided.    Follow up with your PCP within 5 days or, if unable, you can return to the clinic if symptoms persist beyond 5 days or if symptoms worsen.  The patient tolerated their visit well. The patient and/or the family were informed of the results of any tests, a time was given to answer questions, a plan was proposed and they agreed with plan. Reviewed AVS with treatment instructions and answered questions - pt/family expresses understanding and agreement with the discussed treatment plan and AVS

## 2024-03-21 DIAGNOSIS — E11.9 TYPE 2 DIABETES MELLITUS WITHOUT COMPLICATION, WITHOUT LONG-TERM CURRENT USE OF INSULIN (HCC): ICD-10-CM

## 2024-03-21 RX ORDER — LISINOPRIL 5 MG/1
TABLET ORAL
Qty: 15 TABLET | Refills: 2 | Status: SHIPPED | OUTPATIENT
Start: 2024-03-21

## 2024-03-21 RX ORDER — GLIPIZIDE 5 MG/1
TABLET ORAL
Qty: 90 TABLET | Refills: 2 | Status: SHIPPED | OUTPATIENT
Start: 2024-03-21

## 2024-03-21 NOTE — TELEPHONE ENCOUNTER
Last seen 2/7/24  Last filled 12/22/23 for the Lisinopril   Last filled 6/26/23 for the Glipizide.

## 2024-03-23 LAB
BACTERIA UR CULT: ABNORMAL
ORGANISM: ABNORMAL

## 2024-03-24 DIAGNOSIS — E11.9 TYPE 2 DIABETES MELLITUS WITHOUT COMPLICATION, WITHOUT LONG-TERM CURRENT USE OF INSULIN (HCC): ICD-10-CM

## 2024-03-25 RX ORDER — METFORMIN HYDROCHLORIDE 500 MG/1
TABLET, EXTENDED RELEASE ORAL
Qty: 360 TABLET | Refills: 1 | Status: SHIPPED | OUTPATIENT
Start: 2024-03-25

## 2024-03-30 ENCOUNTER — OFFICE VISIT (OUTPATIENT)
Dept: URGENT CARE | Age: 44
End: 2024-03-30

## 2024-03-30 VITALS
OXYGEN SATURATION: 96 % | HEIGHT: 67 IN | TEMPERATURE: 98.3 F | SYSTOLIC BLOOD PRESSURE: 130 MMHG | WEIGHT: 215 LBS | DIASTOLIC BLOOD PRESSURE: 86 MMHG | BODY MASS INDEX: 33.74 KG/M2 | HEART RATE: 90 BPM

## 2024-03-30 DIAGNOSIS — N30.01 ACUTE CYSTITIS WITH HEMATURIA: Primary | ICD-10-CM

## 2024-03-30 DIAGNOSIS — R39.198 DIFFICULTY URINATING: ICD-10-CM

## 2024-03-30 LAB
BILIRUBIN, POC: NEGATIVE
BLOOD URINE, POC: ABNORMAL
CLARITY, POC: ABNORMAL
COLOR, POC: YELLOW
GLUCOSE URINE, POC: NEGATIVE
KETONES, POC: NEGATIVE
LEUKOCYTE EST, POC: ABNORMAL
NITRITE, POC: NEGATIVE
PH, POC: 5.5
PROTEIN, POC: ABNORMAL
SPECIFIC GRAVITY, POC: >=1.03
UROBILINOGEN, POC: ABNORMAL

## 2024-03-30 RX ORDER — CEFPODOXIME PROXETIL 200 MG/1
200 TABLET, FILM COATED ORAL 2 TIMES DAILY
Qty: 20 TABLET | Refills: 0 | Status: SHIPPED | OUTPATIENT
Start: 2024-03-30 | End: 2024-04-09

## 2024-03-30 NOTE — PROGRESS NOTES
Dima Hagan (: 1980) is a 44 y.o. male, Established patient, here for evaluation of the following chief complaint(s):  Urinary Tract Infection (Blood in urine, trouble urinating this morning, blood sugar is high.  Patient is diabetic.  Has had 3 UTIs in the pas 2 months, just finished antibiotics from last one.  )      ASSESSMENT/PLAN:    ICD-10-CM    1. Acute cystitis with hematuria  N30.01 Culture, Urine     JAYLAN - Nikolai Vuong MD, Urology, [facility]     cefpodoxime (VANTIN) 200 MG tablet      2. Difficulty urinating  R39.198 POCT Urinalysis no Micro          UA showing leukocytes and blood, and with symptoms, consistent for UTI  Given recurrence of symptoms so shortly after completing antibiotics, will treat as complicated case.  Low concern for sepsis, pyelonephritis, nephrolith, colitis, diverticulitis, STIs, and appendicitis.  Given noted relief of symptoms with previous UTI treatment, and without more complex symptoms at this time (fevers, back pain), low concern for prostatitis at this time.  Urine culture sent to confirm, to identify pathogen, and to clarify sensitivities.   Will augment treatment plan as necessary pending culture results.  Cefpodoxime is prescribed for antibiotic treatment  Increase water intake during treatment.  ibuprofen (Motrin or Advil) or Acetaminophen (Tylenol) for pain symptoms.  Referral to Urology provided due to recurrent UTI symptoms, and noted history of prostate issues in the past.  Strict ED follow up instructions provided.    Follow up with your PCP within 5 days or, if unable, you can return to the clinic if symptoms persist beyond 5 days or if symptoms worsen.  The patient tolerated their visit well. The patient and/or the family were informed of the results of any tests, a time was given to answer questions, a plan was proposed and they agreed with plan. Reviewed AVS with treatment instructions and answered questions - pt/family expresses understanding and

## 2024-03-30 NOTE — PATIENT INSTRUCTIONS
Follow up with the provided Urology referral due to your recurrent UTIs.  Urine culture sent to confirm, to identify pathogen, and to clarify sensitivities.   Will augment treatment plan as necessary pending culture results.  Cefpodoxime is prescribed for antibiotic treatment  Take the antibiotics until completed, do not stop unless otherwise directed by a healthcare provider.  Recommend daily yogurt or other probiotics while on antibiotics.  Increase water intake during treatment.  Can take ibuprofen (Motrin or Advil) or Acetaminophen (Tylenol) for pain symptoms.  If fevers, shivering, nausea, vomiting, shortness of breath, chest pain, if severe abdominal or back pain develops, or if symptoms continue to worsen despite treatment plan, follow up with the ER for further evaluation.  Follow up with your PCP within 5 days or, if unable, you can return to the clinic if symptoms persist beyond 5 days or if symptoms worsen.    New Prescriptions    CEFPODOXIME (VANTIN) 200 MG TABLET    Take 1 tablet by mouth 2 times daily for 10 days

## 2024-03-31 LAB
BACTERIA UR CULT: ABNORMAL
ORGANISM: ABNORMAL

## 2024-04-01 LAB
BACTERIA UR CULT: ABNORMAL
BACTERIA UR CULT: ABNORMAL
ORGANISM: ABNORMAL

## 2024-04-12 ENCOUNTER — TELEPHONE (OUTPATIENT)
Dept: PRIMARY CARE CLINIC | Age: 44
End: 2024-04-12

## 2024-04-12 NOTE — TELEPHONE ENCOUNTER
Wife called and Dima went to his Ortho doctor for his foot and was put on Steroids. He was told to get the ok from his PCP because he is a diabetic. He had problems with his the last time he took a steroid.     Can you please respond to him through his Mychart.

## 2024-04-12 NOTE — TELEPHONE ENCOUNTER
-Received inbox message related to wife and patient's concern for starting Medrol Dosepak prescribed by Ortho provider today     With chart review  -Patient with recurrent UTIs,  -Current A1c 1/18/2024 7.7 from 9.0 12 6/23 patient's been working extremely hard on diet medication compliance  Ortho Diagnosis-Dr. 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 Sheehan with [facility] today  Wanted to make sure PCP NP Jocelynn was okay with patient starting medication.  -Provider/PCP not in office/unreachable at this time message was forward to my inbox  -patient endorses ongoing pain and feels medication would be beneficial  -Discussed with patient risk versus benefits,   -Discussed CGM, diet, signs and symptoms, red flags and need for emergent care  -Discussed not taking NSAIDs with steroid  -Discussed patient following up with PCP as well as orthopedic provider  -Message will be forwarded to PCP

## 2024-04-16 DIAGNOSIS — E11.9 TYPE 2 DIABETES MELLITUS WITHOUT COMPLICATION, WITHOUT LONG-TERM CURRENT USE OF INSULIN (HCC): ICD-10-CM

## 2024-04-16 RX ORDER — LISINOPRIL 5 MG/1
TABLET ORAL
Qty: 45 TABLET | Refills: 0 | Status: SHIPPED | OUTPATIENT
Start: 2024-04-16 | End: 2024-07-15

## 2024-04-30 ENCOUNTER — TELEPHONE (OUTPATIENT)
Dept: CARDIOLOGY CLINIC | Age: 44
End: 2024-04-30

## 2024-04-30 PROCEDURE — 93242 EXT ECG>48HR<7D RECORDING: CPT | Performed by: INTERNAL MEDICINE

## 2024-04-30 NOTE — TELEPHONE ENCOUNTER
Monitor placed by WILMER Monge, RN  Monitor company [employer]  Length of monitor 72 hours  Monitor ordered by Emilee  Patch ID [id number]  Phone ID [phone]  Activation successful prior to pt leaving office? Yes

## 2024-04-30 NOTE — TELEPHONE ENCOUNTER
I spoke with the patient and let him know I will contact the rep from . First I will find out about the $1,000 bill, and second I will find out about the monitor he needs to wear now. I told the patient I would get back with him with info.

## 2024-04-30 NOTE — TELEPHONE ENCOUNTER
I spoke with the patient again. I let him know that the extended holter should be covered at 100%. FALGUNI is in network with . He will go back to [place] today and have the monitor placed. Just a reminder when enrolling the patient bu sure to select \"extended holter\" 3 days.    Thank you

## 2024-04-30 NOTE — TELEPHONE ENCOUNTER
Pt came to office today to have a monitor placed.  He will only wear a medilynx monitor due to cost.  [employer] charged him over a thousand dollars.  We do not have medilynx at [place] office.      Spoke with PEDRO Lewis and she will mail monitor to pt.  His physical address is [address] [place] [address]

## 2024-05-10 PROCEDURE — 93244 EXT ECG>48HR<7D REV&INTERPJ: CPT | Performed by: INTERNAL MEDICINE

## 2024-05-13 ENCOUNTER — TELEPHONE (OUTPATIENT)
Dept: CARDIOLOGY CLINIC | Age: 44
End: 2024-05-13

## 2024-05-13 DIAGNOSIS — I49.3 PVC (PREMATURE VENTRICULAR CONTRACTION): Chronic | ICD-10-CM

## 2024-05-13 NOTE — TELEPHONE ENCOUNTER
Spoke with the patient. Relayed cardiac event monitor results. Patient told to follow up as scheduled. They V/U.

## 2024-06-22 DIAGNOSIS — N30.00 ACUTE CYSTITIS WITHOUT HEMATURIA: ICD-10-CM

## 2024-06-22 DIAGNOSIS — B96.89 ACUTE BACTERIAL SINUSITIS: ICD-10-CM

## 2024-06-22 DIAGNOSIS — N30.01 ACUTE CYSTITIS WITH HEMATURIA: ICD-10-CM

## 2024-06-22 DIAGNOSIS — N50.812 TESTICULAR PAIN, LEFT: ICD-10-CM

## 2024-06-22 DIAGNOSIS — J01.90 ACUTE BACTERIAL SINUSITIS: ICD-10-CM

## 2024-06-24 RX ORDER — AMOXICILLIN AND CLAVULANATE POTASSIUM 875; 125 MG/1; MG/1
1 TABLET, FILM COATED ORAL 2 TIMES DAILY
Qty: 20 TABLET | Refills: 0 | OUTPATIENT
Start: 2024-06-24 | End: 2024-07-04

## 2024-06-24 RX ORDER — SULFAMETHOXAZOLE AND TRIMETHOPRIM 800; 160 MG/1; MG/1
1 TABLET ORAL 2 TIMES DAILY
Qty: 14 TABLET | Refills: 0 | OUTPATIENT
Start: 2024-06-24 | End: 2024-07-01

## 2024-06-24 RX ORDER — NITROFURANTOIN 25; 75 MG/1; MG/1
CAPSULE ORAL
Qty: 14 CAPSULE | Refills: 0 | OUTPATIENT
Start: 2024-06-24

## 2024-06-24 NOTE — TELEPHONE ENCOUNTER
Called and spoke with his wife Jimena and they did not ask for these medication refills.   Spoke with the pharmacy and they are going to look into it. They are not needed.

## 2024-06-25 ASSESSMENT — ENCOUNTER SYMPTOMS
HEMATOCHEZIA: 0
SHORTNESS OF BREATH: 0
HEMATEMESIS: 0
LEFT EYE: 0
WHEEZING: 0
RIGHT EYE: 0
STRIDOR: 0
SNORING: 0

## 2024-06-25 NOTE — PROGRESS NOTES
Activity: Unknown (12/7/2022)    Exercise Vital Sign     Days of Exercise per Week: 3 days     Minutes of Exercise per Session: Not on file   Stress: Not on file   Social Connections: Not on file   Intimate Partner Violence: Not At Risk (12/7/2022)    Humiliation, Afraid, Rape, and Kick questionnaire     Fear of Current or Ex-Partner: No     Emotionally Abused: No     Physically Abused: No     Sexually Abused: No   Housing Stability: Low Risk  (6/6/2022)    Housing Stability Vital Sign     Unable to Pay for Housing in the Last Year: No     Number of Places Lived in the Last Year: 0     Unstable Housing in the Last Year: No         Family History   Problem Relation Age of Onset    Diabetes Other     Diabetes Father     Thyroid Disease Mother     No Known Problems Brother     No Known Problems Brother     No Known Problems Brother          Objective:     /82   Pulse 68   Ht 1.702 m (5' 7.01\")   Wt 97.2 kg (214 lb 3.2 oz)   SpO2 99%   BMI 33.54 kg/m²     Physical Exam  Constitutional:       Appearance: Normal appearance.   HENT:      Head: Normocephalic and atraumatic.      Nose: Nose normal. No rhinorrhea.   Eyes:      General: No scleral icterus.     Conjunctiva/sclera: Conjunctivae normal.   Cardiovascular:      Rate and Rhythm: Normal rate and regular rhythm. No extrasystoles are present.  Pulmonary:      Effort: Pulmonary effort is normal.      Breath sounds: Normal breath sounds.   Abdominal:      General: There is no distension.   Musculoskeletal:         General: Normal range of motion.      Cervical back: Normal range of motion and neck supple.   Skin:     General: Skin is warm and dry.   Neurological:      General: No focal deficit present.      Mental Status: He is alert and oriented to person, place, and time.   Psychiatric:         Mood and Affect: Mood normal.         Behavior: Behavior normal.       ECG Interpretation:  (Date: 06/26/2024)  Rhythm: Sinus rhythm  Rate: 68 BPM  PAC's / PVC's

## 2024-06-26 ENCOUNTER — OFFICE VISIT (OUTPATIENT)
Dept: CARDIOLOGY CLINIC | Age: 44
End: 2024-06-26
Payer: COMMERCIAL

## 2024-06-26 VITALS
WEIGHT: 214.2 LBS | OXYGEN SATURATION: 99 % | HEART RATE: 68 BPM | BODY MASS INDEX: 33.62 KG/M2 | DIASTOLIC BLOOD PRESSURE: 82 MMHG | HEIGHT: 67 IN | SYSTOLIC BLOOD PRESSURE: 126 MMHG

## 2024-06-26 DIAGNOSIS — G47.33 OSA (OBSTRUCTIVE SLEEP APNEA): ICD-10-CM

## 2024-06-26 DIAGNOSIS — Z51.81 ENCOUNTER FOR MONITORING CALCIUM CHANNEL BLOCKER THERAPY: ICD-10-CM

## 2024-06-26 DIAGNOSIS — I49.3 PVC (PREMATURE VENTRICULAR CONTRACTION): Primary | Chronic | ICD-10-CM

## 2024-06-26 DIAGNOSIS — E66.9 OBESITY (BMI 30.0-34.9): ICD-10-CM

## 2024-06-26 DIAGNOSIS — Z79.899 ENCOUNTER FOR MONITORING CALCIUM CHANNEL BLOCKER THERAPY: ICD-10-CM

## 2024-06-26 PROCEDURE — 93000 ELECTROCARDIOGRAM COMPLETE: CPT | Performed by: INTERNAL MEDICINE

## 2024-06-26 PROCEDURE — 99214 OFFICE O/P EST MOD 30 MIN: CPT | Performed by: INTERNAL MEDICINE

## 2024-06-26 RX ORDER — CEPHALEXIN 500 MG/1
CAPSULE ORAL
Qty: 14 CAPSULE | Refills: 0 | OUTPATIENT
Start: 2024-06-26

## 2024-06-26 RX ORDER — DILTIAZEM HYDROCHLORIDE 180 MG/1
180 CAPSULE, COATED, EXTENDED RELEASE ORAL DAILY
Qty: 90 CAPSULE | Refills: 3 | Status: SHIPPED | OUTPATIENT
Start: 2024-06-26

## 2024-06-26 ASSESSMENT — ENCOUNTER SYMPTOMS: SLEEP DISTURBANCES DUE TO BREATHING: 1

## 2024-06-26 NOTE — PATIENT INSTRUCTIONS
Plan:     The current medical regimen is effective;  continue present plan and medications.  Follow up in 12 months.

## 2024-07-12 DIAGNOSIS — E11.9 TYPE 2 DIABETES MELLITUS WITHOUT COMPLICATION, WITHOUT LONG-TERM CURRENT USE OF INSULIN (HCC): ICD-10-CM

## 2024-07-12 RX ORDER — LISINOPRIL 5 MG/1
TABLET ORAL
Qty: 45 TABLET | Refills: 1 | Status: SHIPPED | OUTPATIENT
Start: 2024-07-12 | End: 2024-10-10

## 2024-07-24 DIAGNOSIS — E11.65 TYPE 2 DIABETES MELLITUS WITH HYPERGLYCEMIA, WITHOUT LONG-TERM CURRENT USE OF INSULIN (HCC): ICD-10-CM

## 2024-07-24 DIAGNOSIS — E78.2 MIXED HYPERLIPIDEMIA: ICD-10-CM

## 2024-07-24 RX ORDER — ATORVASTATIN CALCIUM 40 MG/1
40 TABLET, FILM COATED ORAL DAILY
Qty: 30 TABLET | Refills: 0 | Status: SHIPPED | OUTPATIENT
Start: 2024-07-24

## 2024-08-08 ENCOUNTER — OFFICE VISIT (OUTPATIENT)
Dept: PRIMARY CARE CLINIC | Age: 44
End: 2024-08-08
Payer: COMMERCIAL

## 2024-08-08 VITALS
TEMPERATURE: 97.9 F | DIASTOLIC BLOOD PRESSURE: 78 MMHG | RESPIRATION RATE: 14 BRPM | BODY MASS INDEX: 33.92 KG/M2 | SYSTOLIC BLOOD PRESSURE: 122 MMHG | OXYGEN SATURATION: 97 % | HEART RATE: 81 BPM | WEIGHT: 216.6 LBS

## 2024-08-08 DIAGNOSIS — Z01.00 DIABETIC EYE EXAM (HCC): ICD-10-CM

## 2024-08-08 DIAGNOSIS — I49.3 PVC (PREMATURE VENTRICULAR CONTRACTION): Chronic | ICD-10-CM

## 2024-08-08 DIAGNOSIS — E11.9 TYPE 2 DIABETES MELLITUS WITHOUT COMPLICATION, WITHOUT LONG-TERM CURRENT USE OF INSULIN (HCC): Primary | ICD-10-CM

## 2024-08-08 DIAGNOSIS — E78.2 MIXED HYPERLIPIDEMIA: ICD-10-CM

## 2024-08-08 DIAGNOSIS — E11.9 DIABETIC EYE EXAM (HCC): ICD-10-CM

## 2024-08-08 DIAGNOSIS — G47.33 OSA (OBSTRUCTIVE SLEEP APNEA): Chronic | ICD-10-CM

## 2024-08-08 LAB — HBA1C MFR BLD: 7.8 %

## 2024-08-08 PROCEDURE — 83036 HEMOGLOBIN GLYCOSYLATED A1C: CPT

## 2024-08-08 PROCEDURE — 99214 OFFICE O/P EST MOD 30 MIN: CPT

## 2024-08-08 PROCEDURE — 3051F HG A1C>EQUAL 7.0%<8.0%: CPT

## 2024-08-08 RX ORDER — GLIPIZIDE 10 MG/1
10 TABLET, FILM COATED, EXTENDED RELEASE ORAL DAILY
Qty: 90 TABLET | Refills: 1 | Status: SHIPPED | OUTPATIENT
Start: 2024-08-08

## 2024-08-08 RX ORDER — ATORVASTATIN CALCIUM 40 MG/1
40 TABLET, FILM COATED ORAL DAILY
Qty: 90 TABLET | Refills: 1 | Status: SHIPPED | OUTPATIENT
Start: 2024-08-08

## 2024-08-08 ASSESSMENT — ENCOUNTER SYMPTOMS
COUGH: 0
SHORTNESS OF BREATH: 0
NAUSEA: 0
VOMITING: 0

## 2024-08-08 NOTE — PROGRESS NOTES
[facility]  2024    Dima Hagan (:  1980) is a 44 y.o. male, here for evaluation of the following medical concerns:    Chief Complaint   Patient presents with    Diabetes        ASSESSMENT/ PLAN  1. Type 2 diabetes mellitus without complication, without long-term current use of insulin ([facility])  - A1c 7.8% today; slight increase. Will increase glipizide to 10mg.  - POCT glycosylated hemoglobin (Hb A1C)  - glipiZIDE (GLUCOTROL XL) 10 MG extended release tablet; Take 1 tablet by mouth daily  Dispense: 90 tablet; Refill: 1  - JAYLAN - Amie Ross, SULEMAN, Optometry, [facility]    2. Mixed hyperlipidemia  - atorvastatin (LIPITOR) 40 MG tablet; Take 1 tablet by mouth daily  Dispense: 90 tablet; Refill: 1    3. SIN[facility] (obstructive sleep apnea)  - Stable; follows with Sleep Medicine.    4. PVC (premature ventricular contraction)  - Stable; following with Cardiology. On diltiazem.     5. Diabetic eye exam (HCC)  - JAYLAN - Amie Ross, SULEMAN, Optometry, [facility]       Return in about 5 months (around 2025) for Diabetes and fasting labs.    HPI  Here today for his diabetes follow-up.  His son has a baby boy last month, his first grandson. \"Grant.\"   Still driving a lot for work. Carries snacks in the car.  Works 3rd shift.    +DM2. Last A1c in January was 7.7.  Current medications include: metformin 1000mg BID and glipizide 5mg daily.  Tolerating well.  Freestyle Janel device. CGM. Uses the karen. States that his sugars are stable.   Denies any episodes of hypoglycemia.  He takes 40mg atorvastatin and 2.5mg lisinopril daily.   Weight stable.  Last eye exam: never. Used [employer] for his glasses but doesn't recall getting a diabetic eye exam.    +SINAI. On CPAP. Compliant. Sees Ashleigh yearly; January.    Seeing Urology for recurrent UTIs. No urinary symptoms today.  Had a xray scan in . Going back later this month for a cystoscopy.     Following with Cardiology for

## 2024-08-16 ENCOUNTER — TELEPHONE (OUTPATIENT)
Dept: PRIMARY CARE CLINIC | Age: 44
End: 2024-08-16

## 2024-08-16 ENCOUNTER — PATIENT MESSAGE (OUTPATIENT)
Dept: CARDIOLOGY CLINIC | Age: 44
End: 2024-08-16

## 2024-08-16 NOTE — TELEPHONE ENCOUNTER
I called and spoke with personnel in the PAT department at [facility].  She said that yes, I could amend my note from his visit on 8/8 to serve as the pre-op.

## 2024-08-16 NOTE — TELEPHONE ENCOUNTER
LOV KXA 6/26/2024  Please review and advise on clearance, not on OAC.   I sent a message to patient asking what procedure patient is having.

## 2024-08-16 NOTE — TELEPHONE ENCOUNTER
The patient's wife Jimena dropped off his H&P paperwork for [facility].  He is scheduled for a Cystoscopy on 8/29/24 with Dr. Vuong.   As he was recently seen here for his routine follow-up visit, she is wondering if that visit can be used for his pre-op.    Per chart review, he has received cardiology clearance from Dr. Hebert.

## 2024-09-18 DIAGNOSIS — E11.9 TYPE 2 DIABETES MELLITUS WITHOUT COMPLICATION, WITHOUT LONG-TERM CURRENT USE OF INSULIN (HCC): ICD-10-CM

## 2024-09-18 RX ORDER — METFORMIN HCL 500 MG
TABLET, EXTENDED RELEASE 24 HR ORAL
Qty: 360 TABLET | Refills: 1 | Status: SHIPPED | OUTPATIENT
Start: 2024-09-18

## 2024-12-05 ENCOUNTER — TELEPHONE (OUTPATIENT)
Dept: CARDIOLOGY CLINIC | Age: 44
End: 2024-12-05

## 2024-12-05 NOTE — TELEPHONE ENCOUNTER
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 Pt states if placed in [employer] he can print out.

## 2024-12-16 DIAGNOSIS — E11.65 TYPE 2 DIABETES MELLITUS WITH HYPERGLYCEMIA, WITHOUT LONG-TERM CURRENT USE OF INSULIN (HCC): ICD-10-CM

## 2025-01-09 DIAGNOSIS — E11.9 TYPE 2 DIABETES MELLITUS WITHOUT COMPLICATION, WITHOUT LONG-TERM CURRENT USE OF INSULIN (HCC): ICD-10-CM

## 2025-01-09 RX ORDER — LISINOPRIL 5 MG/1
TABLET ORAL
Qty: 45 TABLET | Refills: 1 | Status: SHIPPED | OUTPATIENT
Start: 2025-01-09 | End: 2025-04-09

## 2025-01-17 ENCOUNTER — OFFICE VISIT (OUTPATIENT)
Age: 45
End: 2025-01-17
Payer: COMMERCIAL

## 2025-01-17 VITALS
BODY MASS INDEX: 34.21 KG/M2 | WEIGHT: 218 LBS | HEIGHT: 67 IN | RESPIRATION RATE: 20 BRPM | HEART RATE: 88 BPM | OXYGEN SATURATION: 95 % | DIASTOLIC BLOOD PRESSURE: 70 MMHG | SYSTOLIC BLOOD PRESSURE: 108 MMHG

## 2025-01-17 DIAGNOSIS — G47.33 OSA (OBSTRUCTIVE SLEEP APNEA): Primary | ICD-10-CM

## 2025-01-17 PROBLEM — E78.2 MIXED HYPERLIPIDEMIA: Chronic | Status: ACTIVE | Noted: 2024-01-18

## 2025-01-17 PROBLEM — E11.65 TYPE 2 DIABETES MELLITUS WITH HYPERGLYCEMIA (HCC): Chronic | Status: ACTIVE | Noted: 2024-01-18

## 2025-01-17 PROCEDURE — 99213 OFFICE O/P EST LOW 20 MIN: CPT

## 2025-01-17 ASSESSMENT — SLEEP AND FATIGUE QUESTIONNAIRES
HOW LIKELY ARE YOU TO NOD OFF OR FALL ASLEEP WHEN YOU ARE A PASSENGER IN A CAR FOR AN HOUR WITHOUT A BREAK: SLIGHT CHANCE OF DOZING
HOW LIKELY ARE YOU TO NOD OFF OR FALL ASLEEP WHILE SITTING AND READING: SLIGHT CHANCE OF DOZING
HOW LIKELY ARE YOU TO NOD OFF OR FALL ASLEEP WHILE SITTING INACTIVE IN A PUBLIC PLACE: WOULD NEVER DOZE
HOW LIKELY ARE YOU TO NOD OFF OR FALL ASLEEP WHILE SITTING QUIETLY AFTER LUNCH WITHOUT ALCOHOL: WOULD NEVER DOZE
ESS TOTAL SCORE: 5
HOW LIKELY ARE YOU TO NOD OFF OR FALL ASLEEP IN A CAR, WHILE STOPPED FOR A FEW MINUTES IN TRAFFIC: WOULD NEVER DOZE
HOW LIKELY ARE YOU TO NOD OFF OR FALL ASLEEP WHILE SITTING QUIETLY AFTER LUNCH WITHOUT ALCOHOL: WOULD NEVER DOZE
HOW LIKELY ARE YOU TO NOD OFF OR FALL ASLEEP WHILE LYING DOWN TO REST IN THE AFTERNOON WHEN CIRCUMSTANCES PERMIT: MODERATE CHANCE OF DOZING
HOW LIKELY ARE YOU TO NOD OFF OR FALL ASLEEP WHILE SITTING AND TALKING TO SOMEONE: WOULD NEVER DOZE
HOW LIKELY ARE YOU TO NOD OFF OR FALL ASLEEP IN A CAR, WHILE STOPPED FOR A FEW MINUTES IN TRAFFIC: WOULD NEVER DOZE
HOW LIKELY ARE YOU TO NOD OFF OR FALL ASLEEP WHILE WATCHING TV: SLIGHT CHANCE OF DOZING
HOW LIKELY ARE YOU TO NOD OFF OR FALL ASLEEP WHILE LYING DOWN TO REST IN THE AFTERNOON WHEN CIRCUMSTANCES PERMIT: MODERATE CHANCE OF DOZING
HOW LIKELY ARE YOU TO NOD OFF OR FALL ASLEEP WHILE WATCHING TV: SLIGHT CHANCE OF DOZING
HOW LIKELY ARE YOU TO NOD OFF OR FALL ASLEEP WHILE SITTING AND READING: SLIGHT CHANCE OF DOZING
HOW LIKELY ARE YOU TO NOD OFF OR FALL ASLEEP WHEN YOU ARE A PASSENGER IN A CAR FOR AN HOUR WITHOUT A BREAK: SLIGHT CHANCE OF DOZING
HOW LIKELY ARE YOU TO NOD OFF OR FALL ASLEEP WHILE SITTING AND TALKING TO SOMEONE: WOULD NEVER DOZE
HOW LIKELY ARE YOU TO NOD OFF OR FALL ASLEEP WHILE SITTING INACTIVE IN A PUBLIC PLACE: WOULD NEVER DOZE

## 2025-01-17 NOTE — PATIENT INSTRUCTIONS
school etc. behind you when you go to bed.    Ensure your bedroom is quiet and comfortable. A cool, dark room is recommended. A white noise machine can help eliminate awakenings due to sounds.           CPAP Equipment Cleaning and Disinfecting Schedule  Equipment Cleaning Frequency Instructions  Disinfecting Frequency   Non-Disposable Filters  Weekly Mild soapy water, Rinse, Air Dry Not Required   Disposable Filters Change as needed  2-4 weeks Do Not Wash Not Required   Hose/tubing Daily Mild soapy water, Rinse, Air Dry Once a week   Mask / Nasal Pillows Daily Mild soapy water, Rinse, Air Dry Once a week   Headgear Weekly Hand wash, Mild soapy water, Rinse, Dry  Not Required   Humidifier Daily Empty water daily  Mild soapy water, Rinse well, Air Dry  Once a week   CPAP Unit As Needed Dust with damp cloth,  No detergents or sprays Not Required         Disinfect (per schedule) with 1 part white vinegar and 3 parts water- soak mask and water chamber for 30 minutes every 1-2 weeks, more often if sick.  Allow water/vinegar mixture to run through tubing.  Allow all equipment to air dry.   Drying Hints:   Always hang tubing away from direct sunlight, as this will cause the tubing to become yellow, brittle and crack over a period of time. DO NOT attach the wet tubing to your CPAP unit to blow-dry it. The moisture from the tubing can drain back into your machine. Moisture in your unit can cause sudden pressure increases or short circuits  DO's and DON'Ts:  - Don't use alcohol-based products to clean your mask, because it can cause the materials to become hard and brittle.   - Don't put headgear in the washer or dryer  - Don't use any caustic or household cleaning solutions such as bleach on your CPAP   equipment.  - Do follow the recommended cleaning schedule.    - Do change your disposable filter frequently.     Adapted From: http://www.cpapstation.com/cleaning.shtm.  These are general suggestions for all models please

## 2025-01-17 NOTE — PROGRESS NOTES
MA Communication:  The following orders are received by verbal communication from Ashleigh Murcia PA-C.     Orders include:    Supplies   1 year f/u     
associated daytime sleepiness or observed apneas.  No choking or gasping during sleep. Occasional morning headaches.  No treatments tried so far.  Has not been evaluated for sleep apnea in the past.  Works 3rd shift & has very irregular sleep schedule. Usually sleeps ~6-8 hrs daily.  [place] is 5.  No car wrecks or near wrecks because of sleepiness.  No nodding off while driving.  Has cut back on caffeine usage.  No history of afib but large PVC burden even after starting metoprolol.  Former smoker, 1ppd x15 yrs, quit 6  ya.  No known family history of SINAI.    Son has Down's syndrome & tetralogy of fallot    PHYSICAL EXAM:  Blood pressure 108/70, pulse 88, resp. rate 20, height 1.702 m (5' 7\"), weight 98.9 kg (218 lb), SpO2 95%.'   218# Aug 2022; 212# Jan 2023; 223# Jan 2024; 218# Jan 2025;   Constitutional:  No acute distress.   HENT:  Oropharynx is clear and moist. Mallampati II  Eyes: Pupils equal, round, and reactive to light. No scleral icterus.   Neck: No tracheal deviation present.  Circumference is 17 inches   Cardiovascular: Normal heart sounds.  No lower extremity edema.  Pulmonary/Chest: No wheezes. No rhonchi. No rales. No decreased breath sounds.  No accessory muscle usage or stridor.   Musculoskeletal: No cyanosis. No clubbing.  Skin: Skin is warm and dry.   Psychiatric: Normal mood and affect.    DATA:  Review of EP records    Holter monitor 6/7/2022: NSR with 24.7% PVCs & ventricular couplet burden    Holter monitor ~10/2023:  no sustained arrhythmias, some brief atrial runs and low burden of PVCs: 0.09%.     Echo 8/3/22: EF 55%.  Normal LV diastolic filling pressure.  RV normal in size and function.  No significant valvular disease noted.  STEMI 8 SPAP.  Frequent PVCs noted throughout the study.    HST 10/17/2022: AHI 9.7, SPO2 francisco 86%.    Titration 11/16/2022: Recommend CPAP 11.  Treatment emergent CSA.  EST to 26, SC 80%.  SL 1.8, REM SL 63.  No significant PLMS.  EKG NSR with multiple

## 2025-01-20 ENCOUNTER — TELEPHONE (OUTPATIENT)
Dept: PULMONOLOGY | Age: 45
End: 2025-01-20

## 2025-02-08 DIAGNOSIS — E78.2 MIXED HYPERLIPIDEMIA: ICD-10-CM

## 2025-02-09 DIAGNOSIS — E11.9 TYPE 2 DIABETES MELLITUS WITHOUT COMPLICATION, WITHOUT LONG-TERM CURRENT USE OF INSULIN (HCC): ICD-10-CM

## 2025-02-10 RX ORDER — ATORVASTATIN CALCIUM 40 MG/1
40 TABLET, FILM COATED ORAL DAILY
Qty: 90 TABLET | Refills: 1 | Status: SHIPPED | OUTPATIENT
Start: 2025-02-10

## 2025-02-10 RX ORDER — GLIPIZIDE 10 MG/1
10 TABLET, FILM COATED, EXTENDED RELEASE ORAL DAILY
Qty: 30 TABLET | Refills: 0 | Status: SHIPPED | OUTPATIENT
Start: 2025-02-10

## 2025-02-12 ENCOUNTER — TELEPHONE (OUTPATIENT)
Dept: FAMILY MEDICINE CLINIC | Age: 45
End: 2025-02-12

## 2025-02-12 NOTE — TELEPHONE ENCOUNTER
Please submit a pa for   Continuous Glucose Sensor (FREESTYLE RISA 2 SENSOR) [facility]   DX E11.9

## 2025-02-14 ENCOUNTER — TELEPHONE (OUTPATIENT)
Dept: FAMILY MEDICINE CLINIC | Age: 45
End: 2025-02-14

## 2025-02-14 NOTE — TELEPHONE ENCOUNTER
That's right, I forgot about that.    Ok, then we can proceed with the PA.   Please route to the PA team.     Thanks!

## 2025-02-14 NOTE — TELEPHONE ENCOUNTER
Processing PA for FreeStyle Janel 2 Sensor and message from plan - The patient's drug benefit plan provides coverage for other products which may be considered for treating your patient. Can your patient be treated with a formulary product? Available Formulary Alternative: Dexcom Continuous Glucose Monitor [NOTE: If yes, provide your patient with a new prescription for the formulary product.     Did you want to proceed with PA?

## 2025-02-14 NOTE — TELEPHONE ENCOUNTER
Submitted PA for FreeStyle Janel 2 Sensor    Via CM"[employer] Key: [id number] STATUS: PENDING.    Follow up done daily; if no decision with in three days we will refax.  If another three days goes by with no decision will call the insurance for status.

## 2025-02-14 NOTE — TELEPHONE ENCOUNTER
Called and spoke with pt, pt stated he has tried the Dexcom in the past and he had an allergic reaction to them. His arm broke out in a rash. Please advise.

## 2025-02-26 ENCOUNTER — TELEPHONE (OUTPATIENT)
Dept: FAMILY MEDICINE CLINIC | Age: 45
End: 2025-02-26

## 2025-02-26 NOTE — TELEPHONE ENCOUNTER
Patient is calling in on the PA for his Contiguous Glucose Sensor the Freestyle Janel 2 Sensor.      Please advise status

## 2025-02-27 DIAGNOSIS — E11.65 TYPE 2 DIABETES MELLITUS WITH HYPERGLYCEMIA, WITHOUT LONG-TERM CURRENT USE OF INSULIN (HCC): ICD-10-CM

## 2025-02-27 NOTE — TELEPHONE ENCOUNTER
Future Appointments   Date Time Provider Department Center   3/4/2025 10:20 AM Kathie Berumen APRN - CNP [facility] E[facility] DEP   1/16/2026  1:00 PM Ashleigh Murcia PA-C [facility] SLEEP [facility] Visit date not found    Pt seen 8/8/2024 at [place]

## 2025-02-28 NOTE — TELEPHONE ENCOUNTER
I called [facility] to check the status of this. I spoke to Shelly and she states that they need chart documentation of the patient having his allergic reaction to the Dexcom. I sent them the note that was sent to me in the chart hoping that will be all they need. Just waiting on an answer.

## 2025-03-03 ASSESSMENT — PATIENT HEALTH QUESTIONNAIRE - PHQ9
SUM OF ALL RESPONSES TO PHQ QUESTIONS 1-9: 0
SUM OF ALL RESPONSES TO PHQ9 QUESTIONS 1 & 2: 0
2. FEELING DOWN, DEPRESSED OR HOPELESS: NOT AT ALL
SUM OF ALL RESPONSES TO PHQ QUESTIONS 1-9: 0
1. LITTLE INTEREST OR PLEASURE IN DOING THINGS: NOT AT ALL
2. FEELING DOWN, DEPRESSED OR HOPELESS: NOT AT ALL
SUM OF ALL RESPONSES TO PHQ QUESTIONS 1-9: 0
1. LITTLE INTEREST OR PLEASURE IN DOING THINGS: NOT AT ALL
SUM OF ALL RESPONSES TO PHQ QUESTIONS 1-9: 0

## 2025-03-04 ENCOUNTER — OFFICE VISIT (OUTPATIENT)
Dept: FAMILY MEDICINE CLINIC | Age: 45
End: 2025-03-04
Payer: COMMERCIAL

## 2025-03-04 VITALS
OXYGEN SATURATION: 96 % | SYSTOLIC BLOOD PRESSURE: 134 MMHG | TEMPERATURE: 98.9 F | BODY MASS INDEX: 34.53 KG/M2 | DIASTOLIC BLOOD PRESSURE: 82 MMHG | HEART RATE: 89 BPM | RESPIRATION RATE: 16 BRPM | WEIGHT: 220 LBS | HEIGHT: 67 IN

## 2025-03-04 DIAGNOSIS — E11.65 TYPE 2 DIABETES MELLITUS WITH HYPERGLYCEMIA, WITHOUT LONG-TERM CURRENT USE OF INSULIN (HCC): ICD-10-CM

## 2025-03-04 DIAGNOSIS — Z00.00 ENCOUNTER FOR WELL ADULT EXAM WITHOUT ABNORMAL FINDINGS: Primary | ICD-10-CM

## 2025-03-04 DIAGNOSIS — E78.2 MIXED HYPERLIPIDEMIA: Chronic | ICD-10-CM

## 2025-03-04 DIAGNOSIS — Z12.11 SCREENING FOR MALIGNANT NEOPLASM OF COLON: ICD-10-CM

## 2025-03-04 DIAGNOSIS — G47.33 OSA (OBSTRUCTIVE SLEEP APNEA): Chronic | ICD-10-CM

## 2025-03-04 DIAGNOSIS — Z23 NEED FOR PNEUMOCOCCAL 20-VALENT CONJUGATE VACCINATION: ICD-10-CM

## 2025-03-04 DIAGNOSIS — I47.29 NSVT (NONSUSTAINED VENTRICULAR TACHYCARDIA) (HCC): ICD-10-CM

## 2025-03-04 DIAGNOSIS — K76.0 HEPATIC STEATOSIS: Chronic | ICD-10-CM

## 2025-03-04 PROBLEM — I49.9 CARDIAC ARRHYTHMIA: Status: RESOLVED | Noted: 2022-10-18 | Resolved: 2025-03-04

## 2025-03-04 LAB — HBA1C MFR BLD: 8.3 %

## 2025-03-04 PROCEDURE — 90677 PCV20 VACCINE IM: CPT

## 2025-03-04 PROCEDURE — 99396 PREV VISIT EST AGE 40-64: CPT

## 2025-03-04 PROCEDURE — 90471 IMMUNIZATION ADMIN: CPT

## 2025-03-04 PROCEDURE — 83036 HEMOGLOBIN GLYCOSYLATED A1C: CPT

## 2025-03-04 RX ORDER — GLIPIZIDE 10 MG/1
10 TABLET, FILM COATED, EXTENDED RELEASE ORAL DAILY
Qty: 90 TABLET | Refills: 1 | Status: SHIPPED | OUTPATIENT
Start: 2025-03-04

## 2025-03-04 SDOH — ECONOMIC STABILITY: FOOD INSECURITY: WITHIN THE PAST 12 MONTHS, YOU WORRIED THAT YOUR FOOD WOULD RUN OUT BEFORE YOU GOT MONEY TO BUY MORE.: NEVER TRUE

## 2025-03-04 SDOH — ECONOMIC STABILITY: FOOD INSECURITY: WITHIN THE PAST 12 MONTHS, THE FOOD YOU BOUGHT JUST DIDN'T LAST AND YOU DIDN'T HAVE MONEY TO GET MORE.: NEVER TRUE

## 2025-03-04 ASSESSMENT — ENCOUNTER SYMPTOMS
SHORTNESS OF BREATH: 0
COUGH: 0
VOMITING: 0
ABDOMINAL PAIN: 0
NAUSEA: 0

## 2025-03-04 NOTE — TELEPHONE ENCOUNTER
Called patient and advised that PA was resubmmited. Advised of timeframe. Would have update in the next 24-72 hours. 1-3 days

## 2025-03-04 NOTE — TELEPHONE ENCOUNTER
I called [facility] and spoke to Harshad Ref# [id number] and he states they never received the fax. He is sending over a new PA that he states has to be resubmitted along with chart notes documenting allergic reaction to the dexcom.     Please notify patient. Thank you.

## 2025-03-04 NOTE — PROGRESS NOTES
Plan  Current Health Maintenance Status  Immunization History   Administered Date(s) Administered    Influenza A ([id number]) Vaccine PF IM 10/20/2009    Influenza Virus Vaccine 10/04/2014    Influenza, FLUARIX, FLULAVAL, FLUZONE (age 6 mo+) and AFLURIA, (age 3 y+), Quadv PF, 0.5mL 10/04/2014, 09/28/2018, 11/02/2019, 11/15/2020    Influenza, FLUCELVAX, (age 6 mo+), MDCK, Quadv PF, 0.5mL 12/07/2022, 10/24/2023    Pneumococcal, PCV20, PREVNAR 20, (age 6w+), IM, 0.5mL 03/04/2025    Pneumococcal, PPSV23, PNEUMOVAX 23, (age 2y+), SC/IM, 0.5mL 09/22/2021    TDaP, ADACEL (age 10y-64y), BOOSTRIX (age 10y+), IM, 0.5mL 04/21/2015        Health Maintenance Due   Topic Date Due    Hepatitis B vaccine (1 of 3 - 19+ 3-dose series) Never done    COVID-19 Vaccine (1 - 2024-25 season) Never done    Diabetic Alb to Cr ratio (uACR) test  01/18/2025    Lipids  01/18/2025    GFR test (Diabetes, CKD 3-4, OR last GFR 15-59)  01/18/2025    Colorectal Cancer Screen  Never done      Recommendations for Preventive Services Due: see orders and patient instructions/AVS.

## 2025-03-04 NOTE — TELEPHONE ENCOUNTER
Pt has an appt in office today and is requesting an update. Initial PA was submitted 2/12/2025. Please advise if there are any updates or anything we can do to expedite this. Pt had to pay $75 for this.

## 2025-03-13 DIAGNOSIS — E78.2 MIXED HYPERLIPIDEMIA: Chronic | ICD-10-CM

## 2025-03-13 DIAGNOSIS — K76.0 HEPATIC STEATOSIS: Chronic | ICD-10-CM

## 2025-03-13 DIAGNOSIS — E11.65 TYPE 2 DIABETES MELLITUS WITH HYPERGLYCEMIA, WITHOUT LONG-TERM CURRENT USE OF INSULIN (HCC): ICD-10-CM

## 2025-03-13 DIAGNOSIS — G47.33 OSA (OBSTRUCTIVE SLEEP APNEA): Chronic | ICD-10-CM

## 2025-03-13 LAB
ALBUMIN SERPL-MCNC: 4.8 G/DL (ref 3.4–5)
ALBUMIN/GLOB SERPL: 2.7 {RATIO} (ref 1.1–2.2)
ALP SERPL-CCNC: 90 U/L (ref 40–129)
ALT SERPL-CCNC: 56 U/L (ref 10–40)
ANION GAP SERPL CALCULATED.3IONS-SCNC: 13 MMOL/L (ref 3–16)
AST SERPL-CCNC: 24 U/L (ref 15–37)
BASOPHILS # BLD: 0.1 K/UL (ref 0–0.2)
BASOPHILS NFR BLD: 0.6 %
BILIRUB SERPL-MCNC: 0.6 MG/DL (ref 0–1)
BUN SERPL-MCNC: 17 MG/DL (ref 7–20)
CALCIUM SERPL-MCNC: 9.6 MG/DL (ref 8.3–10.6)
CHLORIDE SERPL-SCNC: 104 MMOL/L (ref 99–110)
CHOLEST SERPL-MCNC: 104 MG/DL (ref 0–199)
CO2 SERPL-SCNC: 23 MMOL/L (ref 21–32)
CREAT SERPL-MCNC: 0.9 MG/DL (ref 0.9–1.3)
DEPRECATED RDW RBC AUTO: 13.6 % (ref 12.4–15.4)
EOSINOPHIL # BLD: 0.2 K/UL (ref 0–0.6)
EOSINOPHIL NFR BLD: 1.9 %
GFR SERPLBLD CREATININE-BSD FMLA CKD-EPI: >90 ML/MIN/{1.73_M2}
GLUCOSE SERPL-MCNC: 180 MG/DL (ref 70–99)
HCT VFR BLD AUTO: 43.9 % (ref 40.5–52.5)
HDLC SERPL-MCNC: 31 MG/DL (ref 40–60)
HGB BLD-MCNC: 14.9 G/DL (ref 13.5–17.5)
LDLC SERPL CALC-MCNC: 61 MG/DL
LYMPHOCYTES # BLD: 3.1 K/UL (ref 1–5.1)
LYMPHOCYTES NFR BLD: 32.3 %
MCH RBC QN AUTO: 28.5 PG (ref 26–34)
MCHC RBC AUTO-ENTMCNC: 34 G/DL (ref 31–36)
MCV RBC AUTO: 83.9 FL (ref 80–100)
MONOCYTES # BLD: 0.6 K/UL (ref 0–1.3)
MONOCYTES NFR BLD: 6.6 %
NEUTROPHILS # BLD: 5.7 K/UL (ref 1.7–7.7)
NEUTROPHILS NFR BLD: 58.6 %
PLATELET # BLD AUTO: 194 K/UL (ref 135–450)
PMV BLD AUTO: 9.1 FL (ref 5–10.5)
POTASSIUM SERPL-SCNC: 4.6 MMOL/L (ref 3.5–5.1)
PROT SERPL-MCNC: 6.6 G/DL (ref 6.4–8.2)
RBC # BLD AUTO: 5.23 M/UL (ref 4.2–5.9)
SODIUM SERPL-SCNC: 140 MMOL/L (ref 136–145)
TRIGL SERPL-MCNC: 59 MG/DL (ref 0–150)
TSH SERPL DL<=0.005 MIU/L-ACNC: 1.69 UIU/ML (ref 0.27–4.2)
VLDLC SERPL CALC-MCNC: 12 MG/DL
WBC # BLD AUTO: 9.7 K/UL (ref 4–11)

## 2025-03-14 ENCOUNTER — RESULTS FOLLOW-UP (OUTPATIENT)
Dept: FAMILY MEDICINE CLINIC | Age: 45
End: 2025-03-14

## 2025-03-14 DIAGNOSIS — E11.65 TYPE 2 DIABETES MELLITUS WITH HYPERGLYCEMIA, WITHOUT LONG-TERM CURRENT USE OF INSULIN (HCC): Primary | ICD-10-CM

## 2025-03-19 LAB — NONINV COLON CA DNA+OCC BLD SCRN STL QL: NEGATIVE

## 2025-03-23 DIAGNOSIS — E11.9 TYPE 2 DIABETES MELLITUS WITHOUT COMPLICATION, WITHOUT LONG-TERM CURRENT USE OF INSULIN: ICD-10-CM

## 2025-03-25 RX ORDER — METFORMIN HYDROCHLORIDE 500 MG/1
1000 TABLET, EXTENDED RELEASE ORAL 2 TIMES DAILY
Qty: 360 TABLET | Refills: 0 | Status: SHIPPED | OUTPATIENT
Start: 2025-03-25

## 2025-06-10 ENCOUNTER — OFFICE VISIT (OUTPATIENT)
Dept: FAMILY MEDICINE CLINIC | Age: 45
End: 2025-06-10

## 2025-06-10 VITALS
RESPIRATION RATE: 16 BRPM | SYSTOLIC BLOOD PRESSURE: 126 MMHG | BODY MASS INDEX: 34.18 KG/M2 | WEIGHT: 215 LBS | HEART RATE: 92 BPM | TEMPERATURE: 97.1 F | OXYGEN SATURATION: 96 % | DIASTOLIC BLOOD PRESSURE: 84 MMHG

## 2025-06-10 DIAGNOSIS — E78.2 MIXED HYPERLIPIDEMIA: Chronic | ICD-10-CM

## 2025-06-10 DIAGNOSIS — E78.2 MIXED HYPERLIPIDEMIA: ICD-10-CM

## 2025-06-10 DIAGNOSIS — E11.65 TYPE 2 DIABETES MELLITUS WITH HYPERGLYCEMIA, WITHOUT LONG-TERM CURRENT USE OF INSULIN (HCC): ICD-10-CM

## 2025-06-10 DIAGNOSIS — E11.65 TYPE 2 DIABETES MELLITUS WITH HYPERGLYCEMIA, WITHOUT LONG-TERM CURRENT USE OF INSULIN (HCC): Primary | ICD-10-CM

## 2025-06-10 DIAGNOSIS — G47.33 OSA (OBSTRUCTIVE SLEEP APNEA): ICD-10-CM

## 2025-06-10 LAB — HBA1C MFR BLD: 6.9 %

## 2025-06-10 RX ORDER — HYDROCHLOROTHIAZIDE 12.5 MG/1
CAPSULE ORAL
Qty: 2 EACH | Refills: 5 | Status: SHIPPED | OUTPATIENT
Start: 2025-06-10

## 2025-06-10 ASSESSMENT — PATIENT HEALTH QUESTIONNAIRE - PHQ9
1. LITTLE INTEREST OR PLEASURE IN DOING THINGS: NOT AT ALL
SUM OF ALL RESPONSES TO PHQ QUESTIONS 1-9: 0
2. FEELING DOWN, DEPRESSED OR HOPELESS: NOT AT ALL
SUM OF ALL RESPONSES TO PHQ QUESTIONS 1-9: 0

## 2025-06-10 ASSESSMENT — ENCOUNTER SYMPTOMS
VOMITING: 0
SHORTNESS OF BREATH: 0
COUGH: 0
NAUSEA: 0

## 2025-06-10 NOTE — PROGRESS NOTES
history of tobacco use    Dizziness, nonspecific    S[facility] (obstructive sleep apnea)    Type 2 diabetes mellitus with hyperglycemia    Mixed hyperlipidemia       PE  Vitals:    06/10/25 1438   BP: 126/84   BP Site: Left Upper Arm   Patient Position: Sitting   Pulse: 92   Resp: 16   Temp: 97.1 °F (36.2 °C)   TempSrc: Temporal   SpO2: 96%   Weight: 97.5 kg (215 lb)     Estimated body mass index is 34.18 kg/m² as calculated from the following:    Height as of 3/4/25: 1.689 m (5' 6.5\").    Weight as of this encounter: 97.5 kg (215 lb).    Physical Exam  Vitals reviewed.   Constitutional:       Appearance: Normal appearance.   HENT:      Head: Normocephalic.      Right Ear: External ear normal.      Left Ear: External ear normal.      Nose: Nose normal.      Mouth/Throat:      Mouth: Mucous membranes are moist.   Eyes:      Extraocular Movements: Extraocular movements intact.      Pupils: Pupils are equal, round, and reactive to light.   Cardiovascular:      Rate and Rhythm: Normal rate and regular rhythm.      Pulses: Normal pulses.      Heart sounds: Normal heart sounds.   Pulmonary:      Effort: Pulmonary effort is normal.      Breath sounds: Normal breath sounds.   Abdominal:      General: Abdomen is flat. Bowel sounds are normal.      Palpations: Abdomen is soft.   Musculoskeletal:         General: Normal range of motion.      Cervical back: Normal range of motion and neck supple.      Right lower leg: No edema.      Left lower leg: No edema.   Skin:     General: Skin is warm and dry.      Capillary Refill: Capillary refill takes less than 2 seconds.   Neurological:      General: No focal deficit present.      Mental Status: He is alert and oriented to person, place, and time.   Psychiatric:         Mood and Affect: Mood normal.         Behavior: Behavior normal.         Kathie Berumen, APRN - CNP

## 2025-06-11 LAB
CHOLEST SERPL-MCNC: 134 MG/DL (ref 0–199)
CREAT UR-MCNC: 91.8 MG/DL (ref 39–259)
HDLC SERPL-MCNC: 32 MG/DL (ref 40–60)
LDLC SERPL CALC-MCNC: 69 MG/DL
MICROALBUMIN UR DL<=1MG/L-MCNC: <1.2 MG/DL
MICROALBUMIN/CREAT UR: NORMAL MG/G (ref 0–30)
TRIGL SERPL-MCNC: 167 MG/DL (ref 0–150)
VLDLC SERPL CALC-MCNC: 33 MG/DL

## 2025-06-21 DIAGNOSIS — E11.9 TYPE 2 DIABETES MELLITUS WITHOUT COMPLICATION, WITHOUT LONG-TERM CURRENT USE OF INSULIN (HCC): ICD-10-CM

## 2025-06-23 RX ORDER — LISINOPRIL 5 MG/1
TABLET ORAL
Qty: 45 TABLET | Refills: 1 | Status: SHIPPED | OUTPATIENT
Start: 2025-06-23 | End: 2025-09-21

## 2025-06-23 RX ORDER — METFORMIN HYDROCHLORIDE 500 MG/1
1000 TABLET, EXTENDED RELEASE ORAL 2 TIMES DAILY
Qty: 360 TABLET | Refills: 0 | Status: SHIPPED | OUTPATIENT
Start: 2025-06-23

## 2025-08-06 DIAGNOSIS — E78.2 MIXED HYPERLIPIDEMIA: ICD-10-CM

## 2025-08-08 RX ORDER — ATORVASTATIN CALCIUM 40 MG/1
40 TABLET, FILM COATED ORAL DAILY
Qty: 90 TABLET | Refills: 1 | Status: SHIPPED | OUTPATIENT
Start: 2025-08-08

## 2025-08-23 DIAGNOSIS — I49.3 PVC (PREMATURE VENTRICULAR CONTRACTION): Chronic | ICD-10-CM

## 2025-08-25 RX ORDER — DILTIAZEM HYDROCHLORIDE 180 MG/1
CAPSULE, COATED, EXTENDED RELEASE ORAL DAILY
Qty: 90 CAPSULE | Refills: 0 | Status: SHIPPED | OUTPATIENT
Start: 2025-08-25 | End: 2025-08-27 | Stop reason: SDUPTHER

## 2025-08-26 ASSESSMENT — ENCOUNTER SYMPTOMS
WHEEZING: 0
SNORING: 0
SLEEP DISTURBANCES DUE TO BREATHING: 1
HEMATOCHEZIA: 0
LEFT EYE: 0
RIGHT EYE: 0
STRIDOR: 0
HEMATEMESIS: 0
SHORTNESS OF BREATH: 0

## 2025-08-27 ENCOUNTER — OFFICE VISIT (OUTPATIENT)
Dept: CARDIOLOGY CLINIC | Age: 45
End: 2025-08-27
Payer: COMMERCIAL

## 2025-08-27 VITALS
BODY MASS INDEX: 34.16 KG/M2 | HEART RATE: 92 BPM | HEIGHT: 66 IN | DIASTOLIC BLOOD PRESSURE: 78 MMHG | OXYGEN SATURATION: 95 % | SYSTOLIC BLOOD PRESSURE: 124 MMHG | WEIGHT: 212.52 LBS

## 2025-08-27 DIAGNOSIS — E66.811 OBESITY (BMI 30.0-34.9): ICD-10-CM

## 2025-08-27 DIAGNOSIS — G47.33 OSA (OBSTRUCTIVE SLEEP APNEA): ICD-10-CM

## 2025-08-27 DIAGNOSIS — Z79.899 ENCOUNTER FOR MONITORING CALCIUM CHANNEL BLOCKER THERAPY: ICD-10-CM

## 2025-08-27 DIAGNOSIS — Z51.81 ENCOUNTER FOR MONITORING CALCIUM CHANNEL BLOCKER THERAPY: ICD-10-CM

## 2025-08-27 DIAGNOSIS — I10 PRIMARY HYPERTENSION: ICD-10-CM

## 2025-08-27 DIAGNOSIS — I49.3 PVC (PREMATURE VENTRICULAR CONTRACTION): Primary | Chronic | ICD-10-CM

## 2025-08-27 LAB
EKG ATRIAL RATE: 92 BPM
EKG DIAGNOSIS: NORMAL
EKG P AXIS: 52 DEGREES
EKG P-R INTERVAL: 140 MS
EKG Q-T INTERVAL: 350 MS
EKG QRS DURATION: 80 MS
EKG QTC CALCULATION (BAZETT): 432 MS
EKG R AXIS: 47 DEGREES
EKG T AXIS: 34 DEGREES
EKG VENTRICULAR RATE: 92 BPM

## 2025-08-27 PROCEDURE — 3074F SYST BP LT 130 MM HG: CPT | Performed by: INTERNAL MEDICINE

## 2025-08-27 PROCEDURE — 3078F DIAST BP <80 MM HG: CPT | Performed by: INTERNAL MEDICINE

## 2025-08-27 PROCEDURE — 99214 OFFICE O/P EST MOD 30 MIN: CPT | Performed by: INTERNAL MEDICINE

## 2025-08-27 PROCEDURE — G2211 COMPLEX E/M VISIT ADD ON: HCPCS | Performed by: INTERNAL MEDICINE

## 2025-08-27 RX ORDER — DILTIAZEM HYDROCHLORIDE 180 MG/1
180 CAPSULE, COATED, EXTENDED RELEASE ORAL DAILY
Qty: 90 CAPSULE | Refills: 3 | Status: SHIPPED | OUTPATIENT
Start: 2025-08-27

## 2025-08-31 DIAGNOSIS — E11.65 TYPE 2 DIABETES MELLITUS WITH HYPERGLYCEMIA, WITHOUT LONG-TERM CURRENT USE OF INSULIN (HCC): ICD-10-CM

## 2025-09-02 RX ORDER — EMPAGLIFLOZIN 10 MG/1
10 TABLET, FILM COATED ORAL DAILY
Qty: 90 TABLET | Refills: 1 | Status: SHIPPED | OUTPATIENT
Start: 2025-09-02

## 2025-09-02 RX ORDER — GLIPIZIDE 10 MG/1
10 TABLET, FILM COATED, EXTENDED RELEASE ORAL DAILY
Qty: 90 TABLET | Refills: 1 | Status: SHIPPED | OUTPATIENT
Start: 2025-09-02